# Patient Record
Sex: FEMALE | Race: BLACK OR AFRICAN AMERICAN | Employment: UNEMPLOYED | URBAN - METROPOLITAN AREA
[De-identification: names, ages, dates, MRNs, and addresses within clinical notes are randomized per-mention and may not be internally consistent; named-entity substitution may affect disease eponyms.]

---

## 2017-02-21 ENCOUNTER — OFFICE VISIT (OUTPATIENT)
Dept: FAMILY MEDICINE CLINIC | Age: 52
End: 2017-02-21

## 2017-02-21 VITALS
WEIGHT: 154 LBS | HEIGHT: 68 IN | DIASTOLIC BLOOD PRESSURE: 76 MMHG | SYSTOLIC BLOOD PRESSURE: 114 MMHG | RESPIRATION RATE: 18 BRPM | HEART RATE: 63 BPM | BODY MASS INDEX: 23.34 KG/M2 | TEMPERATURE: 98.5 F | OXYGEN SATURATION: 98 %

## 2017-02-21 DIAGNOSIS — N64.4 BREAST PAIN: Primary | ICD-10-CM

## 2017-02-21 PROCEDURE — 99213 OFFICE O/P EST LOW 20 MIN: CPT | Performed by: NURSE PRACTITIONER

## 2017-02-22 ENCOUNTER — HOSPITAL ENCOUNTER (OUTPATIENT)
Dept: MAMMOGRAPHY | Age: 52
Discharge: HOME | End: 2017-02-22
Attending: NURSE PRACTITIONER | Admitting: NURSE PRACTITIONER

## 2017-02-22 DIAGNOSIS — N64.4 BREAST PAIN: ICD-10-CM

## 2017-02-22 DIAGNOSIS — N63.0 MASS OF BREAST: ICD-10-CM

## 2017-02-22 DIAGNOSIS — N64.4 MASTODYNIA: ICD-10-CM

## 2017-02-27 DIAGNOSIS — D50.9 IRON DEFICIENCY ANEMIA, UNSPECIFIED IRON DEFICIENCY ANEMIA TYPE: ICD-10-CM

## 2017-02-27 RX ORDER — FERROUS SULFATE 325(65) MG
TABLET ORAL
Qty: 60 TABLET | Refills: 0 | Status: SHIPPED | OUTPATIENT
Start: 2017-02-27 | End: 2017-03-31 | Stop reason: SDUPTHER

## 2017-03-31 DIAGNOSIS — D50.9 IRON DEFICIENCY ANEMIA, UNSPECIFIED IRON DEFICIENCY ANEMIA TYPE: ICD-10-CM

## 2017-03-31 RX ORDER — FERROUS SULFATE 325(65) MG
TABLET ORAL
Qty: 60 TABLET | Refills: 5 | Status: SHIPPED | OUTPATIENT
Start: 2017-03-31 | End: 2019-08-23

## 2017-06-14 ENCOUNTER — TELEPHONE (OUTPATIENT)
Dept: SURGERY | Age: 52
End: 2017-06-14

## 2017-06-15 RX ORDER — PREDNISONE 50 MG/1
TABLET ORAL
Qty: 3 TABLET | Refills: 0 | Status: SHIPPED | OUTPATIENT
Start: 2017-06-15 | End: 2017-08-28 | Stop reason: ALTCHOICE

## 2017-06-29 ENCOUNTER — TELEPHONE (OUTPATIENT)
Dept: FAMILY MEDICINE CLINIC | Age: 52
End: 2017-06-29

## 2017-07-28 RX ORDER — ACYCLOVIR 400 MG/1
TABLET ORAL
Qty: 30 TABLET | Refills: 0 | Status: SHIPPED | OUTPATIENT
Start: 2017-07-28 | End: 2017-10-17 | Stop reason: ALTCHOICE

## 2017-08-22 RX ORDER — FENOFIBRATE 160 MG/1
TABLET ORAL
Qty: 90 TABLET | Refills: 1 | Status: SHIPPED | OUTPATIENT
Start: 2017-08-22 | End: 2017-09-01 | Stop reason: ALTCHOICE

## 2017-08-28 ENCOUNTER — OFFICE VISIT (OUTPATIENT)
Dept: FAMILY MEDICINE CLINIC | Age: 52
End: 2017-08-28

## 2017-08-28 VITALS
OXYGEN SATURATION: 99 % | WEIGHT: 151.8 LBS | HEIGHT: 68 IN | BODY MASS INDEX: 23.01 KG/M2 | DIASTOLIC BLOOD PRESSURE: 68 MMHG | SYSTOLIC BLOOD PRESSURE: 108 MMHG | TEMPERATURE: 97.8 F | HEART RATE: 79 BPM | RESPIRATION RATE: 18 BRPM

## 2017-08-28 DIAGNOSIS — J45.20 MILD INTERMITTENT ASTHMA WITHOUT COMPLICATION: ICD-10-CM

## 2017-08-28 DIAGNOSIS — Z20.2 EXPOSURE TO STD: ICD-10-CM

## 2017-08-28 DIAGNOSIS — Z23 NEED FOR PROPHYLACTIC VACCINATION AGAINST STREPTOCOCCUS PNEUMONIAE (PNEUMOCOCCUS): ICD-10-CM

## 2017-08-28 DIAGNOSIS — Z01.419 WELL WOMAN EXAM WITH ROUTINE GYNECOLOGICAL EXAM: Primary | ICD-10-CM

## 2017-08-28 DIAGNOSIS — Z13.1 DIABETES MELLITUS SCREENING: ICD-10-CM

## 2017-08-28 DIAGNOSIS — R23.2 HOT FLASHES: ICD-10-CM

## 2017-08-28 DIAGNOSIS — Z12.11 SCREENING FOR COLON CANCER: ICD-10-CM

## 2017-08-28 DIAGNOSIS — N64.4 BREAST PAIN, LEFT: ICD-10-CM

## 2017-08-28 DIAGNOSIS — Z87.42 HISTORY OF VAGINAL SORES: ICD-10-CM

## 2017-08-28 DIAGNOSIS — Z13.220 LIPID SCREENING: ICD-10-CM

## 2017-08-28 DIAGNOSIS — Z11.4 SCREENING FOR HIV WITHOUT PRESENCE OF RISK FACTORS: ICD-10-CM

## 2017-08-28 LAB
A/G RATIO: 1.6 (ref 1.1–2.2)
ALBUMIN SERPL-MCNC: 5.1 G/DL (ref 3.4–5)
ALP BLD-CCNC: 35 U/L (ref 40–129)
ALT SERPL-CCNC: 10 U/L (ref 10–40)
ANION GAP SERPL CALCULATED.3IONS-SCNC: 17 MMOL/L (ref 3–16)
AST SERPL-CCNC: 17 U/L (ref 15–37)
BILIRUB SERPL-MCNC: <0.2 MG/DL (ref 0–1)
BUN BLDV-MCNC: 16 MG/DL (ref 7–20)
CALCIUM SERPL-MCNC: 10.3 MG/DL (ref 8.3–10.6)
CHLORIDE BLD-SCNC: 102 MMOL/L (ref 99–110)
CHOLESTEROL, TOTAL: 257 MG/DL (ref 0–199)
CO2: 23 MMOL/L (ref 21–32)
CREAT SERPL-MCNC: 0.9 MG/DL (ref 0.6–1.1)
GFR AFRICAN AMERICAN: >60
GFR NON-AFRICAN AMERICAN: >60
GLOBULIN: 3.1 G/DL
GLUCOSE BLD-MCNC: 89 MG/DL (ref 70–99)
HDLC SERPL-MCNC: 50 MG/DL (ref 40–60)
LDL CHOLESTEROL CALCULATED: 178 MG/DL
POTASSIUM SERPL-SCNC: 4.1 MMOL/L (ref 3.5–5.1)
SODIUM BLD-SCNC: 142 MMOL/L (ref 136–145)
TOTAL PROTEIN: 8.2 G/DL (ref 6.4–8.2)
TRIGL SERPL-MCNC: 143 MG/DL (ref 0–150)
VLDLC SERPL CALC-MCNC: 29 MG/DL

## 2017-08-28 PROCEDURE — 90471 IMMUNIZATION ADMIN: CPT | Performed by: NURSE PRACTITIONER

## 2017-08-28 PROCEDURE — 90670 PCV13 VACCINE IM: CPT | Performed by: NURSE PRACTITIONER

## 2017-08-28 PROCEDURE — 99396 PREV VISIT EST AGE 40-64: CPT | Performed by: NURSE PRACTITIONER

## 2017-08-28 PROCEDURE — 36415 COLL VENOUS BLD VENIPUNCTURE: CPT | Performed by: NURSE PRACTITIONER

## 2017-08-28 RX ORDER — VENLAFAXINE HYDROCHLORIDE 37.5 MG/1
37.5 CAPSULE, EXTENDED RELEASE ORAL DAILY
Qty: 30 CAPSULE | Refills: 1 | Status: SHIPPED | OUTPATIENT
Start: 2017-08-28 | End: 2017-10-25 | Stop reason: SDUPTHER

## 2017-08-28 ASSESSMENT — PATIENT HEALTH QUESTIONNAIRE - PHQ9
1. LITTLE INTEREST OR PLEASURE IN DOING THINGS: 0
SUM OF ALL RESPONSES TO PHQ9 QUESTIONS 1 & 2: 0
2. FEELING DOWN, DEPRESSED OR HOPELESS: 0
SUM OF ALL RESPONSES TO PHQ QUESTIONS 1-9: 0

## 2017-08-29 LAB
C TRACH DNA GENITAL QL NAA+PROBE: NEGATIVE
CANDIDA SPECIES, DNA PROBE: NORMAL
GARDNERELLA VAGINALIS, DNA PROBE: NORMAL
HIV-1 AND HIV-2 ANTIBODIES: NORMAL
N. GONORRHOEAE DNA: NEGATIVE
TRICHOMONAS VAGINALIS DNA: NORMAL

## 2017-08-30 LAB
HERPES TYPE 1/2 IGM COMBINED: 0.59 IV
HERPES TYPE I/II IGG COMBINED: >22.4 IV
HPV COMMENT: NORMAL
HPV TYPE 16: NOT DETECTED
HPV TYPE 18: NOT DETECTED
HPVOH (OTHER TYPES): NOT DETECTED
HSV 2 GLYCOPROTEIN G AB IGG: >23.6 IV

## 2017-09-01 DIAGNOSIS — E78.00 HYPERCHOLESTEROLEMIA: Primary | ICD-10-CM

## 2017-09-01 RX ORDER — ATORVASTATIN CALCIUM 10 MG/1
10 TABLET, FILM COATED ORAL DAILY
Qty: 30 TABLET | Refills: 2 | Status: SHIPPED | OUTPATIENT
Start: 2017-09-01 | End: 2017-11-17 | Stop reason: SDUPTHER

## 2017-09-18 ENCOUNTER — HOSPITAL ENCOUNTER (OUTPATIENT)
Dept: MAMMOGRAPHY | Age: 52
Discharge: HOME | End: 2017-09-18
Attending: NURSE PRACTITIONER | Admitting: NURSE PRACTITIONER

## 2017-09-18 DIAGNOSIS — N64.4 BREAST PAIN, LEFT: ICD-10-CM

## 2017-09-29 RX ORDER — VALACYCLOVIR HYDROCHLORIDE 1 G/1
1000 TABLET, FILM COATED ORAL DAILY
Qty: 30 TABLET | Refills: 11 | Status: SHIPPED | OUTPATIENT
Start: 2017-09-29 | End: 2017-10-29

## 2017-09-29 RX ORDER — ACYCLOVIR 400 MG/1
TABLET ORAL
Qty: 30 TABLET | Refills: 0 | OUTPATIENT
Start: 2017-09-29

## 2017-10-17 ENCOUNTER — OFFICE VISIT (OUTPATIENT)
Dept: FAMILY MEDICINE CLINIC | Age: 52
End: 2017-10-17

## 2017-10-17 VITALS
BODY MASS INDEX: 23.89 KG/M2 | HEIGHT: 68 IN | HEART RATE: 62 BPM | WEIGHT: 157.6 LBS | DIASTOLIC BLOOD PRESSURE: 72 MMHG | TEMPERATURE: 98.4 F | RESPIRATION RATE: 16 BRPM | SYSTOLIC BLOOD PRESSURE: 110 MMHG

## 2017-10-17 DIAGNOSIS — B97.89 VIRAL SINUSITIS: Primary | ICD-10-CM

## 2017-10-17 DIAGNOSIS — Z23 NEEDS FLU SHOT: ICD-10-CM

## 2017-10-17 DIAGNOSIS — K42.9 UMBILICAL HERNIA WITHOUT OBSTRUCTION AND WITHOUT GANGRENE: ICD-10-CM

## 2017-10-17 DIAGNOSIS — J32.9 VIRAL SINUSITIS: Primary | ICD-10-CM

## 2017-10-17 PROCEDURE — 90630 INFLUENZA, QUADV, 18-64 YRS, ID, PF, MICRO INJ, 0.1ML (FLUZONE QUADV, PF): CPT | Performed by: REGISTERED NURSE

## 2017-10-17 PROCEDURE — 90471 IMMUNIZATION ADMIN: CPT | Performed by: REGISTERED NURSE

## 2017-10-17 PROCEDURE — 99213 OFFICE O/P EST LOW 20 MIN: CPT | Performed by: REGISTERED NURSE

## 2017-10-17 RX ORDER — METHYLPREDNISOLONE 4 MG/1
4 TABLET ORAL SEE ADMIN INSTRUCTIONS
Qty: 1 KIT | Refills: 0 | Status: SHIPPED | OUTPATIENT
Start: 2017-10-17 | End: 2017-10-23

## 2017-10-17 ASSESSMENT — ENCOUNTER SYMPTOMS
CHEST TIGHTNESS: 0
TROUBLE SWALLOWING: 0
BLOOD IN STOOL: 0
SORE THROAT: 0
SINUS PAIN: 1
SHORTNESS OF BREATH: 0
NAUSEA: 0
DIARRHEA: 0
SINUS PRESSURE: 1
CONSTIPATION: 0
VOMITING: 0
RHINORRHEA: 1
WHEEZING: 0
ABDOMINAL PAIN: 1
COUGH: 0
ABDOMINAL DISTENTION: 0

## 2017-10-17 NOTE — PROGRESS NOTES
[facility] Medicine  Clinic Note    Date: 10/17/2017                                               Subjective/Objective:     Chief Complaint   Patient presents with    Sinus Problem     PT CO HEAD CONGESTION, SINUS PAIN/PRESSURE. DRY COUGH. STARTED THIS AM. NO OTC MEDS TRIED.  Hernia     HERNIA SURGERY LAST NOVEMBER, AROUND JULY SHE STATES IT STARTED POKING BACK OUT AND CAUSING MORE SX THAT KEEP GETTING WORSE. BOTHERS HER DAILY. HAS APT FOR CT SCAN ON 10/28/17. HPI Patient presents with head congestion, sinus pain/pressure and dry cough that started today. States she woke up symptoms. Also having runny nose. Has not attempted to treat symptoms. Denies fever, chills, fatigue, SOB, CP, sore throat, ear pain, or GI symptoms. Patient thinks her umbilical hernia is back. Had surgery last November. States her umbilicus is bulging and causing pain again. This is happening daily. Her surgeon is aware. She has a CT scheduled for 10/28/17 and would like it moved up sooner.          Patient Active Problem List    Diagnosis Date Noted    Periumbilical pain 56/27/2298    Asthma 03/18/2015    Vitamin D deficiency 03/17/2014    Hypertriglyceridemia 51/88/1256    Umbilical hernia without obstruction and without gangrene 03/14/2014    Hyperlipidemia 03/14/2014    Anemia 03/14/2014    Fatigue 03/14/2014       Past Medical History:   Diagnosis Date    Anemia     iron defieciency    Asthma     Asthma     Hemorrhagic cyst of ovary 2007    Left    Hyperlipidemia        Past Surgical History:   Procedure Laterality Date    HERNIA REPAIR      OTHER SURGICAL HISTORY      lump removed from under left arm pit     TUBAL LIGATION      UMBILICAL HERNIA REPAIR  12/06/2016    and umbilectomy       Orders Only on 08/28/2017   Component Date Value Ref Range Status    C. trachomatis DNA 08/29/2017 Negative  Negative Final    N. gonorrhoeae DNA 08/29/2017 Negative  Negative Final    HPV TYPE 16 08/30/2017 Not

## 2017-10-17 NOTE — PATIENT INSTRUCTIONS
Instructions. \"     If you do not have an account, please click on the \"Sign Up Now\" link. Current as of: May 4, 2017  Content Version: 11.3  © 2006-2017 [employer]. Care instructions adapted under license by [facility] ([facility]). If you have questions about a medical condition or this instruction, always ask your healthcare professional. [address] any warranty or liability for your use of this information. Patient Education        Sinusitis: Care Instructions  Your Care Instructions    Sinusitis is an infection of the lining of the sinus cavities in your head. Sinusitis often follows a cold. It causes pain and pressure in your head and face. In most cases, sinusitis gets better on its own in 1 to 2 weeks. But some mild symptoms may last for several weeks. Sometimes antibiotics are needed. Follow-up care is a key part of your treatment and safety. Be sure to make and go to all appointments, and call your doctor if you are having problems. It's also a good idea to know your test results and keep a list of the medicines you take. How can you care for yourself at home? · Take an over-the-counter pain medicine, such as acetaminophen (Tylenol), ibuprofen (Advil, Motrin), or naproxen (Aleve). Read and follow all instructions on the label. · If the doctor prescribed antibiotics, take them as directed. Do not stop taking them just because you feel better. You need to take the full course of antibiotics. · Be careful when taking over-the-counter cold or flu medicines and Tylenol at the same time. Many of these medicines have acetaminophen, which is Tylenol. Read the labels to make sure that you are not taking more than the recommended dose. Too much acetaminophen (Tylenol) can be harmful. · Breathe warm, moist air from a steamy shower, a hot bath, or a sink filled with hot water. Avoid cold, dry air. Using a humidifier in your home may help.  Follow the directions for cleaning the have a new or higher fever. · You have blood in your stools. · You have new belly pain, or your pain gets worse. · You have a new rash. Watch closely for changes in your health, and be sure to contact your doctor if:  · You start to get better and then get worse. · You do not get better as expected. Where can you learn more? Go to https://[employer])pepiceweb.Pinnatta. org and sign in to your AudioSnaps account. Enter W698 in the WatchFrog box to learn more about \"Viral Infections: Care Instructions. \"     If you do not have an account, please click on the \"Sign Up Now\" link. Current as of: March 3, 2017  Content Version: 11.3  © 4181-7520 [employer]. Care instructions adapted under license by [facility] ([facility]). If you have questions about a medical condition or this instruction, always ask your healthcare professional. [address] any warranty or liability for your use of this information.

## 2017-10-23 ENCOUNTER — HOSPITAL ENCOUNTER (OUTPATIENT)
Dept: CT IMAGING | Age: 52
Discharge: HOME | End: 2017-10-23
Attending: SURGERY | Admitting: SURGERY

## 2017-10-23 DIAGNOSIS — K86.2 CYST OF PANCREAS: ICD-10-CM

## 2017-10-23 DIAGNOSIS — K86.2 PANCREATIC CYST: ICD-10-CM

## 2017-10-24 ENCOUNTER — TELEPHONE (OUTPATIENT)
Dept: FAMILY MEDICINE CLINIC | Age: 52
End: 2017-10-24

## 2017-10-24 DIAGNOSIS — Z12.11 COLON CANCER SCREENING: Primary | ICD-10-CM

## 2017-10-24 NOTE — TELEPHONE ENCOUNTER
Patient is calling she would like a order to have done @ [facility] for a colonoscopy.  Please give her a call back and does she need a referral.

## 2017-10-25 ENCOUNTER — OFFICE VISIT (OUTPATIENT)
Dept: SURGERY | Age: 52
End: 2017-10-25

## 2017-10-25 ENCOUNTER — OFFICE VISIT (OUTPATIENT)
Dept: FAMILY MEDICINE CLINIC | Age: 52
End: 2017-10-25

## 2017-10-25 ENCOUNTER — TELEPHONE (OUTPATIENT)
Dept: FAMILY MEDICINE CLINIC | Age: 52
End: 2017-10-25

## 2017-10-25 VITALS
SYSTOLIC BLOOD PRESSURE: 104 MMHG | RESPIRATION RATE: 18 BRPM | HEIGHT: 68 IN | WEIGHT: 158 LBS | BODY MASS INDEX: 23.95 KG/M2 | HEART RATE: 69 BPM | DIASTOLIC BLOOD PRESSURE: 60 MMHG | OXYGEN SATURATION: 99 %

## 2017-10-25 VITALS — SYSTOLIC BLOOD PRESSURE: 112 MMHG | WEIGHT: 157 LBS | DIASTOLIC BLOOD PRESSURE: 62 MMHG | BODY MASS INDEX: 23.87 KG/M2

## 2017-10-25 DIAGNOSIS — K43.9 EPIGASTRIC HERNIA: Primary | ICD-10-CM

## 2017-10-25 DIAGNOSIS — M54.41 ACUTE RIGHT-SIDED LOW BACK PAIN WITH RIGHT-SIDED SCIATICA: Primary | ICD-10-CM

## 2017-10-25 DIAGNOSIS — R23.2 HOT FLASHES: ICD-10-CM

## 2017-10-25 PROCEDURE — 99213 OFFICE O/P EST LOW 20 MIN: CPT | Performed by: SURGERY

## 2017-10-25 PROCEDURE — 99213 OFFICE O/P EST LOW 20 MIN: CPT | Performed by: NURSE PRACTITIONER

## 2017-10-25 RX ORDER — CYCLOBENZAPRINE HCL 5 MG
5 TABLET ORAL 3 TIMES DAILY PRN
Qty: 30 TABLET | Refills: 0 | Status: SHIPPED | OUTPATIENT
Start: 2017-10-25 | End: 2017-11-04

## 2017-10-25 RX ORDER — PREDNISONE 10 MG/1
TABLET ORAL
Qty: 20 TABLET | Refills: 0 | Status: SHIPPED | OUTPATIENT
Start: 2017-10-25 | End: 2017-11-15 | Stop reason: ALTCHOICE

## 2017-10-25 NOTE — PROGRESS NOTES
Subjective:      Patient ID: Ellie Gilmore is a 50 y.o. female. Chief complaint supraumbilical abdominal pain    HPI- 74-year-old female who presents with complaints of supraumbilical abdominal pain. She occasionally has associated nausea. Review of Systems    Objective:   Physical Exam   Constitutional: She is oriented to person, place, and time. She appears well-developed and well-nourished. HENT:   Head: Normocephalic and atraumatic. Right Ear: External ear normal.   Left Ear: External ear normal.   Eyes: Conjunctivae and EOM are normal.   Neck: Normal range of motion. Neck supple. Cardiovascular: Normal rate and regular rhythm. Pulmonary/Chest: Effort normal and breath sounds normal.   Abdominal: Soft. Epigastric hernia containing fat   Musculoskeletal: Normal range of motion. She exhibits no edema. Neurological: She is alert and oriented to person, place, and time. Skin: Skin is warm and dry. Psychiatric: She has a normal mood and affect. Her behavior is normal.       Assessment:      74-year-old female who is here for follow-up of supraumbilical abdominal pain and for cystic lesion which was seen in the pancreas on a previous CT. Repeat CAT scan shows that the cyst is no longer present. There is no further need for follow-up of the pancreas at this point in time. She does have a symptomatic epigastric hernia located just above the level of the umbilicus. Plan:      Epigastric hernia repair with possible mesh. Patient explained risks, benefits and complications of hernia repair including hernia recurrence, infection requiring mesh removal, bowel injury or erosion of mesh into bowel and nerve entrapment.

## 2017-10-25 NOTE — COMMUNICATION BODY
Assessment:     57-year-old female who is here for follow-up of supraumbilical abdominal pain and for cystic lesion which was seen in the pancreas on a previous CT. Repeat CAT scan shows that the cyst is no longer present. There is no further need for follow-up of the pancreas at this point in time. She does have a symptomatic epigastric hernia located just above the level of the umbilicus. Plan:     Epigastric hernia repair with possible mesh. Patient explained risks, benefits and complications of hernia repair including hernia recurrence, infection requiring mesh removal, bowel injury or erosion of mesh into bowel and nerve entrapment.

## 2017-10-25 NOTE — TELEPHONE ENCOUNTER
Patient had CT scan of her pelvic & abdomin, she is now having right muscle leg weakness - started on yesterday early afternoon. Does she need to come in or what does she need to do.

## 2017-10-25 NOTE — LETTER
Surgery Scheduling Form  PATIENT DEMOGRAPHICS:  Patient Name:  Tong Cano  Patient :  1965   Patient SS#:      Patient Phone:  [phone] (home)  Alt. Patient Phone:    Patient Address:  [address] [place] [address]  PCP:  Nickolas Mercado CNP   Insurance:  Payor: Kenneth Mooney 150 / Plan: BCBS - [place] PPO / Product Type: *No Product type* /   Insurance ID Number:    Payor/Plan Subscr  Sex Relation Sub. Ins. ID Effective Group Num   1.  [id number] Simone Disla 1966 Male  [id number] 1/1/15 [id number]                                    [id number]     Allergies: Contrast [gadolinium derivatives]  DIAGNOSIS & PROCEDURE:  Diagnosis:   Epigastric hernia  Operation:  Open epigastric hernia repair with possible mesh   Location:  [facility]  Surgeon:  [facility] INFORMATION:   Surgeon's Scheduling Instruction:  elective  Requested Date: 10/30/2017   OR Time: 900  Patient Arrival Time: 730  OR Time Required:  60  Minutes  Anesthesia:  General  Equipment:  none   Status:  Outpatient  PAT Required: Per Anesthesia  Pre-op H & P:  By Surgeon  Special Comments:  Date:  10/25/17  Time: 215 pm  Confirmation Number________________________  PRE-CERTIFICATION INFORMATION:  ICD 10 Code: K43.9        CPT Code: 22500  Modifier

## 2017-10-25 NOTE — PROGRESS NOTES
Subjective:      Patient ID: Maggie Adames is a 50 y.o. female. HPI     Right leg pain x one day no injury -aching pain worse with activity - numbness from right hip to below knee and tingling in groin- not tender to touch- feels weak favoring left side had a hard time sleeping - she has not tried anything    Review of Systems   Musculoskeletal:        Right leg pain   All other systems reviewed and are negative. Objective:   Physical Exam   Constitutional: Vital signs are normal. She appears well-developed and well-nourished. She is active. Musculoskeletal:        Lumbar back: She exhibits normal range of motion and no tenderness. Positive SLR test noted to right leg with extension  Slight weakness noted in right leg 4/5  No DROM or pain noted with internal - external hip rotation   Neurological:   Reflex Scores:       Patellar reflexes are 2+ on the right side and 2+ on the left side. Psychiatric: She has a normal mood and affect. Her speech is normal and behavior is normal. Judgment and thought content normal. Cognition and memory are normal.       Assessment:      1. Acute right-sided low back pain with right-sided sciatica           Plan:      Laura Coles was seen today for leg pain. Diagnoses and all orders for this visit:    Acute right-sided low back pain with right-sided sciatica  -     predniSONE (DELTASONE) 10 MG tablet; 4 tabs x 2 d 3 tabs x 2 d 2 tabs x 2 d 1  tab x 2 d in am with food avoid NSAIDs while on this medication  -     cyclobenzaprine (FLEXERIL) 5 MG tablet;  Take 1 tablet by mouth 3 times daily as needed for Muscle spasms- sedation precautions dw pt  Pt encouraged to do stretches at least 4 x daily  Cold compresses at least four x daily on 15 min off 15 min  I informed pt that this may take several weeks to resolve

## 2017-10-25 NOTE — PATIENT INSTRUCTIONS
Patient Education        Sciatica: Exercises  Your Care Instructions  Here are some examples of typical rehabilitation exercises for your condition. Start each exercise slowly. Ease off the exercise if you start to have pain. Your doctor or physical therapist will tell you when you can start these exercises and which ones will work best for you. When you are not being active, find a comfortable position for rest. Some people are comfortable on the floor or a medium-firm bed with a small pillow under their head and another under their knees. Some people prefer to lie on their side with a pillow between their knees. Don't stay in one position for too long. Take short walks (10 to 20 minutes) every 2 to 3 hours. Avoid slopes, hills, and stairs until you feel better. Walk only distances you can manage without pain, especially leg pain. How to do the exercises  Back stretches    1. Get down on your hands and knees on the floor. 2. Relax your head and allow it to droop. Round your back up toward the ceiling until you feel a nice stretch in your upper, middle, and lower back. Hold this stretch for as long as it feels comfortable, or about 15 to 30 seconds. 3. Return to the starting position with a flat back while you are on your hands and knees. 4. Let your back sway by pressing your stomach toward the floor. Lift your buttocks toward the ceiling. 5. Hold this position for 15 to 30 seconds. 6. Repeat 2 to 4 times. Follow-up care is a key part of your treatment and safety. Be sure to make and go to all appointments, and call your doctor if you are having problems. It's also a good idea to know your test results and keep a list of the medicines you take. Where can you learn more? Go to https://Nema Labsrain.TrueFacet. org and sign in to your CloudPrime account. Enter U585 in the [employer] box to learn more about \"Sciatica: Exercises. \"     If you do not have an account, please click on the \"Sign Up Now\" link. Current as of: March 21, 2017  Content Version: 11.3  © 9362-0652 [employer]. Care instructions adapted under license by [facility] ([facility]). If you have questions about a medical condition or this instruction, always ask your healthcare professional. [address] any warranty or liability for your use of this information. Patient Education        Sciatica: Care Instructions  Your Care Instructions    Sciatica (say \"gqs-PF-vf-kuh\") is an irritation of one of the sciatic nerves, which come from the spinal cord in the lower back. The sciatic nerves and their branches extend down through the buttock to the foot. Sciatica can develop when an injured disc in the back presses against a spinal nerve root. Its main symptom is pain, numbness, or weakness that is often worse in the leg or foot than in the back. Sciatica often will improve and go away with time. Early treatment usually includes medicines and exercises to relieve pain. Follow-up care is a key part of your treatment and safety. Be sure to make and go to all appointments, and call your doctor if you are having problems. It's also a good idea to know your test results and keep a list of the medicines you take. How can you care for yourself at home? · Take pain medicines exactly as directed. ¨ If the doctor gave you a prescription medicine for pain, take it as prescribed. ¨ If you are not taking a prescription pain medicine, ask your doctor if you can take an over-the-counter medicine. · Use heat or ice to relieve pain. ¨ To apply heat, put a warm water bottle, heating pad set on low, or warm cloth on your back. Do not go to sleep with a heating pad on your skin. ¨ To use ice, put ice or a cold pack on the area for 10 to 20 minutes at a time. Put a thin cloth between the ice and your skin. · Avoid sitting if possible, unless it feels better than standing. · Alternate lying down with short walks.  Increase your walking distance as you are able to without making your symptoms worse. · Do not do anything that makes your symptoms worse. When should you call for help? Call 911 anytime you think you may need emergency care. For example, call if:  · You are unable to move a leg at all. Call your doctor now or seek immediate medical care if:  · You have new or worse symptoms in your legs or buttocks. Symptoms may include:  ¨ Numbness or tingling. ¨ Weakness. ¨ Pain. · You lose bladder or bowel control. Watch closely for changes in your health, and be sure to contact your doctor if:  · You are not getting better as expected. Where can you learn more? Go to https://Vetiary.[employer] Dairy Fab. org and sign in to your [facility] account. Enter [phone] in the NextGxDX box to learn more about \"Sciatica: Care Instructions. \"     If you do not have an account, please click on the \"Sign Up Now\" link. Current as of: March 21, 2017  Content Version: 11.3  © 0215-9374 [employer]. Care instructions adapted under license by [facility] [address] If you have questions about a medical condition or this instruction, always ask your healthcare professional. [place] [address] any warranty or liability for your use of this information.

## 2017-10-27 RX ORDER — VENLAFAXINE HYDROCHLORIDE 37.5 MG/1
37.5 CAPSULE, EXTENDED RELEASE ORAL DAILY
Qty: 30 CAPSULE | Refills: 0 | Status: SHIPPED | OUTPATIENT
Start: 2017-10-27 | End: 2018-09-10 | Stop reason: ALTCHOICE

## 2017-10-30 ENCOUNTER — HOSPITAL ENCOUNTER (OUTPATIENT)
Dept: SURGERY | Age: 52
Discharge: HOME | End: 2017-10-30
Attending: SURGERY | Admitting: SURGERY

## 2017-10-30 VITALS
WEIGHT: 156 LBS | HEIGHT: 64 IN | RESPIRATION RATE: 16 BRPM | TEMPERATURE: 97.1 F | DIASTOLIC BLOOD PRESSURE: 74 MMHG | OXYGEN SATURATION: 100 % | HEART RATE: 52 BPM | SYSTOLIC BLOOD PRESSURE: 141 MMHG | BODY MASS INDEX: 26.63 KG/M2

## 2017-10-30 LAB
HCT VFR BLD CALC: 34.1 % (ref 36–48)
HEMOGLOBIN: 11.3 G/DL (ref 12–16)
MCH RBC QN AUTO: 25.4 PG (ref 26–34)
MCHC RBC AUTO-ENTMCNC: 33.3 G/DL (ref 31–36)
MCV RBC AUTO: 76.3 FL (ref 80–100)
PDW BLD-RTO: 15.7 % (ref 12.4–15.4)
PLATELET # BLD: 341 K/UL (ref 135–450)
PMV BLD AUTO: 7.1 FL (ref 5–10.5)
RBC # BLD: 4.46 M/UL (ref 4–5.2)
WBC # BLD: 11.8 K/UL (ref 4–11)

## 2017-10-30 PROCEDURE — 49572 PR REPAIR EPIGASTRIC HERNIA,STRANG: CPT | Performed by: SURGERY

## 2017-10-30 RX ORDER — CEFAZOLIN SODIUM 2 G/100ML
INJECTION, SOLUTION INTRAVENOUS
Status: COMPLETED
Start: 2017-10-30 | End: 2017-10-30

## 2017-10-30 RX ORDER — SODIUM CHLORIDE 0.9 % (FLUSH) 0.9 %
10 SYRINGE (ML) INJECTION EVERY 12 HOURS SCHEDULED
Status: DISCONTINUED | OUTPATIENT
Start: 2017-10-30 | End: 2017-10-31 | Stop reason: HOSPADM

## 2017-10-30 RX ORDER — HYDRALAZINE HYDROCHLORIDE 20 MG/ML
5 INJECTION INTRAMUSCULAR; INTRAVENOUS EVERY 10 MIN PRN
Status: DISCONTINUED | OUTPATIENT
Start: 2017-10-30 | End: 2017-10-31 | Stop reason: HOSPADM

## 2017-10-30 RX ORDER — PROMETHAZINE HYDROCHLORIDE 25 MG/ML
6.25 INJECTION, SOLUTION INTRAMUSCULAR; INTRAVENOUS EVERY 10 MIN PRN
Status: DISCONTINUED | OUTPATIENT
Start: 2017-10-30 | End: 2017-10-31 | Stop reason: HOSPADM

## 2017-10-30 RX ORDER — LABETALOL HYDROCHLORIDE 5 MG/ML
5 INJECTION, SOLUTION INTRAVENOUS EVERY 10 MIN PRN
Status: DISCONTINUED | OUTPATIENT
Start: 2017-10-30 | End: 2017-10-31 | Stop reason: HOSPADM

## 2017-10-30 RX ORDER — SODIUM CHLORIDE, SODIUM LACTATE, POTASSIUM CHLORIDE, CALCIUM CHLORIDE 600; 310; 30; 20 MG/100ML; MG/100ML; MG/100ML; MG/100ML
INJECTION, SOLUTION INTRAVENOUS CONTINUOUS
Status: DISCONTINUED | OUTPATIENT
Start: 2017-10-30 | End: 2017-10-31 | Stop reason: HOSPADM

## 2017-10-30 RX ORDER — MEPERIDINE HYDROCHLORIDE 25 MG/ML
12.5 INJECTION INTRAMUSCULAR; INTRAVENOUS; SUBCUTANEOUS EVERY 5 MIN PRN
Status: DISCONTINUED | OUTPATIENT
Start: 2017-10-30 | End: 2017-10-31 | Stop reason: HOSPADM

## 2017-10-30 RX ORDER — HYDROMORPHONE HCL 110MG/55ML
0.5 PATIENT CONTROLLED ANALGESIA SYRINGE INTRAVENOUS EVERY 5 MIN PRN
Status: DISCONTINUED | OUTPATIENT
Start: 2017-10-30 | End: 2017-10-31 | Stop reason: HOSPADM

## 2017-10-30 RX ORDER — ONDANSETRON 2 MG/ML
4 INJECTION INTRAMUSCULAR; INTRAVENOUS
Status: ACTIVE | OUTPATIENT
Start: 2017-10-30 | End: 2017-10-30

## 2017-10-30 RX ORDER — LIDOCAINE HYDROCHLORIDE 10 MG/ML
1 INJECTION, SOLUTION EPIDURAL; INFILTRATION; INTRACAUDAL; PERINEURAL
Status: ACTIVE | OUTPATIENT
Start: 2017-10-30 | End: 2017-10-30

## 2017-10-30 RX ORDER — SODIUM CHLORIDE, SODIUM LACTATE, POTASSIUM CHLORIDE, CALCIUM CHLORIDE 600; 310; 30; 20 MG/100ML; MG/100ML; MG/100ML; MG/100ML
INJECTION, SOLUTION INTRAVENOUS
Status: DISPENSED
Start: 2017-10-30 | End: 2017-10-30

## 2017-10-30 RX ORDER — HYDROCODONE BITARTRATE AND ACETAMINOPHEN 5; 325 MG/1; MG/1
1-2 TABLET ORAL EVERY 4 HOURS PRN
Qty: 30 TABLET | Refills: 0 | Status: SHIPPED | OUTPATIENT
Start: 2017-10-30 | End: 2017-11-06

## 2017-10-30 RX ORDER — CEFAZOLIN SODIUM 2 G/100ML
2 INJECTION, SOLUTION INTRAVENOUS ONCE
Status: COMPLETED | OUTPATIENT
Start: 2017-10-30 | End: 2017-10-30

## 2017-10-30 RX ORDER — OXYCODONE HYDROCHLORIDE AND ACETAMINOPHEN 5; 325 MG/1; MG/1
1 TABLET ORAL
Status: ACTIVE | OUTPATIENT
Start: 2017-10-30 | End: 2017-10-30

## 2017-10-30 RX ORDER — SODIUM CHLORIDE 0.9 % (FLUSH) 0.9 %
10 SYRINGE (ML) INJECTION PRN
Status: DISCONTINUED | OUTPATIENT
Start: 2017-10-30 | End: 2017-10-31 | Stop reason: HOSPADM

## 2017-10-30 RX ADMIN — CEFAZOLIN SODIUM 2 G: 2 INJECTION, SOLUTION INTRAVENOUS at 09:08

## 2017-10-30 RX ADMIN — PROMETHAZINE HYDROCHLORIDE 6.25 MG: 25 INJECTION, SOLUTION INTRAMUSCULAR; INTRAVENOUS at 10:41

## 2017-10-30 RX ADMIN — Medication 0.5 MG: at 10:55

## 2017-10-30 RX ADMIN — Medication 0.5 MG: at 11:00

## 2017-10-30 RX ADMIN — Medication 0.5 MG: at 10:45

## 2017-10-30 ASSESSMENT — PAIN SCALES - GENERAL
PAINLEVEL_OUTOF10: 7
PAINLEVEL_OUTOF10: 8

## 2017-10-30 ASSESSMENT — PAIN DESCRIPTION - PAIN TYPE: TYPE: SURGICAL PAIN

## 2017-10-30 ASSESSMENT — PAIN - FUNCTIONAL ASSESSMENT: PAIN_FUNCTIONAL_ASSESSMENT: 0-10

## 2017-10-30 NOTE — ANESTHESIA POST-OP
Anesthesia Post-op Note    Patient: Ruben Cowden  MRN: [id number]  YOB: 1965  Date of evaluation: 10/30/2017  Time:  11:15 AM     Procedure(s) Performed:     Last Vitals: BP (!) 149/64   Pulse 52   Temp 97 °F (36.1 °C) (Temporal)   Resp 10   Ht 5' 4\" (1.626 m)   Wt 156 lb (70.8 kg)   LMP 02/07/2010   SpO2 93%   BMI 26.78 kg/m²     Sridhar Phase I: Sridhar Score: 10    Sridhar Phase II:      Anesthesia Post Evaluation    Final anesthesia type: general  Patient location during evaluation: PACU  Patient participation: complete - patient participated  Level of consciousness: awake and alert  Airway patency: patent  Nausea & Vomiting: no nausea and no vomiting  Complications: no  Cardiovascular status: hemodynamically stable  Respiratory status: acceptable  Hydration status: stable        Jennifer Villagran MD  11:15 AM

## 2017-10-30 NOTE — ANESTHESIA PRE-OP
oxyCODONE-acetaminophen (PERCOCET) 5-325 MG per tablet 1 tablet  1 tablet Oral Once PRN Kacie Mackay MD        ondansetron [facility]) injection 4 mg  4 mg Intravenous Once PRN Kacie Mackay MD        labetalol (NORMODYNE;TRANDATE) injection 5 mg  5 mg Intravenous Q10 Min PRN Kacie Mackay MD        hydrALAZINE (APRESOLINE) injection 5 mg  5 mg Intravenous Q10 Min PRN Kacie Mackay MD        meperidine (DEMEROL) injection 12.5 mg  12.5 mg Intravenous Q5 Min PRN Kacie Mackay MD        promethazine [facility]) injection 6.25 mg  6.25 mg Intravenous Q10 Min PRN Kacie Mackay MD           Allergies: Allergies   Allergen Reactions    Contrast [Gadolinium Derivatives] Rash     Rash all over body, low grade fever, body aches. Went to ER next day for treatment. Problem List:    Patient Active Problem List   Diagnosis Code    Umbilical hernia without obstruction and without gangrene K42.9    Hyperlipidemia E78.5    Anemia D64.9    Fatigue R53.83    Vitamin D deficiency E55.9    Hypertriglyceridemia E78.1    Asthma L65.075    Periumbilical pain U98.62       Past Medical History:        Diagnosis Date    Anemia     iron defieciency    Asthma     Asthma     Hemorrhagic cyst of ovary 2007    Left    Hyperlipidemia        Past Surgical History:        Procedure Laterality Date    HERNIA REPAIR      OTHER SURGICAL HISTORY      lump removed from under left arm pit     TUBAL LIGATION      UMBILICAL HERNIA REPAIR  12/06/2016    and umbilectomy       Social History:    Social History   Substance Use Topics    Smoking status: Never Smoker    Smokeless tobacco: Never Used    Alcohol use No                                Counseling given: Not Answered      Vital Signs (Current): There were no vitals filed for this visit.                                            BP Readings from Last 3 Encounters:   10/25/17 104/60   10/25/17 112/62   10/17/17 110/72

## 2017-10-30 NOTE — H&P
[facility]  and Laparoscopic Surgery      I have reviewed the history and physical and examined the patient and find no relevant changes. I have reviewed with the patient and/or family the risks, benefits, and alternatives to the procedure.     Adal Potter MD  10/30/2017

## 2017-10-30 NOTE — PROGRESS NOTES
Patient rating pain still 7/10, but states this is tolerable for her. She is sitting up tolerating grahm crackers. VSS. Abd soft, surgical incision intact. Phase 1 criteria met, will transfer to [facility] when RN available.

## 2017-10-31 NOTE — OP NOTE
sutures. Bites were  taken through the superior fascia, the superior leaflet on the mesh,  followed by the inferior leaflet on the mesh, and the inferior fascial  edge. The mesh did lay flat and had an excellent circumferential  overlap. The sutures were then tied. The fascia was injected with  0.5% Marcaine with epinephrine before the subcutaneous layer was  closed with interrupted 3-0 Vicryl sutures. The skin was closed with  running 4-0 subcuticular suture. Dermabond was then applied. The  patient tolerated the procedure without difficulty and was transferred  to recovery room in stable condition.           Toney MD Norberto    D: 10/30/2017 13:06:57       T: 10/30/2017 13:11:01     [id number]  Job#: [id number]     Doc#: [id number]    CC:  Lennox Old, CNP

## 2017-11-07 ENCOUNTER — OFFICE VISIT (OUTPATIENT)
Dept: ORTHOPEDIC SURGERY | Age: 52
End: 2017-11-07

## 2017-11-07 VITALS
HEIGHT: 68 IN | SYSTOLIC BLOOD PRESSURE: 118 MMHG | WEIGHT: 156 LBS | DIASTOLIC BLOOD PRESSURE: 73 MMHG | HEART RATE: 86 BPM | BODY MASS INDEX: 23.64 KG/M2 | RESPIRATION RATE: 17 BRPM

## 2017-11-07 DIAGNOSIS — S83.91XA SPRAIN OF RIGHT KNEE, UNSPECIFIED LIGAMENT, INITIAL ENCOUNTER: ICD-10-CM

## 2017-11-07 DIAGNOSIS — M25.561 RIGHT KNEE PAIN, UNSPECIFIED CHRONICITY: Primary | ICD-10-CM

## 2017-11-07 PROCEDURE — 99203 OFFICE O/P NEW LOW 30 MIN: CPT | Performed by: ORTHOPAEDIC SURGERY

## 2017-11-07 PROCEDURE — 73560 X-RAY EXAM OF KNEE 1 OR 2: CPT | Performed by: ORTHOPAEDIC SURGERY

## 2017-11-15 ENCOUNTER — OFFICE VISIT (OUTPATIENT)
Dept: FAMILY MEDICINE CLINIC | Age: 52
End: 2017-11-15

## 2017-11-15 VITALS
BODY MASS INDEX: 24.16 KG/M2 | WEIGHT: 159.4 LBS | DIASTOLIC BLOOD PRESSURE: 72 MMHG | HEIGHT: 68 IN | SYSTOLIC BLOOD PRESSURE: 118 MMHG

## 2017-11-15 DIAGNOSIS — G44.209 ACUTE NON INTRACTABLE TENSION-TYPE HEADACHE: Primary | ICD-10-CM

## 2017-11-15 PROCEDURE — 99214 OFFICE O/P EST MOD 30 MIN: CPT | Performed by: REGISTERED NURSE

## 2017-11-15 PROCEDURE — 96372 THER/PROPH/DIAG INJ SC/IM: CPT | Performed by: REGISTERED NURSE

## 2017-11-15 RX ORDER — IBUPROFEN 800 MG/1
800 TABLET ORAL EVERY 8 HOURS PRN
Qty: 120 TABLET | Refills: 3 | Status: SHIPPED | OUTPATIENT
Start: 2017-11-15 | End: 2017-12-07 | Stop reason: ALTCHOICE

## 2017-11-15 RX ORDER — KETOROLAC TROMETHAMINE 30 MG/ML
30 INJECTION, SOLUTION INTRAMUSCULAR; INTRAVENOUS ONCE
Status: COMPLETED | OUTPATIENT
Start: 2017-11-15 | End: 2017-11-15

## 2017-11-15 RX ADMIN — KETOROLAC TROMETHAMINE 30 MG: 30 INJECTION, SOLUTION INTRAMUSCULAR; INTRAVENOUS at 09:31

## 2017-11-15 ASSESSMENT — ENCOUNTER SYMPTOMS
SINUS PAIN: 0
CHEST TIGHTNESS: 0
SHORTNESS OF BREATH: 0
SINUS PRESSURE: 0
COUGH: 0
STRIDOR: 0

## 2017-11-15 NOTE — PROGRESS NOTES
palpitations. Neurological: Positive for headaches. Negative for dizziness, weakness, light-headedness and numbness. Psychiatric/Behavioral: Positive for dysphoric mood and sleep disturbance (due to HA). Negative for self-injury and suicidal ideas. The patient is nervous/anxious. All other systems reviewed and are negative. Vitals:  /72 (Site: Left Arm, Position: Sitting, Cuff Size: Medium Adult)   Ht 5' 8\" (1.727 m)   Wt 159 lb 6.4 oz (72.3 kg) Comment: SHOES OFF  LMP 02/07/2010   Breastfeeding? No   BMI 24.24 kg/m²     Physical Exam   Constitutional: She is oriented to person, place, and time. Vital signs are normal. She appears well-developed and well-nourished. HENT:   Head: Normocephalic and atraumatic. Right Ear: Tympanic membrane, external ear and ear canal normal.   Left Ear: Tympanic membrane, external ear and ear canal normal.   Nose: Nose normal.   Mouth/Throat: Uvula is midline, oropharynx is clear and moist and mucous membranes are normal.   Eyes: Conjunctivae and EOM are normal. Pupils are equal, round, and reactive to light. Neck: Normal range of motion and full passive range of motion without pain. Neck supple. Cardiovascular: Normal rate, regular rhythm, normal heart sounds and intact distal pulses. Pulmonary/Chest: Effort normal and breath sounds normal.   Neurological: She is alert and oriented to person, place, and time. No cranial nerve deficit. Coordination normal.   Skin: Skin is warm and dry. Psychiatric: She has a normal mood and affect. Her behavior is normal. Judgment and thought content normal.   Nursing note and vitals reviewed. Assessment/Plan     1. Acute non intractable tension-type headache  Will tx with Toradol injection and will start taking Ibuprofen 800 mg for pain. Educated to take with food. Rest and increase fluids.  Suspect depression is situational- will follow up if depressed feelings continue.   - ibuprofen (ADVIL;MOTRIN) 800 MG

## 2017-11-15 NOTE — PATIENT INSTRUCTIONS
Patient Education        Tension Headache: Care Instructions  Your Care Instructions  Most headaches are tension headaches. These headaches tend to happen again, especially if you are under stress. A tension headache may cause pain or a feeling of pressure all over your head. You probably can't pinpoint the center of the pain. If you keep getting tension headaches, the best thing you can do to limit them is to find out what is causing them and then make changes in those areas. Follow-up care is a key part of your treatment and safety. Be sure to make and go to all appointments, and call your doctor if you are having problems. Its also a good idea to know your test results and keep a list of the medicines you take. How can you care for yourself at home? · Rest in a quiet, dark room with a cool cloth on your forehead until your headache is gone. Close your eyes, and try to relax or go to sleep. Don't watch TV or read. Avoid using the computer. · Use a warm, moist towel or a heating pad set on low to relax tight shoulder and neck muscles. · Have someone gently massage your neck and shoulders. · Take pain medicines exactly as directed. ¨ If the doctor gave you a prescription medicine for pain, take it as prescribed. ¨ If you are not taking a prescription pain medicine, ask your doctor if you can take an over-the-counter medicine. · Be careful not to take pain medicine more often than the instructions allow, because you may get worse or more frequent headaches when the medicine wears off. · If you get another tension headache, stop what you are doing and sit quietly for a moment. Close your eyes and breathe slowly. Try to relax your head and neck muscles. · Do not ignore new symptoms that occur with a headache, such as fever, weakness or numbness, vision changes, or confusion. These may be signs of a more serious problem.   To help prevent headaches  · Keep a headache diary so you can figure out what triggers your headaches. Avoiding triggers may help you prevent headaches. Record when each headache began, how long it lasted, and what the pain was like (throbbing, aching, stabbing, or dull). List anything that may have triggered the headache, such as being physically or emotionally stressed or being anxious or depressed. Other possible triggers are hunger, anger, fatigue, poor posture, and muscle strain. · Find healthy ways to deal with stress. Headaches are most common during or right after stressful times. Take time to relax before and after you do something that has caused a headache in the past.  · Exercise daily to relieve stress. Relaxation exercises may help reduce tension. · Get plenty of sleep. · Eat regularly and well. Long periods without food can trigger a headache. · Treat yourself to a massage. Some people find that massages are very helpful in relieving tension. · Try to keep your muscles relaxed by keeping good posture. Check your jaw, face, neck, and shoulder muscles for tension, and try to relax them. When sitting at a desk, change positions often, and stretch for 30 seconds each hour. · Reduce eyestrain from computers by blinking frequently and looking away from the computer screen every so often. Make sure you have proper eyewear and that your monitor is set up properly, about an arms length away. When should you call for help? Call 911 anytime you think you may need emergency care. For example, call if:  · You have signs of a stroke. These may include:  ¨ Sudden numbness, paralysis, or weakness in your face, arm, or leg, especially on only one side of your body. ¨ Sudden vision changes. ¨ Sudden trouble speaking. ¨ Sudden confusion or trouble understanding simple statements. ¨ Sudden problems with walking or balance. ¨ A sudden, severe headache that is different from past headaches.   Call your doctor now or seek immediate medical care if:  · You have new or worse nausea and of the medicines you take. How can you care for yourself at home? Learn about antidepressant medicines  Antidepressant medicines can improve or end the symptoms of depression. You may need to take the medicine for at least 6 months, and often longer. Keep taking your medicine even if you feel better. If you stop taking it too soon, your symptoms may come back or get worse. You may start to feel better within 1 to 3 weeks of taking antidepressant medicine. But it can take as many as 6 to 8 weeks to see more improvement. Talk to your doctor if you have problems with your medicine or if you do not notice any improvement after 3 weeks. Antidepressants can make you feel tired, dizzy, or nervous. Some people have dry mouth, constipation, headaches, sexual problems, an upset stomach, or diarrhea. Many of these side effects are mild and go away on their own after you take the medicine for a few weeks. Some may last longer. Talk to your doctor if side effects bother you too much. You might be able to try a different medicine. If you are pregnant or breastfeeding, talk to your doctor about what medicines you can take. Learn about counseling  In many cases, counseling can work as well as medicines to treat mild to moderate depression. Counseling is done by licensed mental health providers, such as psychologists, social workers, and some types of nurses. It can be done in one-on-one sessions or in a group setting. Many people find group sessions helpful. Cognitive-behavioral therapy is a type of counseling. In this treatment therapy, you learn how to see and change unhelpful thinking styles that may be adding to your depression. Counseling and medicines often work well when used together. To manage depression  · Be physically active. Getting 30 minutes of exercise each day is good for your body and your mind. Begin slowly if it is hard for you to get started. If you already exercise, keep it up.   · Plan something done.  But if stress happens too often or lasts too long, it can have bad effects. Long-term stress can make you more likely to get sick, and it can make symptoms of some diseases worse. If you tense up when you are stressed, you may develop neck, shoulder, or low back pain. Stress is linked to high blood pressure and heart disease. Stress also harms your emotional health. It can make you biswas, tense, or depressed. Your relationships may suffer, and you may not do well at work or school. What can you do to manage stress? How to relax your mind  · Write. It may help to write about things that are bothering you. This helps you find out how much stress you feel and what is causing it. When you know this, you can find better ways to cope. · Let your feelings out. Talk, laugh, cry, and express anger when you need to. Talking with friends, family, a counselor, or a member of the clergy about your feelings is a healthy way to relieve stress. · Do something you enjoy. For example, listen to music or go to a movie. Practice your hobby or do volunteer work. · Meditate. This can help you relax, because you are not worrying about what happened before or what may happen in the future. · Do guided imagery. Imagine yourself in any setting that helps you feel calm. You can use audiotapes, books, or a teacher to guide you. How to relax your body  · Do something active. Exercise or activity can help reduce stress. Walking is a great way to get started. Even everyday activities such as housecleaning or yard work can help. · Do breathing exercises. For example:  ¨ From a standing position, bend forward from the waist with your knees slightly bent. Let your arms dangle close to the floor. ¨ Breathe in slowly and deeply as you return to a standing position. Roll up slowly and lift your head last.  ¨ Hold your breath for just a few seconds in the standing position.   ¨ Breathe out slowly and bend forward from the waist.  · Try yoga or aki chi. These techniques combine exercise and meditation. You may need some training at first to learn them. What can you do to prevent stress? · Manage your time. This helps you find time to do the things you want and need to do. · Get enough sleep. Your body recovers from the stresses of the day while you are sleeping. · Get support. Your family, friends, and community can make a difference in how you experience stress. Where can you learn more? Go to https://[employer]. org and sign in to your [employer] account. Enter H134 in the Ricebook box to learn more about \"Learning About Stress. \"     If you do not have an account, please click on the \"Sign Up Now\" link. Current as of: July 26, 2016  Content Version: 11.3  © 9225-9208 [employer]. Care instructions adapted under license by [facility] ([facility]). If you have questions about a medical condition or this instruction, always ask your healthcare professional. [address] any warranty or liability for your use of this information.

## 2017-11-17 ENCOUNTER — TELEPHONE (OUTPATIENT)
Dept: FAMILY MEDICINE CLINIC | Age: 52
End: 2017-11-17

## 2017-11-17 DIAGNOSIS — E78.00 HYPERCHOLESTEROLEMIA: ICD-10-CM

## 2017-11-17 RX ORDER — ATORVASTATIN CALCIUM 10 MG/1
10 TABLET, FILM COATED ORAL DAILY
Qty: 30 TABLET | Refills: 2 | Status: SHIPPED | OUTPATIENT
Start: 2017-11-17 | End: 2018-05-08 | Stop reason: SDUPTHER

## 2017-11-20 DIAGNOSIS — R51.9 NEW ONSET OF HEADACHES: Primary | ICD-10-CM

## 2017-11-27 ENCOUNTER — HOSPITAL ENCOUNTER (OUTPATIENT)
Dept: CT IMAGING | Age: 52
Discharge: HOME | End: 2017-11-27
Attending: NURSE PRACTITIONER | Admitting: NURSE PRACTITIONER

## 2017-11-27 ENCOUNTER — TELEPHONE (OUTPATIENT)
Dept: FAMILY MEDICINE CLINIC | Age: 52
End: 2017-11-27

## 2017-11-27 DIAGNOSIS — R51.9 NEW ONSET OF HEADACHES: ICD-10-CM

## 2017-11-27 DIAGNOSIS — R51.9 HEADACHE: ICD-10-CM

## 2017-11-27 NOTE — TELEPHONE ENCOUNTER
Pt has a CT appt today with contrast at 11:40 am.  She is allergic to the dye. Should pt drink something prior the scan? Pt has a RX to help her with the itching should she get . Should she take this prior?       Please call

## 2017-11-28 DIAGNOSIS — E23.6 PITUITARY MASS (HCC): Primary | ICD-10-CM

## 2017-12-02 ENCOUNTER — HOSPITAL ENCOUNTER (OUTPATIENT)
Dept: MRI IMAGING | Age: 52
Discharge: HOME | End: 2017-12-02
Attending: NURSE PRACTITIONER | Admitting: NURSE PRACTITIONER

## 2017-12-02 DIAGNOSIS — E23.7 DISORDER OF PITUITARY GLAND (HCC): ICD-10-CM

## 2017-12-02 DIAGNOSIS — E23.6 PITUITARY MASS (HCC): ICD-10-CM

## 2017-12-02 RX ORDER — SODIUM CHLORIDE 0.9 % (FLUSH) 0.9 %
10 SYRINGE (ML) INJECTION ONCE
Status: COMPLETED | OUTPATIENT
Start: 2017-12-02 | End: 2017-12-02

## 2017-12-02 RX ADMIN — Medication 10 ML: at 13:33

## 2017-12-03 DIAGNOSIS — E23.6 PITUITARY MASS (HCC): Primary | ICD-10-CM

## 2017-12-04 ENCOUNTER — TELEPHONE (OUTPATIENT)
Dept: FAMILY MEDICINE CLINIC | Age: 52
End: 2017-12-04

## 2017-12-04 NOTE — TELEPHONE ENCOUNTER
PT CALLING FOR RESULTS OF MRI OF THE BRAIN  - 12/02/17 DONE AT [facility]    WANTING TO SPEAK TO GAVIN GRIER @  [phone]

## 2017-12-07 ENCOUNTER — OFFICE VISIT (OUTPATIENT)
Dept: FAMILY MEDICINE CLINIC | Age: 52
End: 2017-12-07

## 2017-12-07 VITALS
WEIGHT: 159.6 LBS | BODY MASS INDEX: 24.19 KG/M2 | SYSTOLIC BLOOD PRESSURE: 116 MMHG | DIASTOLIC BLOOD PRESSURE: 72 MMHG | HEIGHT: 68 IN

## 2017-12-07 DIAGNOSIS — M25.50 ARTHRALGIA, UNSPECIFIED JOINT: ICD-10-CM

## 2017-12-07 DIAGNOSIS — R53.1 WEAKNESS: ICD-10-CM

## 2017-12-07 DIAGNOSIS — L30.9 DERMATITIS: Primary | ICD-10-CM

## 2017-12-07 DIAGNOSIS — M79.10 MYALGIA: ICD-10-CM

## 2017-12-07 LAB
A/G RATIO: 1.6 (ref 1.1–2.2)
ALBUMIN SERPL-MCNC: 4.5 G/DL (ref 3.4–5)
ALP BLD-CCNC: 51 U/L (ref 40–129)
ALT SERPL-CCNC: 20 U/L (ref 10–40)
ANION GAP SERPL CALCULATED.3IONS-SCNC: 24 MMOL/L (ref 3–16)
AST SERPL-CCNC: 25 U/L (ref 15–37)
BASOPHILS ABSOLUTE: 0 K/UL (ref 0–0.2)
BASOPHILS RELATIVE PERCENT: 0.1 %
BILIRUB SERPL-MCNC: 0.5 MG/DL (ref 0–1)
BUN BLDV-MCNC: 7 MG/DL (ref 7–20)
C-REACTIVE PROTEIN: 4.9 MG/L (ref 0–5.1)
CALCIUM SERPL-MCNC: 10.2 MG/DL (ref 8.3–10.6)
CHLORIDE BLD-SCNC: 106 MMOL/L (ref 99–110)
CO2: 17 MMOL/L (ref 21–32)
CREAT SERPL-MCNC: 0.7 MG/DL (ref 0.6–1.1)
EOSINOPHILS ABSOLUTE: 0.1 K/UL (ref 0–0.6)
EOSINOPHILS RELATIVE PERCENT: 1.4 %
GFR AFRICAN AMERICAN: >60
GFR NON-AFRICAN AMERICAN: >60
GLOBULIN: 2.9 G/DL
GLUCOSE BLD-MCNC: 108 MG/DL (ref 70–99)
HCT VFR BLD CALC: 32.6 % (ref 36–48)
HEMOGLOBIN: 10.6 G/DL (ref 12–16)
LYMPHOCYTES ABSOLUTE: 2.2 K/UL (ref 1–5.1)
LYMPHOCYTES RELATIVE PERCENT: 50.9 %
MCH RBC QN AUTO: 25.5 PG (ref 26–34)
MCHC RBC AUTO-ENTMCNC: 32.4 G/DL (ref 31–36)
MCV RBC AUTO: 78.7 FL (ref 80–100)
MONOCYTES ABSOLUTE: 0.5 K/UL (ref 0–1.3)
MONOCYTES RELATIVE PERCENT: 11.3 %
NEUTROPHILS ABSOLUTE: 1.6 K/UL (ref 1.7–7.7)
NEUTROPHILS RELATIVE PERCENT: 36.3 %
PDW BLD-RTO: 15.6 % (ref 12.4–15.4)
PLATELET # BLD: 334 K/UL (ref 135–450)
PMV BLD AUTO: 8.1 FL (ref 5–10.5)
POTASSIUM SERPL-SCNC: 4.3 MMOL/L (ref 3.5–5.1)
RBC # BLD: 4.14 M/UL (ref 4–5.2)
RHEUMATOID FACTOR: <10 IU/ML
SEDIMENTATION RATE, ERYTHROCYTE: 35 MM/HR (ref 0–30)
SODIUM BLD-SCNC: 147 MMOL/L (ref 136–145)
TOTAL PROTEIN: 7.4 G/DL (ref 6.4–8.2)
TSH REFLEX: 1.31 UIU/ML (ref 0.27–4.2)
WBC # BLD: 4.4 K/UL (ref 4–11)

## 2017-12-07 PROCEDURE — 36415 COLL VENOUS BLD VENIPUNCTURE: CPT | Performed by: NURSE PRACTITIONER

## 2017-12-07 PROCEDURE — 99214 OFFICE O/P EST MOD 30 MIN: CPT | Performed by: NURSE PRACTITIONER

## 2017-12-07 RX ORDER — PREDNISONE 10 MG/1
TABLET ORAL
Qty: 30 TABLET | Refills: 0 | Status: SHIPPED | OUTPATIENT
Start: 2017-12-07 | End: 2018-01-17 | Stop reason: ALTCHOICE

## 2017-12-07 NOTE — PROGRESS NOTES
oropharyngeal erythema. Cardiovascular: Normal rate and regular rhythm. Exam reveals no gallop and no friction rub. No murmur heard. Pulmonary/Chest: Effort normal and breath sounds normal. No respiratory distress. She has no wheezes. Musculoskeletal: She exhibits tenderness (pain over hand joints and stifness. ). She exhibits no edema. Weakness throughout. Moving slowly. Stiff and arthralgic gait. Lymphadenopathy:     She has no cervical adenopathy. Neurological: She is alert and oriented to person, place, and time. Skin: Skin is warm and dry. She is not diaphoretic. No erythema. Psychiatric: Mood, memory, affect and judgment normal.   Nursing note and vitals reviewed. Vitals:    12/07/17 0800   BP: 116/72   Site: Left Arm   Position: Sitting   Cuff Size: Medium Adult   Weight: 159 lb 9.6 oz (72.4 kg)   Height: 5' 8\" (1.727 m)     Body mass index is 24.27 kg/m². Wt Readings from Last 3 Encounters:   12/07/17 159 lb 9.6 oz (72.4 kg)   11/15/17 159 lb 6.4 oz (72.3 kg)   11/07/17 156 lb (70.8 kg)     BP Readings from Last 3 Encounters:   12/07/17 116/72   11/15/17 118/72   11/07/17 118/73        No results found for this visit on 12/07/17. Assessment    1. Dermatitis  predniSONE (DELTASONE) 10 MG tablet    CBC Auto Differential    TSH with Reflex    NUPUR    Rheumatoid Factor    C-Reactive Protein    Sedimentation Rate    Comprehensive Metabolic Panel    CBC Auto Differential    TSH with Reflex    NUPUR    Rheumatoid Factor    C-Reactive Protein    Sedimentation Rate    Comprehensive Metabolic Panel   2. Weakness  predniSONE (DELTASONE) 10 MG tablet    CBC Auto Differential    TSH with Reflex    NUPUR    Rheumatoid Factor    C-Reactive Protein    Sedimentation Rate    Comprehensive Metabolic Panel    CBC Auto Differential    TSH with Reflex    NUPUR    Rheumatoid Factor    C-Reactive Protein    Sedimentation Rate    Comprehensive Metabolic Panel   3.  Myalgia  predniSONE (DELTASONE) 10 MG

## 2017-12-08 LAB
ANA INTERPRETATION: NORMAL
ANTI-NUCLEAR ANTIBODY (ANA): NEGATIVE

## 2017-12-11 ENCOUNTER — HOSPITAL ENCOUNTER (OUTPATIENT)
Dept: OTHER | Age: 52
Discharge: HOME | End: 2017-12-11
Attending: NEUROLOGICAL SURGERY | Admitting: NEUROLOGICAL SURGERY

## 2017-12-11 LAB
CORTISOL - AM: <0.8 UG/DL (ref 4.3–22.4)
ESTRADIOL LEVEL: <5 PG/ML
FOLLICLE STIMULATING HORMONE: 2.5 MIU/ML
LUTEINIZING HORMONE: <0.1 MIU/ML
PROLACTIN: 54.3 NG/ML
T4 FREE: 0.5 NG/DL (ref 0.9–1.8)
TSH SERPL DL<=0.05 MIU/L-ACNC: 1.76 UIU/ML (ref 0.27–4.2)

## 2017-12-12 LAB
IGF-1 (INSULIN-LIKE GROWTH I): 74 NG/ML (ref 57–236)
INSULIN-LIKE GROWTH FACTOR-1 Z-SCORE: -1.4

## 2017-12-13 LAB
ADRENOCORTICOTROPIC HORMONE: <5 PG/ML (ref 6–58)
GROWTH HORMONE: <0.05 NG/ML (ref 0.05–8)

## 2018-01-17 ENCOUNTER — OFFICE VISIT (OUTPATIENT)
Dept: FAMILY MEDICINE CLINIC | Age: 53
End: 2018-01-17

## 2018-01-17 VITALS
WEIGHT: 151 LBS | TEMPERATURE: 98.5 F | HEIGHT: 68 IN | BODY MASS INDEX: 22.88 KG/M2 | DIASTOLIC BLOOD PRESSURE: 68 MMHG | SYSTOLIC BLOOD PRESSURE: 112 MMHG

## 2018-01-17 DIAGNOSIS — R53.1 WEAKNESS: Primary | ICD-10-CM

## 2018-01-17 DIAGNOSIS — L30.9 DERMATITIS: ICD-10-CM

## 2018-01-17 DIAGNOSIS — N39.3 STRESS INCONTINENCE: ICD-10-CM

## 2018-01-17 PROCEDURE — 99213 OFFICE O/P EST LOW 20 MIN: CPT | Performed by: NURSE PRACTITIONER

## 2018-01-17 RX ORDER — DESONIDE 0.5 MG/G
CREAM TOPICAL
Qty: 30 G | Refills: 0 | Status: SHIPPED | OUTPATIENT
Start: 2018-01-17 | End: 2018-03-06 | Stop reason: ALTCHOICE

## 2018-01-17 RX ORDER — TOLTERODINE 4 MG/1
4 CAPSULE, EXTENDED RELEASE ORAL DAILY
Qty: 30 CAPSULE | Refills: 3 | Status: SHIPPED | OUTPATIENT
Start: 2018-01-17 | End: 2018-09-10 | Stop reason: ALTCHOICE

## 2018-03-02 ENCOUNTER — HOSPITAL ENCOUNTER (OUTPATIENT)
Dept: ENDOSCOPY | Age: 53
Discharge: HOME | End: 2018-03-02
Attending: INTERNAL MEDICINE | Admitting: INTERNAL MEDICINE

## 2018-03-02 NOTE — ANESTHESIA POST-OP
Anesthesia Post-op Note    Patient: Cj Vicente  MRN: [id number]  YOB: 1965  Date of evaluation: 3/2/2018  Time:  11:05 AM     Procedure(s) Performed:     Last Vitals: LMP 02/07/2010     Sridhar Phase I:      Sridhar Phase II:      Anesthesia Post Evaluation    Final anesthesia type: TIVA and MAC  Level of consciousness: awake and alert  Complications: no  Respiratory status: acceptable  Hydration status: euvolemic        Arelis Castellanos MD  11:05 AM

## 2018-03-07 ENCOUNTER — OFFICE VISIT (OUTPATIENT)
Dept: FAMILY MEDICINE CLINIC | Age: 53
End: 2018-03-07

## 2018-03-07 VITALS
SYSTOLIC BLOOD PRESSURE: 118 MMHG | WEIGHT: 150.8 LBS | TEMPERATURE: 98.2 F | HEART RATE: 100 BPM | RESPIRATION RATE: 20 BRPM | OXYGEN SATURATION: 97 % | HEIGHT: 68 IN | DIASTOLIC BLOOD PRESSURE: 78 MMHG | BODY MASS INDEX: 22.85 KG/M2

## 2018-03-07 DIAGNOSIS — R93.0 MASS IN REGION OF SELLA TURCICA PRESENT ON MAGNETIC RESONANCE IMAGING: ICD-10-CM

## 2018-03-07 DIAGNOSIS — Z01.818 PRE-OP EXAMINATION: Primary | ICD-10-CM

## 2018-03-07 DIAGNOSIS — J45.30 MILD PERSISTENT ASTHMA, UNSPECIFIED WHETHER COMPLICATED: ICD-10-CM

## 2018-03-07 LAB
ANION GAP SERPL CALCULATED.3IONS-SCNC: 15 MMOL/L (ref 3–16)
APTT: 35.2 SEC (ref 24.1–34.9)
BUN BLDV-MCNC: 17 MG/DL (ref 7–20)
CALCIUM SERPL-MCNC: 9.8 MG/DL (ref 8.3–10.6)
CHLORIDE BLD-SCNC: 104 MMOL/L (ref 99–110)
CO2: 24 MMOL/L (ref 21–32)
CREAT SERPL-MCNC: 0.7 MG/DL (ref 0.6–1.1)
GFR AFRICAN AMERICAN: >60
GFR NON-AFRICAN AMERICAN: >60
GLUCOSE BLD-MCNC: 104 MG/DL (ref 70–99)
HCT VFR BLD CALC: 33.5 % (ref 36–48)
HEMOGLOBIN: 11.1 G/DL (ref 12–16)
INR BLD: 1.01 (ref 0.85–1.15)
MCH RBC QN AUTO: 25.1 PG (ref 26–34)
MCHC RBC AUTO-ENTMCNC: 33 G/DL (ref 31–36)
MCV RBC AUTO: 76 FL (ref 80–100)
PDW BLD-RTO: 15.6 % (ref 12.4–15.4)
PLATELET # BLD: 336 K/UL (ref 135–450)
PMV BLD AUTO: 7.5 FL (ref 5–10.5)
POTASSIUM SERPL-SCNC: 4.3 MMOL/L (ref 3.5–5.1)
PROTHROMBIN TIME: 11.4 SEC (ref 9.6–13)
RBC # BLD: 4.4 M/UL (ref 4–5.2)
SODIUM BLD-SCNC: 143 MMOL/L (ref 136–145)
WBC # BLD: 4.9 K/UL (ref 4–11)

## 2018-03-07 PROCEDURE — 90732 PPSV23 VACC 2 YRS+ SUBQ/IM: CPT | Performed by: NURSE PRACTITIONER

## 2018-03-07 PROCEDURE — 99243 OFF/OP CNSLTJ NEW/EST LOW 30: CPT | Performed by: NURSE PRACTITIONER

## 2018-03-07 PROCEDURE — 90471 IMMUNIZATION ADMIN: CPT | Performed by: NURSE PRACTITIONER

## 2018-03-07 PROCEDURE — 36415 COLL VENOUS BLD VENIPUNCTURE: CPT | Performed by: NURSE PRACTITIONER

## 2018-03-07 PROCEDURE — 93000 ELECTROCARDIOGRAM COMPLETE: CPT | Performed by: NURSE PRACTITIONER

## 2018-03-07 NOTE — PROGRESS NOTES
Preoperative Consultation   Pt presents for pre op examination per Dr Jericho Monroe for removal of pituitary adenoma- symptoms started in November with  Mild headaches that persistently worsened- CT of head indicated a focal pituitary mass- presumably pituitary macroadenoma- MRI indicated the same    Radha Stack  YOB: 1965    Date of Service:  3/7/2018    There were no vitals filed for this visit. Wt Readings from Last 2 Encounters:   03/06/18 145 lb (65.8 kg)   01/17/18 151 lb (68.5 kg)     BP Readings from Last 3 Encounters:   01/17/18 112/68   12/07/17 116/72   11/15/17 118/72        Chief Complaint   Patient presents with    Pre-op Exam     Allergies   Allergen Reactions    Contrast [Iodides]      Rash all over body, low grade fever, body aches. Went to ER next day for treatment. No outpatient prescriptions have been marked as taking for the 3/7/18 encounter (Office Visit) with Jane Yuan CNP. This patient presents to the office today for a preoperative consultation at the request of surgeon, Dr. Jericho Monroe, who plans on performing removal pf pituitary macreadenoma  on March 15 at [facility]. The current problem began 4 months ago, and symptoms have been unchanged with time.   Conservative therapy: No.    Planned anesthesia: General   Known anesthesia problems: None   Bleeding risk: No recent or remote history of abnormal bleeding  Personal or FH of DVT/PE: No    Patient objection to receiving blood products: No    Patient Active Problem List   Diagnosis    Umbilical hernia without obstruction and without gangrene    Hyperlipidemia    Anemia    Fatigue    Vitamin D deficiency    Hypertriglyceridemia    Asthma    Periumbilical pain    Incarcerated epigastric hernia       Past Medical History:   Diagnosis Date    Anemia     iron defieciency    Asthma     Asthma     Brain tumor ([facility] [address]     surgery for brain tumor 3/15/18    Hemorrhagic cyst of ovary 2007    Left hepatosplenomegaly. No tenderness. Musculoskeletal: She exhibits no edema and no tenderness. Neurological: She is alert and oriented to person, place, and time. She has normal strength. No cranial nerve deficit or sensory deficit. Coordination and gait normal.   Skin: Skin is warm and dry. No rash noted. No erythema. Psychiatric: She has a normal mood and affect. Her behavior is normal.     EKG Interpretation:  sinus bradycardia. Lab Review not applicable        Assessment:      1. Pre-op examination     2. Mass in region of sella turcica present on magnetic resonance imaging     3. Mild persistent asthma, unspecified whether complicated  Pneumococcal polysaccharide vaccine 23-valent greater than or equal to 3yo subcutaneous/IM      50 y.o. patient with planned surgery as above. Known risk factors for perioperative complications: None  Current medications which may produce withdrawal symptoms if withheld perioperatively: none      Plan:     1. Preoperative workup as follows: ECG, hemoglobin, hematocrit, electrolytes, coagulation studies  2. Change in medication regimen before surgery: None  3. Prophylaxis for cardiac events with perioperative beta-blockers: Not indicated  ACC/AHA indications for pre-operative beta-blocker use:    · Vascular surgery with history of postitive stress test  · Intermediate or high risk surgery with history of CAD   · Intermediate or high risk surgery with multiple clinical predictors of CAD- 2 of the following: history of compensated or prior heart failure, history of cerebrovascular disease, DM, or renal insufficiency    Routine administration of higher-dose, long-acting metoprolol in beta-blockernaïve patients on the day of surgery, and in the absence of dose titration is associated with an overall increase in mortality.   Beta-blockers should be started days to weeks prior to surgery and titrated to pulse < 70.  4. Deep vein thrombosis prophylaxis: regimen to be chosen by surgical team  5. No contraindications to planned surgery  Paperwork faxed to [phone]  and copy emailed to [email or web address]

## 2018-03-08 ENCOUNTER — TELEPHONE (OUTPATIENT)
Dept: FAMILY MEDICINE CLINIC | Age: 53
End: 2018-03-08

## 2018-03-08 NOTE — TELEPHONE ENCOUNTER
None of the medications she is taking need to be stopped for surgery. I would hold the Detrol for the surgery as not an essential medication. All other medications can be taken with a siip of water the am of surgery.

## 2018-03-14 ENCOUNTER — HOSPITAL ENCOUNTER (OUTPATIENT)
Dept: ENDOSCOPY | Age: 53
Discharge: HOME | End: 2018-03-14
Attending: INTERNAL MEDICINE | Admitting: INTERNAL MEDICINE

## 2018-03-14 NOTE — CONSULTS
Department of Orthopedic Surgery  Physician Assistant Consult Note        Reason for Consult:  Left knee pain - patella dislocation   Requesting Physician:  Dr. Gm Gomez:  Left knee pain - possible patella dislocation. History Obtained From:  Patient    HISTORY OF PRESENT ILLNESS:                The patient is a 50 y.o. female who is in the endoscopy PACU with left knee pain that began after her endoscopy this morning. Dr. Andreia Sung was consulted concerning a possible left patella dislocation that occurred during patients colonoscopy. Patient experienced no trauma. She denies pain in her left hip, ankle or foot at this time. She denies numbness or tingling in the left leg. She states she does have a history of right patella dislocation after a fall in November 2017. She did see orthopedic surgeon, Dr. Trent Briones. Past Medical History:        Diagnosis Date    Anemia     iron defieciency    Asthma     Asthma     Brain tumor ([facility] [address]     surgery for brain tumor 3/15/18    Hemorrhagic cyst of ovary 2007    Left    Hyperlipidemia      Past Surgical History:        Procedure Laterality Date    EPIGASTRIC HERNIA REPAIR  10/30/2017    with mesh     HERNIA REPAIR      OTHER SURGICAL HISTORY      lump removed from under left arm pit     TUBAL LIGATION      UMBILICAL HERNIA REPAIR  12/06/2016    and umbilectomy     Current Medications:   No current facility-administered medications for this encounter. Allergies:  Contrast [iodides]     Family History:       Problem Relation Age of Onset    High Cholesterol Mother     Diabetes Mother     High Cholesterol Father     Diabetes Father     Cancer Maternal Grandmother      colon    Cancer Paternal Grandmother      colon cancer     PHYSICAL EXAM:      VITALS: @0930am   Blood Pressure: 117/69   Pulse: 52   Heart Rate: 18   Oxygen on RA: 100%    MUSCULOSKELETAL: LEFT KNEE: Patient has a obvious lateral patellar dislocation.  There is no appreciable and has very minimal pain. She is fitted for crutches and a knee immobilizer is placed for her comfort and to prevent another dislocation. She is having brain surgery tomorrow for a pituitary adenoma. She is advised to follow-up in the office at her convenience.            Electronically signed by Sandy Berger PA-C on 9/60/0263  Board Certified Physician Assistant  [id number] [facility]

## 2018-03-14 NOTE — ANESTHESIA PRE-OP
Department of Anesthesiology  Preprocedure Note       Name:  Hedy López   Age:  50 y.o.  :  1965                                          MRN:  [id number]         Date:  3/14/2018      Surgeon:    Procedure:    Medications prior to admission:   Prior to Admission medications    Medication Sig Start Date End Date Taking?  Authorizing Provider   levothyroxine (SYNTHROID) 25 MCG tablet Take 25 mcg by mouth Daily    Historical Provider, MD   hydrocortisone (CORTEF) 10 MG tablet Take 10 mg by mouth 2 times daily    Historical Provider, MD   tolterodine (DETROL LA) 4 MG extended release capsule Take 1 capsule by mouth daily  Patient taking differently: Take 4 mg by mouth as needed  18   Rodrigo Sunshine CNP   atorvastatin (LIPITOR) 10 MG tablet Take 1 tablet by mouth daily 17   Jose Chau CNP   venlafaxine (EFFEXOR XR) 37.5 MG extended release capsule TAKE 1 CAPSULE BY MOUTH DAILY 10/27/17   Jose Chau CNP   ferrous sulfate 325 (65 FE) MG tablet TAKE 1 TABLET BY MOUTH TWICE DAILY WITH MEALS  Patient taking differently: daily (with breakfast) TAKE 1 TABLET BY MOUTH TWICE DAILY WITH MEALS 3/31/17   Jose Chau CNP       Current medications:    Current Outpatient Prescriptions   Medication Sig Dispense Refill    levothyroxine (SYNTHROID) 25 MCG tablet Take 25 mcg by mouth Daily      hydrocortisone (CORTEF) 10 MG tablet Take 10 mg by mouth 2 times daily      tolterodine (DETROL LA) 4 MG extended release capsule Take 1 capsule by mouth daily (Patient taking differently: Take 4 mg by mouth as needed ) 30 capsule 3    atorvastatin (LIPITOR) 10 MG tablet Take 1 tablet by mouth daily 30 tablet 2    venlafaxine (EFFEXOR XR) 37.5 MG extended release capsule TAKE 1 CAPSULE BY MOUTH DAILY 30 capsule 0    ferrous sulfate 325 (65 FE) MG tablet TAKE 1 TABLET BY MOUTH TWICE DAILY WITH MEALS (Patient taking differently: daily (with breakfast) TAKE 1 TABLET BY MOUTH TWICE DAILY WITH MEALS) 60 tablet 5     No current facility-administered medications for this encounter. Allergies: Allergies   Allergen Reactions    Contrast [Iodides]      Rash all over body, low grade fever, body aches. Went to ER next day for treatment. Problem List:    Patient Active Problem List   Diagnosis Code    Umbilical hernia without obstruction and without gangrene K42.9    Hyperlipidemia E78.5    Anemia D64.9    Fatigue R53.83    Vitamin D deficiency E55.9    Hypertriglyceridemia E78.1    Asthma G69.316    Periumbilical pain R51.65    Incarcerated epigastric hernia K43.6       Past Medical History:        Diagnosis Date    Anemia     iron defieciency    Asthma     Asthma     Brain tumor ([facility] [address]     surgery for brain tumor 3/15/18    Hemorrhagic cyst of ovary 2007    Left    Hyperlipidemia        Past Surgical History:        Procedure Laterality Date    EPIGASTRIC HERNIA REPAIR  10/30/2017    with mesh     HERNIA REPAIR      OTHER SURGICAL HISTORY      lump removed from under left arm pit     TUBAL LIGATION      UMBILICAL HERNIA REPAIR  12/06/2016    and umbilectomy       Social History:    Social History   Substance Use Topics    Smoking status: Never Smoker    Smokeless tobacco: Never Used    Alcohol use No                                Counseling given: Not Answered      Vital Signs (Current): There were no vitals filed for this visit. BP Readings from Last 3 Encounters:   03/07/18 118/78   01/17/18 112/68   12/07/17 116/72       NPO Status:  more than 8 hrs                                                                               BMI:   Wt Readings from Last 3 Encounters:   03/07/18 150 lb 12.8 oz (68.4 kg)   03/06/18 145 lb (65.8 kg)   01/17/18 151 lb (68.5 kg)     There is no height or weight on file to calculate BMI.     Anesthesia Evaluation  Patient summary reviewed no history of anesthetic

## 2018-07-01 DIAGNOSIS — E78.00 HYPERCHOLESTEROLEMIA: ICD-10-CM

## 2018-07-02 RX ORDER — ATORVASTATIN CALCIUM 10 MG/1
TABLET, FILM COATED ORAL
Qty: 30 TABLET | Refills: 2 | Status: SHIPPED | OUTPATIENT
Start: 2018-07-02 | End: 2018-10-09 | Stop reason: SDUPTHER

## 2018-08-21 ENCOUNTER — OFFICE VISIT (OUTPATIENT)
Dept: FAMILY MEDICINE CLINIC | Age: 53
End: 2018-08-21

## 2018-08-21 VITALS
OXYGEN SATURATION: 99 % | RESPIRATION RATE: 18 BRPM | WEIGHT: 168.6 LBS | HEIGHT: 68 IN | HEART RATE: 54 BPM | DIASTOLIC BLOOD PRESSURE: 68 MMHG | BODY MASS INDEX: 25.55 KG/M2 | TEMPERATURE: 98.7 F | SYSTOLIC BLOOD PRESSURE: 110 MMHG

## 2018-08-21 DIAGNOSIS — H10.31 ACUTE CONJUNCTIVITIS OF RIGHT EYE, UNSPECIFIED ACUTE CONJUNCTIVITIS TYPE: Primary | ICD-10-CM

## 2018-08-21 PROBLEM — D35.2 PITUITARY ADENOMA WITH EXTRASELLAR EXTENSION (HCC): Status: ACTIVE | Noted: 2018-03-15

## 2018-08-21 PROCEDURE — 99213 OFFICE O/P EST LOW 20 MIN: CPT | Performed by: FAMILY MEDICINE

## 2018-08-21 RX ORDER — TOBRAMYCIN 3 MG/ML
1 SOLUTION/ DROPS OPHTHALMIC EVERY 4 HOURS
Qty: 1 BOTTLE | Refills: 0 | Status: SHIPPED | OUTPATIENT
Start: 2018-08-21 | End: 2018-08-28

## 2018-09-10 ENCOUNTER — OFFICE VISIT (OUTPATIENT)
Dept: FAMILY MEDICINE CLINIC | Age: 53
End: 2018-09-10

## 2018-09-10 VITALS
HEIGHT: 68 IN | BODY MASS INDEX: 25.43 KG/M2 | OXYGEN SATURATION: 98 % | RESPIRATION RATE: 21 BRPM | SYSTOLIC BLOOD PRESSURE: 110 MMHG | DIASTOLIC BLOOD PRESSURE: 70 MMHG | TEMPERATURE: 98.5 F | HEART RATE: 68 BPM | WEIGHT: 167.8 LBS

## 2018-09-10 DIAGNOSIS — Z13.1 DIABETES MELLITUS SCREENING: ICD-10-CM

## 2018-09-10 DIAGNOSIS — Z00.00 ANNUAL PHYSICAL EXAM: Primary | ICD-10-CM

## 2018-09-10 DIAGNOSIS — Z23 NEED FOR SHINGLES VACCINE: ICD-10-CM

## 2018-09-10 DIAGNOSIS — E78.2 MIXED HYPERLIPIDEMIA: ICD-10-CM

## 2018-09-10 DIAGNOSIS — Z23 NEED FOR INFLUENZA VACCINATION: ICD-10-CM

## 2018-09-10 LAB
A/G RATIO: 1.6 (ref 1.1–2.2)
ALBUMIN SERPL-MCNC: 4.6 G/DL (ref 3.4–5)
ALP BLD-CCNC: 69 U/L (ref 40–129)
ALT SERPL-CCNC: 11 U/L (ref 10–40)
ANION GAP SERPL CALCULATED.3IONS-SCNC: 14 MMOL/L (ref 3–16)
AST SERPL-CCNC: 15 U/L (ref 15–37)
BILIRUB SERPL-MCNC: 0.3 MG/DL (ref 0–1)
BUN BLDV-MCNC: 10 MG/DL (ref 7–20)
CALCIUM SERPL-MCNC: 10 MG/DL (ref 8.3–10.6)
CHLORIDE BLD-SCNC: 104 MMOL/L (ref 99–110)
CHOLESTEROL, TOTAL: 191 MG/DL (ref 0–199)
CO2: 24 MMOL/L (ref 21–32)
CREAT SERPL-MCNC: 0.7 MG/DL (ref 0.6–1.1)
GFR AFRICAN AMERICAN: >60
GFR NON-AFRICAN AMERICAN: >60
GLOBULIN: 2.8 G/DL
GLUCOSE BLD-MCNC: 99 MG/DL (ref 70–99)
HDLC SERPL-MCNC: 43 MG/DL (ref 40–60)
LDL CHOLESTEROL CALCULATED: 121 MG/DL
POTASSIUM SERPL-SCNC: 4.5 MMOL/L (ref 3.5–5.1)
SODIUM BLD-SCNC: 142 MMOL/L (ref 136–145)
TOTAL PROTEIN: 7.4 G/DL (ref 6.4–8.2)
TRIGL SERPL-MCNC: 133 MG/DL (ref 0–150)
VLDLC SERPL CALC-MCNC: 27 MG/DL

## 2018-09-10 PROCEDURE — 90471 IMMUNIZATION ADMIN: CPT | Performed by: NURSE PRACTITIONER

## 2018-09-10 PROCEDURE — 90688 IIV4 VACCINE SPLT 0.5 ML IM: CPT | Performed by: NURSE PRACTITIONER

## 2018-09-10 PROCEDURE — 99396 PREV VISIT EST AGE 40-64: CPT | Performed by: NURSE PRACTITIONER

## 2018-09-10 PROCEDURE — 36415 COLL VENOUS BLD VENIPUNCTURE: CPT | Performed by: NURSE PRACTITIONER

## 2018-09-10 RX ORDER — ACYCLOVIR 200 MG/1
200 CAPSULE ORAL 3 TIMES DAILY
COMMUNITY
End: 2019-02-07 | Stop reason: SDUPTHER

## 2018-09-10 ASSESSMENT — ENCOUNTER SYMPTOMS
ABDOMINAL PAIN: 0
WHEEZING: 0
NAUSEA: 0
SHORTNESS OF BREATH: 0
CONSTIPATION: 0
CHOKING: 0
CHEST TIGHTNESS: 0

## 2018-09-10 ASSESSMENT — PATIENT HEALTH QUESTIONNAIRE - PHQ9
2. FEELING DOWN, DEPRESSED OR HOPELESS: 0
SUM OF ALL RESPONSES TO PHQ QUESTIONS 1-9: 0
SUM OF ALL RESPONSES TO PHQ9 QUESTIONS 1 & 2: 0
SUM OF ALL RESPONSES TO PHQ QUESTIONS 1-9: 0
1. LITTLE INTEREST OR PLEASURE IN DOING THINGS: 0

## 2018-09-10 NOTE — PROGRESS NOTES
History and Physical      Amy Wiley  YOB: 1965    Date of Service:  9/10/2018    Chief Complaint:   Amy Wiley is a 48 y.o. female who presents for complete physical examination. HPI: ***    Wt Readings from Last 3 Encounters:   08/21/18 168 lb 9.6 oz (76.5 kg)   03/07/18 150 lb 12.8 oz (68.4 kg)   03/06/18 145 lb (65.8 kg)     BP Readings from Last 3 Encounters:   08/21/18 110/68   03/07/18 118/78   01/17/18 112/68       Patient Active Problem List   Diagnosis    Hyperlipidemia    Anemia    Vitamin D deficiency    Hypertriglyceridemia    Asthma    Pituitary adenoma with extrasellar extension (HCC)       Allergies   Allergen Reactions    Contrast [Iodides]      Rash all over body, low grade fever, body aches. Went to ER next day for treatment. No outpatient prescriptions have been marked as taking for the 9/10/18 encounter (Office Visit) with SHON Youssef CNP. Past Medical History:   Diagnosis Date    Anemia     iron defieciency    Asthma     Asthma     Brain tumor ([facility] [address]     surgery for brain tumor 3/15/18    Hemorrhagic cyst of ovary 2007    Left    Hyperlipidemia     Tubular adenoma of colon 03/02/2018    COLONOSCOPY, DR CLAUDINE KHAN, TUBULAR ADENOMAS ASCENDING COLON, 3 MM, 4 MM, 5 MM REMOVED. Past Surgical History:   Procedure Laterality Date    COLONOSCOPY  03/02/2018    COLONOSCOPY, DR CLAUDINE KHAN, 3 MM, 4 MM, 5 MM ASCENDING COLON POLYP REMOVED. LIMITED PREP. REPEAT 3-6 MONTHS.    [place] REPAIR  10/30/2017    with mesh     HERNIA REPAIR      OTHER SURGICAL HISTORY      lump removed from under left arm pit     TUBAL LIGATION      UMBILICAL HERNIA REPAIR  12/06/2016    and umbilectomy     Family History   Problem Relation Age of Onset    High Cholesterol Mother     Diabetes Mother     High Cholesterol Father     Diabetes Father     Cancer Maternal Grandmother         colon    Cancer Paternal Grandmother         colon cancer     Social History     Social History    Marital status:      Spouse name: N/A    Number of children: N/A    Years of education: N/A     Occupational History    Not on file. Social History Main Topics    Smoking status: Never Smoker    Smokeless tobacco: Never Used    Alcohol use No    Drug use: No    Sexual activity: Not on file     Other Topics Concern    Not on file     Social History Narrative    ** Merged History Encounter **            Review of Systems:  {ROS Comprehensive:48477::\"A comprehensive review of systems was negative except for what was noted in the HPI. \"}     Physical Exam:   There were no vitals filed for this visit. There is no height or weight on file to calculate BMI. Constitutional: She is oriented to person, place, and time. She appears well-developed and well-nourished. No distress. HEENT:   Head: Normocephalic and atraumatic. Right Ear: Tympanic membrane, external ear and ear canal normal.   Left Ear: Tympanic membrane, external ear and ear canal normal.   Mouth/Throat: Oropharynx is clear and moist, and mucous membranes are normal.  There is no cervical adenopathy. Eyes: Conjunctivae and extraocular motions are normal. Pupils are equal, round, and reactive to light. Neck: Supple. No JVD present. Carotid bruit is not present. No mass and no thyromegaly present. Cardiovascular: Normal rate, regular rhythm, normal heart sounds and intact distal pulses. Exam reveals no gallop and no friction rub. No murmur heard. Pulmonary/Chest: Effort normal and breath sounds normal. No respiratory distress. She has no wheezes, rhonchi or rales. Abdominal: Soft, non-tender. Bowel sounds and aorta are normal. She exhibits no organomegaly, mass or bruit. Genitourinary: {Pelvic General Physical exam:91362::\"normal external genitalia, vulva, vagina, cervix, uterus and adnexa\"}.   Breast exam:  {pe breast exam:706380::\"breasts appear normal, no suspicious masses, no skin or nipple changes or axillary nodes\"}. Musculoskeletal: Normal range of motion, no synovitis. She exhibits no edema. Neurological: She is alert and oriented to person, place, and time. She has normal reflexes. No cranial nerve deficit. Coordination normal.   Skin: Skin is warm and dry. There is no rash or erythema. No suspicious lesions noted. Psychiatric: She has a normal mood and affect.  Her speech is normal and behavior is normal. Judgment, cognition and memory are normal.     Preventive Care:  Health Maintenance   Topic Date Due    Diabetes screen  2007    Shingles Vaccine (1 of 2 - 2 Dose Series) 2017    Flu vaccine (1) 2018    Colon cancer screen colonoscopy  2018    Breast cancer screen  2019    Lipid screen  2022    Cervical cancer screen  2022    DTaP/Tdap/Td vaccine (2 - Td) 2024    Pneumococcal med risk  Completed    HIV screen  Completed      Hx abnormal PAP: {NO/YES:332774258::\"no\"}  Sexual activity: {Persons; sexual partners:705}   Self-breast exams: {yes no:145923}  Last eye exam: ***,{NORMAL/ABNORMAL:914770202::\"normal\"}   Exercise: {EXERCISE VBRM:659901638}  Seatbelt use: ***       Preventive plan of care for Lord Ling        9/10/2018           Preventive Measures Status       Recommendations for screening   Colon Cancer Screen   Last colonoscopy: *** {Screenin:p}   Breast Cancer Screen  Last mammogram: ***  {Screenin:p}   Cervical Cancer Screen   Last PAP smear: *** {Screenin:p}   Osteoporosis Screen   Last DXA scan: *** {Screenin:p}   Diabetes Screen  Glucose (mg/dL)   Date Value   2018 104 (H)    {Screenin:p}   Cholesterol Screen  Lab Results   Component Value Date    CHOL 257 (H) 2017    TRIG 143 2017    HDL 50 2017    LDLCALC 178 (H) 2017    LDLDIRECT 144 (H) 2014    {Screenin:p}   Aspirin for Cardiovascular Prevention   {YES / MU:68902} {CARDIOVASCULAR PREVENTION, AIQSVYS:90309}   Weight: There is no height or weight on file to calculate BMI. {WEIGHT SCREENIN:p}   Living Will: {YES / EP:14372}   {LIVING WILL SCREENIN}    Recommended Immunizations    Immunization History   Administered Date(s) Administered    Hepatitis B (Recombivax HB) 2014, 2015, 2015    Influenza Virus Vaccine 2014, 2014    Influenza, Intradermal, Preservative free 2015    Influenza, Intradermal, Quadrivalent, Preservative Free 10/17/2017    Influenza, Quadv, 3 Years and older, IM (Fluzone 3 yrs and older or Afluria 5 yrs and older) 10/21/2016    Pneumococcal 13-valent Conjugate (Qauwuct61) 2017    Pneumococcal Polysaccharide (Spbjpmfou70) 2018    Tdap (Boostrix, Adacel) 2014        {RECOMMENDED VACCINES:40850:p}         Other Recommendations ·   {PLAN OF CARE OTHER RECOMMENDATIONS:73576:p}                 Assessment/Plan:    There are no diagnoses linked to this encounter.

## 2018-09-10 NOTE — PATIENT INSTRUCTIONS
from flu, and many more are hospitalized. Flu vaccine can:  · Keep you from getting flu. · Make flu less severe if you do get it. · Keep you from spreading flu to your family and other people. Inactivated and recombinant flu vaccines  A dose of flu vaccine is recommended every flu season. Children 6 months through 6years of age may need two doses during the same flu season. Everyone else needs only one dose each flu season. Some inactivated flu vaccines contain a very small amount of a mercury-based preservative called thimerosal. Studies have not shown thimerosal in vaccines to be harmful, but flu vaccines that do not contain thimerosal are available. There is no live flu virus in flu shots. They cannot cause the flu. There are many flu viruses, and they are always changing. Each year a new flu vaccine is made to protect against three or four viruses that are likely to cause disease in the upcoming flu season. But even when the vaccine doesn't exactly match these viruses, it may still provide some protection. Flu vaccine cannot prevent:  · Flu that is caused by a virus not covered by the vaccine. · Illnesses that look like flu but are not. Some people should not get this vaccine  Tell the person who is giving you the vaccine:  · If you have any severe (life-threatening) allergies. If you ever had a life-threatening allergic reaction after a dose of flu vaccine, or have a severe allergy to any part of this vaccine, you may be advised not to get vaccinated. Most, but not all, types of flu vaccine contain a small amount of egg protein. · If you ever had Guillain-Barré syndrome (also called GBS) Some people with a history of GBS should not get this vaccine. This should be discussed with your doctor. · If you are not feeling well. It is usually okay to get flu vaccine when you have a mild illness, but you might be asked to come back when you feel better.   Risks of a vaccine reaction  With any medicine, Prevention  Many Vaccine Information Statements are available in Hungarian and other languages. See www.immunize.org/vis. Muchas hojas de información sobre vacunas están disponibles en español y en otros idiomas. Visite www.immunize.org/vis. Care instructions adapted under license by [facility] ([facility]). If you have questions about a medical condition or this instruction, always ask your healthcare professional. [place] [address] any warranty or liability for your use of this information. Patient Education        Recombinant Zoster (Shingles) Vaccine, RZV: What you need to know  Why get vaccinated? Shingles (also called herpes zoster, or just zoster) is a painful skin rash, often with blisters. Shingles is caused by the varicella zoster virus, the same virus that causes chickenpox. After you have chickenpox, the virus stays in your body and can cause shingles later in life. You can't catch shingles from another person. However, a person who has never had chickenpox (or chickenpox vaccine) could get chickenpox from someone with shingles. A shingles rash usually appears on one side of the face or body and heals within 2 to 4 weeks. Its main symptom is pain, which can be severe. Other symptoms can include fever, headache, chills, and upset stomach. Very rarely, a shingles infection can lead to pneumonia, hearing problems, blindness, brain inflammation (encephalitis), or death. For about 1 person in 5, severe pain can continue even long after the rash has cleared up. This long-lasting pain is called post-herpetic neuralgia (PHN). Shingles is far more common in people 48years of age and older than in younger people, and the risk increases with age. It is also more common in people whose immune system is weakened because of a disease such as cancer, or by drugs such as steroids or chemotherapy. At least 1 million people a year in the [facility] get shingles.   Shingles vaccine (recombinant)  Recombinant shingles vaccine was approved by FDA in 2017 for the prevention of shingles. In clinical trials, it was more than 90% effective in preventing shingles. It can also reduce the likelihood of PHN. Two doses, 2 to 6 months apart, are recommended for adults 48 and older. This vaccine is also recommended for people who have already gotten the live shingles vaccine (Zostavax). There is no live virus in this vaccine. Some people should not get this vaccine  Tell your vaccine provider if you:  · Have any severe, life-threatening allergies. A person who has ever had a life-threatening allergic reaction after a dose of recombinant shingles vaccine, or has a severe allergy to any component of this vaccine, may be advised not to be vaccinated. Ask your health care provider if you want information about vaccine components. · Are pregnant or breastfeeding. There is not much information about use of recombinant shingles vaccine in pregnant or nursing women. Your healthcare provider might recommend delaying vaccination. · Are not feeling well. If you have a mild illness, such as a cold, you can probably get the vaccine today. If you are moderately or severely ill, you should probably wait until you recover. Your doctor can advise you. Risks of a vaccine reaction  With any medicine, including vaccines, there is a chance of reactions. After recombinant shingles vaccination, a person might experience:  · Pain, redness, soreness, or swelling at the site of the injection  · Headache, muscle aches, fever, shivering, fatigue  In clinical trials, most people got a sore arm with mild or moderate pain after vaccination, and some also had redness and swelling where they got the shot. Some people felt tired, had muscle pain, a headache, shivering, fever, stomach pain, or nausea. About 1 out of 6 people who got recombinant zoster vaccine experienced side effects that prevented them from doing regular activities. Symptoms went away on their own in about 2 to 3 days. Side effects were more common in younger people. You should still get the second dose of recombinant zoster vaccine even if you had one of these reactions after the first dose. Other things that could happen after this vaccine:  · People sometimes faint after medical procedures, including vaccination. Sitting or lying down for about 15 minutes can help prevent fainting and injuries caused by a fall. Tell your provider if you feel dizzy or have vision changes or ringing in the ears. · Some people get shoulder pain that can be more severe and longer-lasting than routine soreness that can follow injections. This happens very rarely. · Any medication can cause a severe allergic reaction. Such reactions to a vaccine are estimated at about 1 in a million doses, and would happen within a few minutes to a few hours after the vaccination. As with any medicine, there is a very remote chance of a vaccine causing a serious injury or death. The safety of vaccines is always being monitored. For more information, visit: www.cdc.gov/vaccinesafety/  What if there is a serious problem? What should I look for? · Look for anything that concerns you, such as signs of a severe allergic reaction, very high fever, or unusual behavior. Signs of a severe allergic reaction can include hives, swelling of the face and throat, difficulty breathing, a fast heartbeat, dizziness, and weakness. These would usually start a few minutes to a few hours after the vaccination. What should I do? · If you think it is a severe allergic reaction or other emergency that can't wait, call 9-1-1 or get to the nearest hospital. Otherwise, call your health care provider. · Afterward, the reaction should be reported to the Vaccine Adverse Event Reporting System (VAERS). Your doctor should file this report, or you can do it yourself through the VAERS website at www.vaers. hhs.gov, or by calling 7-449-565-1119. [employer] does not give medical advice. .  How can I learn more? · Ask your health care provider. He or she can give you the vaccine package insert or suggest other sources of information. · Call your local or state health department. · Contact the Centers for Disease Control and Prevention (CDC):  ¨ Call 3-287.847.4172 (1-800-CDC-INFO) or  ¨ Visit CDC's website at www.cdc.gov/vaccines  Vaccine Information Statement (Interim)  Recombinant Zoster Vaccine  2/12/2018  Counts include 234 beds at the [facility] and [facility] for Disease Control and Prevention  Many Vaccine Information Statements are available in Georgian and other languages. See www.immunize.org/vis. Hojas de Información Sobre Vacunas están disponibles en Español y en muchos otros idiomas. Visite Zaira.si   Care instructions adapted under license by [facility] ([facility]). If you have questions about a medical condition or this instruction, always ask your healthcare professional. [place] [address] any warranty or liability for your use of this information.

## 2018-09-10 NOTE — PROGRESS NOTES
SUBJECTIVE:    Patient ID: Kamila Ovalle is a 48 y.o. y.o. female. HPI  Chief Complaint   Patient presents with    Annual Exam     yearly physical exam        Review of Systems   Respiratory: Negative for chest tightness, shortness of breath and wheezing. Cardiovascular: Negative for chest pain, palpitations and leg swelling. Gastrointestinal: Negative for abdominal pain, constipation and nausea. Endocrine: Negative for cold intolerance and heat intolerance. Musculoskeletal: Negative for arthralgias and myalgias. Neurological: Negative for dizziness, light-headedness and headaches. OBJECTIVE:        Physical Exam    ASSESSMENT:  {No diagnosis found. (Refresh or delete this SmartLink)}      PLAN:  There are no diagnoses linked to this encounter.

## 2018-09-10 NOTE — PROGRESS NOTES
SUBJECTIVE:    Patient ID: Alireza Gonzales is a 48 y.o. y.o. female. HPI  History and Physical      Alireza Gonzales  YOB: 1965    Date of Service:  9/10/2018    Chief Complaint:   Alireza Gonzales is a 48 y.o. female who presents for complete physical examination. HPI: Annual exam  - no medical concerns noted  Scheduled appt for MRI and Follow up with Dr Wilver Ahuja Readings from Last 3 Encounters:   09/10/18 167 lb 12.8 oz (76.1 kg)   08/21/18 168 lb 9.6 oz (76.5 kg)   03/07/18 150 lb 12.8 oz (68.4 kg)     BP Readings from Last 3 Encounters:   09/10/18 110/70   08/21/18 110/68   03/07/18 118/78       Patient Active Problem List   Diagnosis    Hyperlipidemia    Anemia    Vitamin D deficiency    Hypertriglyceridemia    Asthma    Pituitary adenoma with extrasellar extension (HCC)       Allergies   Allergen Reactions    Contrast [Iodides]      Rash all over body, low grade fever, body aches. Went to ER next day for treatment.      Outpatient Prescriptions Marked as Taking for the 9/10/18 encounter (Office Visit) with SHON Madrigal CNP   Medication Sig Dispense Refill    acyclovir (ZOVIRAX) 200 MG capsule Take 200 mg by mouth 3 times daily      zoster recombinant adjuvanted vaccine (SHINGRIX) 50 MCG SUSR injection Repeat 2-6 months 0.5 mL 1    atorvastatin (LIPITOR) 10 MG tablet TAKE 1 TABLET BY MOUTH EVERY DAY 30 tablet 2    levothyroxine (SYNTHROID) 25 MCG tablet Take 25 mcg by mouth Daily      hydrocortisone (CORTEF) 10 MG tablet Take 10 mg by mouth 2 times daily      ferrous sulfate 325 (65 FE) MG tablet TAKE 1 TABLET BY MOUTH TWICE DAILY WITH MEALS (Patient taking differently: daily (with breakfast) TAKE 1 TABLET BY MOUTH TWICE DAILY WITH MEALS) 60 tablet 5       Past Medical History:   Diagnosis Date    Anemia     iron defieciency    Asthma     Asthma     Brain tumor ([facility] [address]     surgery for brain tumor 3/15/18    Hemorrhagic cyst of ovary 2007    Left    Hyperlipidemia     Tubular adenoma of colon 03/02/2018    COLONOSCOPY, DR CLUADINE KHAN, TUBULAR ADENOMAS ASCENDING COLON, 3 MM, 4 MM, 5 MM REMOVED. Past Surgical History:   Procedure Laterality Date    COLONOSCOPY  03/02/2018    COLONOSCOPY, DR CLAUDINE KHAN, 3 MM, 4 MM, 5 MM ASCENDING COLON POLYP REMOVED. LIMITED PREP. REPEAT 3-6 MONTHS.  EPIGASTRIC HERNIA REPAIR  10/30/2017    with mesh     HERNIA REPAIR      OTHER SURGICAL HISTORY      lump removed from under left arm pit     TUBAL LIGATION      UMBILICAL HERNIA REPAIR  12/06/2016    and umbilectomy     Family History   Problem Relation Age of Onset    High Cholesterol Mother     Diabetes Mother     High Cholesterol Father     Diabetes Father     Cancer Maternal Grandmother         colon    Cancer Paternal Grandmother         colon cancer     Social History     Social History    Marital status:      Spouse name: N/A    Number of children: N/A    Years of education: N/A     Occupational History    Not on file. Social History Main Topics    Smoking status: Never Smoker    Smokeless tobacco: Never Used    Alcohol use No    Drug use: No    Sexual activity: Not on file     Other Topics Concern    Not on file     Social History Narrative    ** Merged History Encounter **            Review of Systems:      Physical Exam:   Vitals:    09/10/18 0821   BP: 110/70   Site: Right Upper Arm   Position: Sitting   Cuff Size: Small Adult   Pulse: 68   Resp: 21   Temp: 98.5 °F (36.9 °C)   TempSrc: Oral   SpO2: 98%   Weight: 167 lb 12.8 oz (76.1 kg)   Height: 5' 8\" (1.727 m)     Body mass index is 25.51 kg/m². Constitutional: She is oriented to person, place, and time. She appears well-developed and well-nourished. No distress. HEENT:   Head: Normocephalic and atraumatic.    Right Ear: Tympanic membrane, external ear and ear canal normal.   Left Ear: Tympanic membrane, external ear and ear canal normal. Measures Status       Recommendations for screening   Colon Cancer Screen   Last colonoscopy: Mar 2018  This test is not clinically indicated   Breast Cancer Screen  Last mammogram: Sept 2017 Test is due Sept 2018   Cervical Cancer Screen   Last PAP smear: 2017 Repeat every 3 years   Osteoporosis Screen   Last DXA scan: Not indicated This test is not clinically indicated   Diabetes Screen  Glucose (mg/dL)   Date Value   03/07/2018 104 (H)    Test recommended and ordered   Cholesterol Screen  Lab Results   Component Value Date    CHOL 257 (H) 08/28/2017    TRIG 143 08/28/2017    HDL 50 08/28/2017    LDLCALC 178 (H) 08/28/2017    LDLDIRECT 144 (H) 03/14/2014    Test recommended and ordered   Aspirin for Cardiovascular Prevention   No Indicated but declined   Weight: Body mass index is 25.51 kg/m².   5' 8\" (1.727 m)167 lb 12.8 oz (76.1 kg)    Your BMI is 25 or greater, which indicates that you are overweight   Living Will: No   Additional information provided    Recommended Immunizations    Immunization History   Administered Date(s) Administered    Hepatitis B (Recombivax HB) 09/30/2014, 02/03/2015, 06/17/2015    Influenza Virus Vaccine 01/02/2014, 09/30/2014    Influenza, Intradermal, Preservative free 12/23/2015    Influenza, Intradermal, Quadrivalent, Preservative Free 10/17/2017    Influenza, Quadv, 3 Years and older, IM (Fluzone 3 yrs and older or Afluria 5 yrs and older) 10/21/2016    Pneumococcal 13-valent Conjugate (Bbdwynm30) 08/28/2017    Pneumococcal Polysaccharide ([id number]) 03/07/2018    Tdap (Boostrix, Adacel) 03/14/2014        Influenza vaccine:  administered today, risks and benefits discussed  Shingles vaccine:  prescription provided for patient to receive vaccine at pharmacy         Other Recommendations ·   See an eye specialist every 2 years  · See a dentist every 6 months  · Try to get at least 30 minutes of exercise 3-4 days per week  · Always wear a seat belt when traveling in a hyperlipidemia  LIPID PANEL   4. Diabetes mellitus screening  COMPREHENSIVE METABOLIC PANEL   5. Need for shingles vaccine  zoster recombinant adjuvanted vaccine [facility]) 50 MCG SUSR injection           PLAN:  Vargas Otero was seen today for annual exam.    Diagnoses and all orders for this visit:    Annual physical exam  Wellness Recommendation    · See an eye specialist every 2 years  · See a dentist every 6 months  · Try to get at least 30 minutes of exercise 3-4 days per week  · Always wear a seat belt when traveling in a car  · When exposed to the sun, use a sunscreen that protects against both UVA and UVB radiation with an SPF of 30 or greater- reapply every 2 to 3 hours or after sweating, drying off with a towel, or swimming  · You need 4710-9299 mg of calcium and 2761-0903 IU of vitamin D per day- it is possible to meet your calcium requirement with diet alone, but a vitamin D supplement is usually necessary  · Have your blood pressure checked at least once every year      Need for influenza vaccination  -     INFLUENZA, QUADV, 3 YRS AND OLDER, IM, MDV, 0.5ML (Rob Melvin)    Mixed hyperlipidemia  -     LIPID PANEL; Future    Diabetes mellitus screening  -     COMPREHENSIVE METABOLIC PANEL;  Future    Need for shingles vaccine  -     zoster recombinant adjuvanted vaccine (SHINGRIX) 50 MCG SUSR injection; Repeat 2-6 months

## 2018-10-03 ENCOUNTER — HOSPITAL ENCOUNTER (OUTPATIENT)
Dept: WOMENS IMAGING | Age: 53
Discharge: HOME | End: 2018-10-03
Payer: COMMERCIAL

## 2018-10-03 DIAGNOSIS — Z12.31 ENCOUNTER FOR SCREENING MAMMOGRAM FOR BREAST CANCER: ICD-10-CM

## 2018-10-03 PROCEDURE — 77067 SCR MAMMO BI INCL CAD: CPT

## 2018-10-09 DIAGNOSIS — E78.00 HYPERCHOLESTEROLEMIA: ICD-10-CM

## 2018-10-09 RX ORDER — ATORVASTATIN CALCIUM 10 MG/1
TABLET, FILM COATED ORAL
Qty: 30 TABLET | Refills: 2 | Status: SHIPPED | OUTPATIENT
Start: 2018-10-09 | End: 2019-01-18 | Stop reason: SDUPTHER

## 2018-10-10 DIAGNOSIS — N63.10 BREAST MASS, RIGHT: Primary | ICD-10-CM

## 2018-11-15 ENCOUNTER — HOSPITAL ENCOUNTER (OUTPATIENT)
Dept: ULTRASOUND IMAGING | Age: 53
Discharge: HOME | End: 2018-11-15
Payer: COMMERCIAL

## 2018-11-15 ENCOUNTER — HOSPITAL ENCOUNTER (OUTPATIENT)
Dept: WOMENS IMAGING | Age: 53
Discharge: HOME | End: 2018-11-15
Payer: COMMERCIAL

## 2018-11-15 DIAGNOSIS — N63.10 BREAST MASS, RIGHT: ICD-10-CM

## 2018-11-15 DIAGNOSIS — R92.2 DENSE BREAST: ICD-10-CM

## 2018-11-15 PROCEDURE — G0279 TOMOSYNTHESIS, MAMMO: HCPCS

## 2018-11-15 PROCEDURE — 76642 ULTRASOUND BREAST LIMITED: CPT

## 2018-12-31 ENCOUNTER — OFFICE VISIT (OUTPATIENT)
Dept: FAMILY MEDICINE CLINIC | Age: 53
End: 2018-12-31
Payer: COMMERCIAL

## 2018-12-31 VITALS
BODY MASS INDEX: 24.71 KG/M2 | HEIGHT: 68 IN | SYSTOLIC BLOOD PRESSURE: 98 MMHG | DIASTOLIC BLOOD PRESSURE: 60 MMHG | WEIGHT: 163 LBS | HEART RATE: 80 BPM | RESPIRATION RATE: 18 BRPM

## 2018-12-31 DIAGNOSIS — S76.109A INJURY OF QUADRICEPS MUSCLE: Primary | ICD-10-CM

## 2018-12-31 PROCEDURE — 99213 OFFICE O/P EST LOW 20 MIN: CPT | Performed by: NURSE PRACTITIONER

## 2018-12-31 RX ORDER — IBUPROFEN 800 MG/1
800 TABLET ORAL EVERY 8 HOURS PRN
Qty: 180 TABLET | Refills: 1 | Status: SHIPPED | OUTPATIENT
Start: 2018-12-31 | End: 2019-03-11

## 2018-12-31 NOTE — PATIENT INSTRUCTIONS
have questions about a medical condition or this instruction, always ask your healthcare professional. [place] [address] any warranty or liability for your use of this information.

## 2019-01-18 DIAGNOSIS — E78.00 HYPERCHOLESTEROLEMIA: ICD-10-CM

## 2019-01-18 RX ORDER — ATORVASTATIN CALCIUM 10 MG/1
TABLET, FILM COATED ORAL
Qty: 30 TABLET | Refills: 0 | Status: SHIPPED | OUTPATIENT
Start: 2019-01-18 | End: 2019-02-04 | Stop reason: SDUPTHER

## 2019-02-04 DIAGNOSIS — E78.00 HYPERCHOLESTEROLEMIA: ICD-10-CM

## 2019-02-04 RX ORDER — ATORVASTATIN CALCIUM 10 MG/1
TABLET, FILM COATED ORAL
Qty: 30 TABLET | Refills: 10 | Status: SHIPPED | OUTPATIENT
Start: 2019-02-04 | End: 2019-10-20 | Stop reason: SDUPTHER

## 2019-02-06 ENCOUNTER — PATIENT MESSAGE (OUTPATIENT)
Dept: FAMILY MEDICINE CLINIC | Age: 54
End: 2019-02-06

## 2019-02-07 RX ORDER — ACYCLOVIR 200 MG/1
200 CAPSULE ORAL 3 TIMES DAILY
Qty: 90 CAPSULE | Refills: 1 | Status: SHIPPED | OUTPATIENT
Start: 2019-02-07 | End: 2019-07-06 | Stop reason: SDUPTHER

## 2019-03-11 ENCOUNTER — OFFICE VISIT (OUTPATIENT)
Dept: FAMILY MEDICINE CLINIC | Age: 54
End: 2019-03-11
Payer: COMMERCIAL

## 2019-03-11 VITALS
SYSTOLIC BLOOD PRESSURE: 98 MMHG | TEMPERATURE: 98.5 F | OXYGEN SATURATION: 98 % | HEIGHT: 68 IN | WEIGHT: 168.8 LBS | HEART RATE: 80 BPM | DIASTOLIC BLOOD PRESSURE: 60 MMHG | BODY MASS INDEX: 25.58 KG/M2

## 2019-03-11 DIAGNOSIS — J06.9 VIRAL URI: Primary | ICD-10-CM

## 2019-03-11 DIAGNOSIS — R50.9 FEVER, UNSPECIFIED FEVER CAUSE: ICD-10-CM

## 2019-03-11 DIAGNOSIS — R52 BODY ACHES: ICD-10-CM

## 2019-03-11 LAB
INFLUENZA A ANTIBODY: NORMAL
INFLUENZA B ANTIBODY: NORMAL
S PYO AG THROAT QL: NORMAL

## 2019-03-11 PROCEDURE — 87880 STREP A ASSAY W/OPTIC: CPT | Performed by: NURSE PRACTITIONER

## 2019-03-11 PROCEDURE — 87804 INFLUENZA ASSAY W/OPTIC: CPT | Performed by: NURSE PRACTITIONER

## 2019-03-11 PROCEDURE — 99213 OFFICE O/P EST LOW 20 MIN: CPT | Performed by: NURSE PRACTITIONER

## 2019-03-11 ASSESSMENT — ENCOUNTER SYMPTOMS
SHORTNESS OF BREATH: 0
COUGH: 1
CHEST TIGHTNESS: 0
WHEEZING: 0

## 2019-03-11 ASSESSMENT — PATIENT HEALTH QUESTIONNAIRE - PHQ9
2. FEELING DOWN, DEPRESSED OR HOPELESS: 0
SUM OF ALL RESPONSES TO PHQ QUESTIONS 1-9: 0
1. LITTLE INTEREST OR PLEASURE IN DOING THINGS: 0
SUM OF ALL RESPONSES TO PHQ QUESTIONS 1-9: 0
SUM OF ALL RESPONSES TO PHQ9 QUESTIONS 1 & 2: 0

## 2019-03-13 ENCOUNTER — TELEPHONE (OUTPATIENT)
Dept: FAMILY MEDICINE CLINIC | Age: 54
End: 2019-03-13

## 2019-03-15 RX ORDER — AZITHROMYCIN 250 MG/1
250 TABLET, FILM COATED ORAL SEE ADMIN INSTRUCTIONS
Qty: 6 TABLET | Refills: 0 | Status: SHIPPED | OUTPATIENT
Start: 2019-03-15 | End: 2019-03-20

## 2019-04-01 ENCOUNTER — TELEPHONE (OUTPATIENT)
Dept: FAMILY MEDICINE CLINIC | Age: 54
End: 2019-04-01

## 2019-04-02 DIAGNOSIS — M79.605 LEFT LEG PAIN: Primary | ICD-10-CM

## 2019-04-16 ENCOUNTER — HOSPITAL ENCOUNTER (OUTPATIENT)
Dept: PHYSICAL THERAPY | Age: 54
Setting detail: THERAPIES SERIES
Discharge: HOME | End: 2019-04-16
Payer: COMMERCIAL

## 2019-04-16 NOTE — FLOWSHEET NOTE
Physical Therapy  Cancellation/No-show Note  Patient Name:  Tess Bergman  :  1965   Date:  2019  Cancelled visits to date: 1  No-shows to date: 0    For today's appointment patient:  [x]  Cancelled  19 Initial PT evaluation just prior to scheduled time.    []  Rescheduled appointment  []  No-show     Reason given by patient:  []  Patient ill  []  Conflicting appointment  []  No transportation    []  Conflict with work  []  No reason given  []  Other:     Comments:      Electronically signed by:  Michelle Han PT

## 2019-04-25 ENCOUNTER — HOSPITAL ENCOUNTER (OUTPATIENT)
Dept: PHYSICAL THERAPY | Age: 54
Setting detail: THERAPIES SERIES
Discharge: HOME | End: 2019-04-25
Payer: COMMERCIAL

## 2019-04-25 PROCEDURE — 97110 THERAPEUTIC EXERCISES: CPT

## 2019-04-25 PROCEDURE — 97162 PT EVAL MOD COMPLEX 30 MIN: CPT

## 2019-04-25 PROCEDURE — 97140 MANUAL THERAPY 1/> REGIONS: CPT
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
## 2019-05-02 ENCOUNTER — HOSPITAL ENCOUNTER (OUTPATIENT)
Dept: PHYSICAL THERAPY | Age: 54
Setting detail: THERAPIES SERIES
Discharge: HOME | End: 2019-05-02
Payer: COMMERCIAL

## 2019-05-02 PROCEDURE — 97140 MANUAL THERAPY 1/> REGIONS: CPT

## 2019-05-02 PROCEDURE — 97110 THERAPEUTIC EXERCISES: CPT
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
## 2019-05-07 ENCOUNTER — APPOINTMENT (OUTPATIENT)
Dept: PHYSICAL THERAPY | Age: 54
End: 2019-05-07
Payer: COMMERCIAL

## 2019-05-09 ENCOUNTER — HOSPITAL ENCOUNTER (OUTPATIENT)
Dept: PHYSICAL THERAPY | Age: 54
Setting detail: THERAPIES SERIES
Discharge: HOME | End: 2019-05-09
Payer: COMMERCIAL

## 2019-05-09 PROCEDURE — 97110 THERAPEUTIC EXERCISES: CPT

## 2019-05-09 PROCEDURE — 97140 MANUAL THERAPY 1/> REGIONS: CPT
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
## 2019-05-14 ENCOUNTER — APPOINTMENT (OUTPATIENT)
Dept: PHYSICAL THERAPY | Age: 54
End: 2019-05-14
Payer: COMMERCIAL

## 2019-05-16 ENCOUNTER — HOSPITAL ENCOUNTER (OUTPATIENT)
Dept: PHYSICAL THERAPY | Age: 54
Setting detail: THERAPIES SERIES
Discharge: HOME | End: 2019-05-16
Payer: COMMERCIAL

## 2019-05-16 PROCEDURE — 97140 MANUAL THERAPY 1/> REGIONS: CPT

## 2019-05-16 PROCEDURE — 97110 THERAPEUTIC EXERCISES: CPT
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
## 2019-05-21 ENCOUNTER — HOSPITAL ENCOUNTER (OUTPATIENT)
Dept: PHYSICAL THERAPY | Age: 54
Setting detail: THERAPIES SERIES
Discharge: HOME | End: 2019-05-21
Payer: COMMERCIAL

## 2019-05-21 PROCEDURE — 97140 MANUAL THERAPY 1/> REGIONS: CPT

## 2019-05-21 PROCEDURE — 97110 THERAPEUTIC EXERCISES: CPT
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
## 2019-05-23 ENCOUNTER — HOSPITAL ENCOUNTER (OUTPATIENT)
Dept: PHYSICAL THERAPY | Age: 54
Setting detail: THERAPIES SERIES
Discharge: HOME | End: 2019-05-23
Payer: COMMERCIAL

## 2019-05-28 ENCOUNTER — HOSPITAL ENCOUNTER (OUTPATIENT)
Dept: PHYSICAL THERAPY | Age: 54
Setting detail: THERAPIES SERIES
Discharge: HOME | End: 2019-05-28
Payer: COMMERCIAL

## 2019-05-28 PROCEDURE — 97110 THERAPEUTIC EXERCISES: CPT

## 2019-05-28 PROCEDURE — 97140 MANUAL THERAPY 1/> REGIONS: CPT
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
## 2019-05-30 ENCOUNTER — HOSPITAL ENCOUNTER (OUTPATIENT)
Dept: PHYSICAL THERAPY | Age: 54
Setting detail: THERAPIES SERIES
Discharge: HOME | End: 2019-05-30
Payer: COMMERCIAL

## 2019-05-30 PROCEDURE — 97110 THERAPEUTIC EXERCISES: CPT

## 2019-05-30 PROCEDURE — 97140 MANUAL THERAPY 1/> REGIONS: CPT
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
## 2019-06-17 NOTE — DISCHARGE SUMMARY
Physical Therapy Discharge Note  Date: 2019    Patient Name: Stacey Aguayo  : 1965  MRN: [id number]    Pertinent Medical History:Umbilical  hernia repair x 2, brain tumor removed  Medical/Treatment Diagnosis Information:  · Diagnosis: Left leg pain (M79.605 ICD-10-CM)  · Treatment Diagnosis: Gait and mobility dysfunction due to right hip weakness and myofascial stiffness  Insurance/Certification information:  PT Insurance Information: BCBS (57 pcy)  Physician Information:  Referring Practitioner: Zohra Chris        Dates of Service:19 to 19  Total # of Visits:7  Cancel/No Shows to date:0    Medicare Cap Total for 2019:    G-code (if applicable):    Date G-code Applied:       Treatment to Date:  [x] Therapeutic Exercise  [x] Modalities:  [x] Therapeutic Activity     [] Ultrasound  [] Electrical Stim   [] Gait Training      [] Cervical Traction    [] Lumbar Traction  [x] Neuromuscular Re-education  [x] Cold/hotpack [] Iontophoresis  [x] Instruction in HEP      Other:  [x] Manual Therapy       []    [] Aquatic Therapy       []                    ? Significant Findings At Last Visit:    Subjective:19 Patient reports she is feeling better able to go up and down the steps easier and having less stiffness when walking. 19 Pt reports while she continues to feel some inner thigh stiffness her pain has decreased and she is walking be=tter than prior to pT.                Objective:  Observation:decreased tenderness at right adductors, pelvis in good alignment  Test measurements:         Goal Status:  [] Achieved [x] Partially Achieved  [] Not Achieved     Reason for Discharge:  [] Goals Met   [] Patient did not return after initial evaluation   [] Progress Plateaued [] Missed _____ scheduled appointments   [] No insurance coverage [x] Patient terminated therapy   [] Other:        PT recommendation:   [x] Patient now discharged with HEP      [] Other:    Discharge

## 2019-07-08 RX ORDER — ACYCLOVIR 200 MG/1
200 CAPSULE ORAL 3 TIMES DAILY
Qty: 90 CAPSULE | Refills: 0 | Status: SHIPPED | OUTPATIENT
Start: 2019-07-08 | End: 2019-08-14 | Stop reason: SDUPTHER

## 2019-08-15 RX ORDER — ACYCLOVIR 200 MG/1
CAPSULE ORAL
Qty: 90 CAPSULE | Refills: 0 | Status: SHIPPED | OUTPATIENT
Start: 2019-08-15 | End: 2019-09-19 | Stop reason: SDUPTHER

## 2019-08-23 ENCOUNTER — OFFICE VISIT (OUTPATIENT)
Dept: FAMILY MEDICINE CLINIC | Age: 54
End: 2019-08-23
Payer: COMMERCIAL

## 2019-08-23 VITALS
BODY MASS INDEX: 23.61 KG/M2 | WEIGHT: 155.8 LBS | DIASTOLIC BLOOD PRESSURE: 66 MMHG | HEIGHT: 68 IN | HEART RATE: 74 BPM | SYSTOLIC BLOOD PRESSURE: 124 MMHG | RESPIRATION RATE: 16 BRPM | OXYGEN SATURATION: 99 %

## 2019-08-23 DIAGNOSIS — K52.9 ACUTE GASTROENTERITIS: Primary | ICD-10-CM

## 2019-08-23 PROCEDURE — 99213 OFFICE O/P EST LOW 20 MIN: CPT | Performed by: NURSE PRACTITIONER

## 2019-08-23 RX ORDER — PROMETHAZINE HYDROCHLORIDE 12.5 MG/1
12.5 TABLET ORAL EVERY 6 HOURS PRN
Qty: 28 TABLET | Refills: 0 | Status: SHIPPED | OUTPATIENT
Start: 2019-08-23 | End: 2019-08-30

## 2019-08-23 ASSESSMENT — ENCOUNTER SYMPTOMS
NAUSEA: 1
DIARRHEA: 1
VOMITING: 1

## 2019-08-23 NOTE — LETTER
[address] [address]  Phone: [phone]  Fax: 428 [facility], [address] [place] [address], APRN - CNP        August 23, 2019     Patient: Tawny Degroot   YOB: 1965   Date of Visit: 8/23/2019       To Whom it May Concern:    Tawny Degroot was seen in my clinic on 8/23/2019. She may return to work on 8/26/19. If you have any questions or concerns, please don't hesitate to call.     Sincerely,         Carmela Kong, APRN - CNP

## 2019-08-23 NOTE — PATIENT INSTRUCTIONS
questions about a medical condition or this instruction, always ask your healthcare professional. [place] [id number] any warranty or liability for your use of this information.

## 2019-09-19 RX ORDER — ACYCLOVIR 200 MG/1
CAPSULE ORAL
Qty: 90 CAPSULE | Refills: 0 | Status: SHIPPED | OUTPATIENT
Start: 2019-09-19 | End: 2019-10-19 | Stop reason: SDUPTHER

## 2019-10-20 DIAGNOSIS — E78.00 HYPERCHOLESTEROLEMIA: ICD-10-CM

## 2019-10-21 RX ORDER — ACYCLOVIR 200 MG/1
CAPSULE ORAL
Qty: 90 CAPSULE | Refills: 0 | Status: SHIPPED | OUTPATIENT
Start: 2019-10-21 | End: 2019-12-30

## 2019-10-21 RX ORDER — ATORVASTATIN CALCIUM 10 MG/1
TABLET, FILM COATED ORAL
Qty: 30 TABLET | Refills: 3 | Status: SHIPPED | OUTPATIENT
Start: 2019-10-21 | End: 2020-03-02 | Stop reason: SDUPTHER

## 2019-12-30 RX ORDER — ACYCLOVIR 200 MG/1
CAPSULE ORAL
Qty: 90 CAPSULE | Refills: 0 | Status: SHIPPED | OUTPATIENT
Start: 2019-12-30 | End: 2020-05-04

## 2020-01-14 ENCOUNTER — OFFICE VISIT (OUTPATIENT)
Dept: FAMILY MEDICINE CLINIC | Age: 55
End: 2020-01-14
Payer: COMMERCIAL

## 2020-01-14 VITALS
DIASTOLIC BLOOD PRESSURE: 70 MMHG | BODY MASS INDEX: 23.64 KG/M2 | RESPIRATION RATE: 16 BRPM | WEIGHT: 156 LBS | HEART RATE: 58 BPM | OXYGEN SATURATION: 99 % | TEMPERATURE: 98.6 F | HEIGHT: 68 IN | SYSTOLIC BLOOD PRESSURE: 110 MMHG

## 2020-01-14 PROCEDURE — 99213 OFFICE O/P EST LOW 20 MIN: CPT | Performed by: REGISTERED NURSE

## 2020-01-14 RX ORDER — ALBUTEROL SULFATE 90 UG/1
2 AEROSOL, METERED RESPIRATORY (INHALATION) EVERY 4 HOURS PRN
Qty: 1 INHALER | Refills: 2 | Status: SHIPPED | OUTPATIENT
Start: 2020-01-14

## 2020-01-14 RX ORDER — METHYLPREDNISOLONE 4 MG/1
4 TABLET ORAL SEE ADMIN INSTRUCTIONS
Qty: 1 KIT | Refills: 0 | Status: SHIPPED | OUTPATIENT
Start: 2020-01-14 | End: 2020-01-20

## 2020-01-14 ASSESSMENT — ENCOUNTER SYMPTOMS
COUGH: 1
SINUS PRESSURE: 1
SINUS PAIN: 1
WHEEZING: 1
SORE THROAT: 0
CHEST TIGHTNESS: 0
TROUBLE SWALLOWING: 0
RHINORRHEA: 1
SHORTNESS OF BREATH: 1

## 2020-01-14 ASSESSMENT — PATIENT HEALTH QUESTIONNAIRE - PHQ9
SUM OF ALL RESPONSES TO PHQ QUESTIONS 1-9: 0
SUM OF ALL RESPONSES TO PHQ QUESTIONS 1-9: 0
2. FEELING DOWN, DEPRESSED OR HOPELESS: 0
SUM OF ALL RESPONSES TO PHQ9 QUESTIONS 1 & 2: 0
1. LITTLE INTEREST OR PLEASURE IN DOING THINGS: 0

## 2020-01-14 NOTE — PROGRESS NOTES
[facility] Medicine  Clinic Note    Date: 1/14/2020                                               Subjective/Objective:     Chief Complaint   Patient presents with    Cough     PT C/O COUGH WITH NO PHLEGM, NASAL DR CLEAR, HA, SOB, WHEEZING AND NO TEMP X3DAYS. PT HAS HX OF ASTHMA. PT HAS TRIED HALLS         HPI  Patient present with complaints of possible asthma exacerbation for 3 days. Symptoms include nasal congestion, rhinorrhea, facial pain, cough, dyspnea, wheezing, and headache. Denies fever, chills, night sweats, sore throat, swollen glands, chest pain, hemoptysis. Has not attempted to treat. Has a history of asthma. Does not use inhalers. Patient Active Problem List    Diagnosis Date Noted    Pituitary adenoma with extrasellar extension ([facility] [address] 03/15/2018    Asthma 03/18/2015    Vitamin D deficiency 03/17/2014    Hypertriglyceridemia 03/17/2014    Hyperlipidemia 03/14/2014    Anemia 03/14/2014       Past Medical History:   Diagnosis Date    Anemia     iron defieciency    Asthma     Brain tumor ([facility] [address]     surgery for brain tumor 3/15/18    Hemorrhagic cyst of ovary 2007    Left    Hyperlipidemia     Tubular adenoma of colon 03/02/2018    COLONOSCOPY, DR CLAUDINE KHAN, TUBULAR ADENOMAS ASCENDING COLON, 3 MM, 4 MM, 5 MM REMOVED. Past Surgical History:   Procedure Laterality Date    COLONOSCOPY  03/02/2018    COLONOSCOPY, DR CLAUDINE KHAN, 3 MM, 4 MM, 5 MM ASCENDING COLON POLYP REMOVED. LIMITED PREP. REPEAT 3-6 MONTHS.     EPIGASTRIC HERNIA REPAIR  10/30/2017    with mesh     HERNIA REPAIR      OTHER SURGICAL HISTORY      lump removed from under left arm pit     TUBAL LIGATION      UMBILICAL HERNIA REPAIR  12/06/2016    and umbilectomy       Office Visit on 03/11/2019   Component Date Value Ref Range Status    Strep A Ag 03/11/2019 None Detected  None Detected Final    Influenza A Ab 03/11/2019 NEG   Final    Influenza B Ab 03/11/2019 NEG   Final       Family History HFA) 108 (90 Base) MCG/ACT inhaler; Inhale 2 puffs into the lungs every 4 hours as needed for Wheezing  Dispense: 1 Inhaler; Refill: 2      No orders of the defined types were placed in this encounter. Return if symptoms worsen or fail to improve.     Marquez Reyes NP    1/14/2020  8:28 AM

## 2020-01-14 NOTE — PATIENT INSTRUCTIONS
Mucinex, Flonase, Delsym and Zyrtec  For the first 7-14 days of symptoms follow instructions below, even before being seen in the office or even during treatment with antibiotics, until symptom free. 1. Water: Drink 1 ounce of water for every 2 pounds of body weight for adults, 90 Ounces of water per day. This will loosen mucus in the head and chest & improve the weak feeling of dehydration, allow the body to get germ fighting resources to the infection. Half can be juice or sugar free Crystal Light. Don't count drinks with caffeine or carbonation. Infants can have Pedialyte liquid or freezer pops. Avoid salt if you have high Blood Pressure, swelling in the feet or ankles or have heart problems. 2. Humidity: Humidify the air to 35-50% ( or until the windows fog over slightly).  Summer use of an air conditioner turned down too far and can result in dry air. Can use a humidifier, vaporizer, boil water on the stove or put a coffee can full of water on the heater vents. This will loosen mucus from infections and allergies. 3. Sleep: Get 8-10 hours a night and rest during the evening after work or school. If you have trouble sleeping, adults can take Melatonin 5mg up to 2 tabs at bedtime ( not for children or pregnant women). If Mono is suspected then sleep during 9PM to 9AM time span (if possible.)   4. Cough: Take cough medicines with Guaifenesin ( to loosen chest or head congestion) and Dextromethorphan ( to decrease excess cough). Robitussin D.M. Syrup every 4-6 hrs or Mucinex D. M. pills twice a day. Use the pediatric formulations for children over 6 months making sure they are alcohol & sugar free for children, pregnant women, and diabetics. 5. Pain And Fevers: Take Acetaminophen ( Tylenol) for fevers, aches, and headaches. 2-500 mg every 8 hours for adults. Appropriate doses at bedtime for children may help them sleep better. If pregnant take 1 -500 mg (Tylenol) every 8 hours as needed.  Ibuprofen may be used if not pregnant, but should be given with food to avoid nausea. Avoid Ibuprofen if you have high blood pressure, CHF, or kidney problems. 6.Gargle: (DAY ONE OF SYMPTOMS) Gargle in the back of the throat with the head tilted back and to the sides with a strong mouthwash  ( Listerine or Scope) after meals and at bedtime at least 4 -5 times a day. This helps kill bacteria and viruses in the back of the throat and will shorten the duration and decrease the severity of your symptoms: sore throat, cough, ear popping,/ear pain, and possibly dizziness. 7. Smoking: Avoid smoking or exposure to second hand smoke. 8. Zinc: (DAY ONE OF SYMPTOMS)  Zinc lozenges such as Cold Boris (available most stores), or Basic ([employer] brand) will help shorten the duration and lessen symptoms such as sore throat, cough, nasal congestion, runny nose, and post nasal drip. Use 1 lozenge every 2-4 hours ( after meals if stomach is sensitive). Children can use 10-15 mg or less 3-4 times a day or Zinc lollypops. In pregnancy limit to 50-60 mg a day for 7 days as prenatals have Zinc also.    With diarrhea use zinc pills 50 mg 1/2 to 1 pill 2x/day. 9. Vitamins: Vitamin C 500 mg with breakfast and dinner. Children and pregnant women should drink citrus juices. This speeds healing and strengthens immune system. 10. Chest Symptoms: Vicks Vapor rub to the chest at bedtime. 11. Decongestants: Avoid all decongestants if you have high blood pressure. Safe to take if you do not have high blood pressure. Try all of the above starting with day 1 of symptoms. If Strep throat symptoms appear call to be seen in the office as soon as possible and don't gargle on that day. Newborns, infants, or anyone with earaches or influenza may need to be seen quickly. Adults with fevers over 103 degrees or shortness of breath should call the office immediately.       Patient Education        Asthma: Your Action Plan  Sample Action Plan    Controller medicine returned to the green zone in 1 hour, then:  · [ ] Take _____ puff(s) of medicine called ______________________. Take it ____ times a day. · [ ] Begin or increase treatment with corticosteroid pills. Take ______ mg of medicine called ____________________________ every __________. · [ ] Call your doctor at this number: ____________________. RED ZONE: Danger! Red zone symptoms  · You are very short of breath. · You can't do your usual activities. · Quick-relief medicine doesn't help. Or your symptoms don't get better after 24 hours in the yellow zone. Red zone peak flow (if you use a peak flow meter)  · Less than _______ (less than 50% of your personal best)  Red zone actions (Check the boxes and fill in the blank spaces that apply.)  [ ] Take _____ puff(s) of quick-relief medicine called ____________________________. Repeat ______ times. [ ] Begin or increase treatment with corticosteroid pills. Take ________ mg now. [ ] Call your doctor at this number: _________________. If you can't contact your doctor, go to the emergency department. Call 911 or ___________________. [ ] Other numbers you might call are: ___________________________________. When should you call for help? Call 911 anytime you think you may need emergency care. For example, call if:  · You have severe trouble breathing. Call your doctor now or seek immediate medical care if:  · You are in the red zone of your asthma action plan. · You've used your quick-relief medicine but are still having trouble breathing. · You cough up blood. · You have new or worse trouble breathing. · You cough up dark brown or bloody mucus (sputum). Watch closely for changes in your health, and be sure to contact your doctor if:  · You need to use quick-relief medicine more than 2 days each week (unless it's just for exercise). · Your coughing and wheezing get worse. Follow-up care is a key part of your treatment and safety.  Be sure to make and go to all appointments, and call your doctor if you are having problems. It's also a good idea to know your test results and keep a list of the medicines you take. Where can you learn more? Go to https://[employer]. org and sign in to your [employer] account. Enter 32 [phone] in the [facility] box to learn more about \"Asthma: Your Action Plan. \"     If you do not have an account, please click on the \"Sign Up Now\" link. Current as of: June 9, 2019  Content Version: 12.3  © 5775-5667 [employer]. Care instructions adapted under license by [facility] ([facility]). If you have questions about a medical condition or this instruction, always ask your healthcare professional. [address] any warranty or liability for your use of this information. Patient Education        Asthma in Adults: Care Instructions  Your Care Instructions    During an asthma attack, your airways swell and narrow as a reaction to certain things (triggers). This makes it hard to breathe. You may be able to prevent asthma attacks if you avoid the things that set off your asthma symptoms. Keeping your asthma under control and treating symptoms before they get bad can help you avoid severe attacks. If you can control your asthma, you may be able to do all of your normal daily activities. You may also avoid asthma attacks and trips to the hospital.  Follow-up care is a key part of your treatment and safety. Be sure to make and go to all appointments, and call your doctor if you are having problems. It's also a good idea to know your test results and keep a list of the medicines you take. How can you care for yourself at home? · Follow your asthma action plan so you can manage your symptoms at home. An asthma action plan will help you prevent and control airway reactions and will tell you what to do during an asthma attack.  If you do not have an asthma action plan, work with your doctor to build one.  · Take your asthma medicine exactly as prescribed. Medicine plays an important role in controlling asthma. Talk to your doctor right away if you have any questions about what to take and how to take it. ? Use your quick-relief medicine when you have symptoms of an attack. Quick-relief medicine often is an albuterol inhaler. Some people need to use quick-relief medicine before they exercise. ? Take your controller medicine every day, not just when you have symptoms. Controller medicine is usually an inhaled corticosteroid. The goal is to prevent problems before they occur. Do not use your controller medicine to try to treat an attack that has already started. It does not work fast enough to help. ? If your doctor prescribed corticosteroid pills to use during an attack, take them as directed. They may take hours to work, but they may shorten the attack and help you breathe better. ? Keep your quick-relief medicine with you at all times. · Talk to your doctor before using other medicines. Some medicines, such as aspirin, can cause asthma attacks in some people. · Check yourself for asthma symptoms to know which step to follow in your action plan. Watch for things like being short of breath, having chest tightness, coughing, and wheezing. Also notice if symptoms wake you up at night or if you get tired quickly when you exercise. · If you have a peak flow meter, use it to check how well you are breathing. This can help you predict when an asthma attack is going to occur. Then you can take medicine to prevent the asthma attack or make it less severe. · See your doctor regularly. These visits will help you learn more about asthma and what you can do to control it. Your doctor will monitor your treatment to make sure the medicine is helping you. · Keep track of your asthma attacks and your treatment.  After you have had an attack, write down what triggered it, what helped end it, and any concerns you have

## 2020-02-28 ENCOUNTER — HOSPITAL ENCOUNTER (OUTPATIENT)
Dept: WOMENS IMAGING | Age: 55
Discharge: HOME | End: 2020-02-28
Payer: COMMERCIAL

## 2020-02-28 ENCOUNTER — OFFICE VISIT (OUTPATIENT)
Dept: FAMILY MEDICINE CLINIC | Age: 55
End: 2020-02-28
Payer: COMMERCIAL

## 2020-02-28 ENCOUNTER — HOSPITAL ENCOUNTER (OUTPATIENT)
Dept: MRI IMAGING | Age: 55
End: 2020-02-28
Payer: COMMERCIAL

## 2020-02-28 VITALS
HEIGHT: 68 IN | TEMPERATURE: 98.6 F | RESPIRATION RATE: 14 BRPM | HEART RATE: 70 BPM | WEIGHT: 152.4 LBS | BODY MASS INDEX: 23.1 KG/M2 | SYSTOLIC BLOOD PRESSURE: 120 MMHG | DIASTOLIC BLOOD PRESSURE: 76 MMHG

## 2020-02-28 LAB
A/G RATIO: 1.6 (ref 1.1–2.2)
ALBUMIN SERPL-MCNC: 4.8 G/DL (ref 3.4–5)
ALP BLD-CCNC: 74 U/L (ref 40–129)
ALT SERPL-CCNC: 15 U/L (ref 10–40)
ANION GAP SERPL CALCULATED.3IONS-SCNC: 13 MMOL/L (ref 3–16)
AST SERPL-CCNC: 18 U/L (ref 15–37)
BILIRUB SERPL-MCNC: 0.3 MG/DL (ref 0–1)
BUN BLDV-MCNC: 11 MG/DL (ref 7–20)
CALCIUM SERPL-MCNC: 10 MG/DL (ref 8.3–10.6)
CHLORIDE BLD-SCNC: 103 MMOL/L (ref 99–110)
CHOLESTEROL, TOTAL: 179 MG/DL (ref 0–199)
CO2: 24 MMOL/L (ref 21–32)
CREAT SERPL-MCNC: 0.7 MG/DL (ref 0.6–1.1)
GFR AFRICAN AMERICAN: >60
GFR NON-AFRICAN AMERICAN: >60
GLOBULIN: 3 G/DL
GLUCOSE BLD-MCNC: 92 MG/DL (ref 70–99)
HDLC SERPL-MCNC: 45 MG/DL (ref 40–60)
LDL CHOLESTEROL CALCULATED: 109 MG/DL
POTASSIUM SERPL-SCNC: 3.6 MMOL/L (ref 3.5–5.1)
SODIUM BLD-SCNC: 140 MMOL/L (ref 136–145)
TOTAL PROTEIN: 7.8 G/DL (ref 6.4–8.2)
TRIGL SERPL-MCNC: 123 MG/DL (ref 0–150)
TSH REFLEX: 1.03 UIU/ML (ref 0.27–4.2)
VLDLC SERPL CALC-MCNC: 25 MG/DL

## 2020-02-28 PROCEDURE — 36415 COLL VENOUS BLD VENIPUNCTURE: CPT | Performed by: NURSE PRACTITIONER

## 2020-02-28 PROCEDURE — 99396 PREV VISIT EST AGE 40-64: CPT | Performed by: NURSE PRACTITIONER

## 2020-02-28 PROCEDURE — G0279 TOMOSYNTHESIS, MAMMO: HCPCS

## 2020-02-28 ASSESSMENT — PATIENT HEALTH QUESTIONNAIRE - PHQ9
1. LITTLE INTEREST OR PLEASURE IN DOING THINGS: 0
SUM OF ALL RESPONSES TO PHQ QUESTIONS 1-9: 0
SUM OF ALL RESPONSES TO PHQ9 QUESTIONS 1 & 2: 0
2. FEELING DOWN, DEPRESSED OR HOPELESS: 0
SUM OF ALL RESPONSES TO PHQ QUESTIONS 1-9: 0

## 2020-02-28 NOTE — PROGRESS NOTES
10/30/2017    with mesh     HERNIA REPAIR      OTHER SURGICAL HISTORY      lump removed from under left arm pit     TUBAL LIGATION      UMBILICAL HERNIA REPAIR  12/06/2016    and umbilectomy     Family History   Problem Relation Age of Onset    High Cholesterol Mother     Diabetes Mother     High Cholesterol Father     Diabetes Father     Cancer Maternal Grandmother         colon    Cancer Paternal Grandmother         colon cancer     Social History     Socioeconomic History    Marital status:      Spouse name: Not on file    Number of children: Not on file    Years of education: Not on file    Highest education level: Not on file   Occupational History    Not on file   Social Needs    Financial resource strain: Not on file    Food insecurity:     Worry: Not on file     Inability: Not on file    Transportation needs:     Medical: Not on file     Non-medical: Not on file   Tobacco Use    Smoking status: Never Smoker    Smokeless tobacco: Never Used   Substance and Sexual Activity    Alcohol use: No    Drug use: No    Sexual activity: Not on file   Lifestyle    Physical activity:     Days per week: Not on file     Minutes per session: Not on file    Stress: Not on file   Relationships    Social connections:     Talks on phone: Not on file     Gets together: Not on file     Attends Sikhism service: Not on file     Active member of club or organization: Not on file     Attends meetings of clubs or organizations: Not on file     Relationship status: Not on file    Intimate partner violence:     Fear of current or ex partner: Not on file     Emotionally abused: Not on file     Physically abused: Not on file     Forced sexual activity: Not on file   Other Topics Concern    Not on file   Social History Narrative    ** Merged History Encounter **            Review of Systems:  A comprehensive review of systems was negative.       Physical Exam:   Vitals:    02/28/20 1243   BP: 120/76 Site: Right Upper Arm   Position: Sitting   Cuff Size: Small Adult   Pulse: 70   Resp: 14   Temp: 98.6 °F (37 °C)   TempSrc: Oral   Weight: 152 lb 6.4 oz (69.1 kg)   Height: 5' 8\" (1.727 m)     Body mass index is 23.17 kg/m². Constitutional: She is oriented to person, place, and time. She appears well-developed and well-nourished. No distress. HEENT:   Head: Normocephalic and atraumatic. Right Ear: Tympanic membrane, external ear and ear canal normal.   Left Ear: Tympanic membrane, external ear and ear canal normal.   Mouth/Throat: Oropharynx is clear and moist, and mucous membranes are normal.  There is no cervical adenopathy. Eyes: Conjunctivae and extraocular motions are normal. Pupils are equal, round, and reactive to light. Neck: Supple. No JVD present. Carotid bruit is not present. No mass and no thyromegaly present. Cardiovascular: Normal rate, regular rhythm, normal heart sounds and intact distal pulses. Exam reveals no gallop and no friction rub. No murmur heard. Pulmonary/Chest: Effort normal and breath sounds normal. No respiratory distress. She has no wheezes, rhonchi or rales. Abdominal: Soft, non-tender. Bowel sounds and aorta are normal. She exhibits no organomegaly, mass or bruit. Musculoskeletal: Normal range of motion, no synovitis. She exhibits no edema. Neurological: She is alert and oriented to person, place, and time. She has normal reflexes. No cranial nerve deficit. Coordination normal.   Skin: Skin is warm and dry. There is no rash or erythema. No suspicious lesions noted. Psychiatric: She has a normal mood and affect.  Her speech is normal and behavior is normal. Judgment, cognition and memory are normal.     Preventive Care:  Health Maintenance   Topic Date Due    Colon cancer screen colonoscopy  09/14/2018    Flu vaccine (1) 09/01/2019    Lipid screen  09/10/2019    Breast cancer screen  11/15/2020    Cervical cancer screen  08/28/2022    DTaP/Tdap/Td vaccine (2 - Td) 03/14/2024    Shingles Vaccine  Completed    Pneumococcal 0-64 years Vaccine  Completed    HIV screen  Completed    Hepatitis A vaccine  Aged Out    Hepatitis B vaccine  Aged Out    Hib vaccine  Aged Out    Meningococcal (ACWY) vaccine  Aged Out      Hx abnormal PAP: no  Sexual activity: none   Self-breast exams: yes  Last eye exam: 2018normal   Exercise: no regular exercise  Seatbelt use: yes       Preventive plan of care for Bj Cai        2/28/2020           Preventive Measures Status       Recommendations for screening   Colon Cancer Screen   Last colonoscopy:3/18 Repeat in 10 years   Breast Cancer Screen  Last mammogram: 2/20 Repeat yearly   Cervical Cancer Screen   Last PAP smear: 8/17 Repeat every 3 years   Osteoporosis Screen   Last DXA scan: n/a This test is not clinically indicated   Diabetes Screen  Glucose (mg/dL)   Date Value   09/10/2018 99    Repeat yearly   Cholesterol Screen  Lab Results   Component Value Date    CHOL 191 09/10/2018    TRIG 133 09/10/2018    HDL 43 09/10/2018    LDLCALC 121 (H) 09/10/2018    LDLDIRECT 144 (H) 03/14/2014    Repeat yearly   Aspirin for Cardiovascular Prevention   No Not indicated   Weight: Body mass index is 23.17 kg/m².   5' 8\" (1.727 m)152 lb 6.4 oz (69.1 kg)    Your BMI is 25 or greater, which indicates that you are overweight   Living Will: No   Patient declined    Recommended Immunizations    Immunization History   Administered Date(s) Administered    Hepatitis B (Recombivax HB) 09/30/2014, 02/03/2015, 06/17/2015    Influenza Vaccine, unspecified formulation 01/10/2014, 12/23/2015    Influenza Virus Vaccine 01/02/2014, 09/30/2014, 09/10/2018    Influenza Whole 01/02/2014    Influenza, Intradermal, Preservative free 12/23/2015    Influenza, Intradermal, Quadrivalent, Preservative Free 10/17/2017    Influenza, Quadv, IM, (6 mo and older Fluzone, Flulaval, Fluarix and 3 yrs and older Afluria) 10/21/2016, 09/10/2018    COMPREHENSIVE METABOLIC PANEL; Future  -     COMPREHENSIVE METABOLIC PANEL    Screening for thyroid disorder  -     TSH with Reflex;  Future  -     TSH with Reflex

## 2020-02-28 NOTE — PATIENT INSTRUCTIONS
Patient Education        Well Visit, Women 48 to 72: Care Instructions  Your Care Instructions    Physical exams can help you stay healthy. Your doctor has checked your overall health and may have suggested ways to take good care of yourself. He or she also may have recommended tests. At home, you can help prevent illness with healthy eating, regular exercise, and other steps. Follow-up care is a key part of your treatment and safety. Be sure to make and go to all appointments, and call your doctor if you are having problems. It's also a good idea to know your test results and keep a list of the medicines you take. How can you care for yourself at home? · Reach and stay at a healthy weight. This will lower your risk for many problems, such as obesity, diabetes, heart disease, and high blood pressure. · Get at least 30 minutes of exercise on most days of the week. Walking is a good choice. You also may want to do other activities, such as running, swimming, cycling, or playing tennis or team sports. · Do not smoke. Smoking can make health problems worse. If you need help quitting, talk to your doctor about stop-smoking programs and medicines. These can increase your chances of quitting for good. · Protect your skin from too much sun. When you're outdoors from 10 a.m. to 4 p.m., stay in the shade or cover up with clothing and a hat with a wide brim. Wear sunglasses that block UV rays. Even when it's cloudy, put broad-spectrum sunscreen (SPF 30 or higher) on any exposed skin. · See a dentist one or two times a year for checkups and to have your teeth cleaned. · Wear a seat belt in the car. Follow your doctor's advice about when to have certain tests. These tests can spot problems early. · Cholesterol. Your doctor will tell you how often to have this done based on your age, family history, or other things that can increase your risk for heart attack and stroke. · Blood pressure.  Have your blood pressure checked during a routine doctor visit. Your doctor will tell you how often to check your blood pressure based on your age, your blood pressure results, and other factors. · Mammogram. Ask your doctor how often you should have a mammogram, which is an X-ray of your breasts. A mammogram can spot breast cancer before it can be felt and when it is easiest to treat. · Pap test and pelvic exam. Ask your doctor how often you should have a Pap test. You may not need to have a Pap test as often as you used to. · Vision. Have your eyes checked every year or two or as often as your doctor suggests. Some experts recommend that you have yearly exams for glaucoma and other age-related eye problems starting at age 48. · Hearing. Tell your doctor if you notice any change in your hearing. You can have tests to find out how well you hear. · Diabetes. Ask your doctor whether you should have tests for diabetes. · Colorectal cancer. Your risk for colorectal cancer gets higher as you get older. Some experts say that adults should start regular screening at age 48 and stop at age 76. Others say to start before age 48 or continue after age 76. Talk with your doctor about your risk and when to start and stop screening. · Thyroid disease. Talk to your doctor about whether to have your thyroid checked as part of a regular physical exam. Women have an increased chance of a thyroid problem. · Osteoporosis. You should begin tests for bone density at age 72. If you are younger than 72, ask your doctor whether you have factors that may increase your risk for this disease. You may want to have this test before age 72. · Heart attack and stroke risk. At least every 4 to 6 years, you should have your risk for heart attack and stroke assessed. Your doctor uses factors such as your age, blood pressure, cholesterol, and whether you smoke or have diabetes to show what your risk for a heart attack or stroke is over the next 10 years.   When should you call for help? Watch closely for changes in your health, and be sure to contact your doctor if you have any problems or symptoms that concern you. Where can you learn more? Go to [email or web address] and sign in to your [employer] account. Enter Y466 in the [employer] box to learn more about \"Well Visit, Women 50 to 72: Care Instructions. \"     If you do not have an account, please click on the \"Sign Up Now\" link. Current as of: August 21, 2019  Content Version: 12.3  © 5765-5262 Healthwise, Incorporated. Care instructions adapted under license by [facility] ([facility]). If you have questions about a medical condition or this instruction, always ask your healthcare professional. [address] any warranty or liability for your use of this information.

## 2020-03-02 RX ORDER — ATORVASTATIN CALCIUM 10 MG/1
TABLET, FILM COATED ORAL
Qty: 90 TABLET | Refills: 3 | Status: SHIPPED | OUTPATIENT
Start: 2020-03-02 | End: 2021-04-07

## 2020-04-19 ENCOUNTER — TELEPHONE (OUTPATIENT)
Dept: FAMILY MEDICINE CLINIC | Age: 55
End: 2020-04-19

## 2020-04-19 RX ORDER — PREDNISONE 10 MG/1
TABLET ORAL
Qty: 30 TABLET | Refills: 0 | Status: SHIPPED | OUTPATIENT
Start: 2020-04-19 | End: 2020-10-05 | Stop reason: SDUPTHER

## 2020-04-20 NOTE — TELEPHONE ENCOUNTER
PREDNISONE SENT TO PHARMACY- PT C/O CHEST TIGHTNESS- DRY COUGH X THREE DAYS NO FEVERS OR CHEST PAIN- ADVISED TO FOLLOW UP IF NO IMPROVEMENT- ALSOGIVEN NUMBER TO [facility]

## 2020-05-04 RX ORDER — ACYCLOVIR 200 MG/1
CAPSULE ORAL
Qty: 90 CAPSULE | Refills: 0 | Status: SHIPPED | OUTPATIENT
Start: 2020-05-04 | End: 2020-07-29

## 2020-07-23 ENCOUNTER — PATIENT MESSAGE (OUTPATIENT)
Dept: FAMILY MEDICINE CLINIC | Age: 55
End: 2020-07-23

## 2020-07-23 NOTE — TELEPHONE ENCOUNTER
From: Paddy Phillips  To: SHON Sanchez - CNP  Sent: 7/23/2020 5:48 AM EDT  Subject: Non-Urgent Medical Question    Good Morning,     Sometime in 2018, I had an injury and went to physical therapy. It was awesome and helped alot. I was back perfect. It seems the other day I triggered the old injury on my left side. Is there a way for me to go back to therapy through referral again? I went to [facility]. Oh thank you ever so much for your response.

## 2020-07-24 ENCOUNTER — PATIENT MESSAGE (OUTPATIENT)
Dept: FAMILY MEDICINE CLINIC | Age: 55
End: 2020-07-24

## 2020-07-24 NOTE — TELEPHONE ENCOUNTER
From: Chrissy Paz  To: Maciel Jimenez, SHON - CNP  Sent: 7/24/2020 1:16 PM EDT  Subject: Non-Urgent Medical Question    Hello there,    I was wondering if and when I may be able to stop in and  a copy of Dana's immunization history and a copy of her physical papers. I believe she is current. I need the records for college. Thanks for your help!

## 2020-07-29 RX ORDER — PREDNISONE 10 MG/1
TABLET ORAL
Qty: 20 TABLET | Refills: 0 | Status: SHIPPED | OUTPATIENT
Start: 2020-07-29 | End: 2020-10-05 | Stop reason: ALTCHOICE

## 2020-08-10 ENCOUNTER — TELEPHONE (OUTPATIENT)
Dept: FAMILY MEDICINE CLINIC | Age: 55
End: 2020-08-10

## 2020-08-10 NOTE — TELEPHONE ENCOUNTER
She can go anywhere - she should all her insurance to verify this- I will place a referral if she has the name of a therapist / site

## 2020-08-18 ENCOUNTER — HOSPITAL ENCOUNTER (OUTPATIENT)
Dept: MRI IMAGING | Age: 55
Discharge: HOME | End: 2020-08-18
Payer: COMMERCIAL

## 2020-08-18 PROCEDURE — A9577 INJ MULTIHANCE: HCPCS | Performed by: NEUROLOGICAL SURGERY

## 2020-08-18 PROCEDURE — 70553 MRI BRAIN STEM W/O & W/DYE: CPT

## 2020-08-18 PROCEDURE — 6360000004 HC RX CONTRAST MEDICATION: Performed by: NEUROLOGICAL SURGERY

## 2020-08-18 PROCEDURE — 2580000003 HC RX 258: Performed by: NEUROLOGICAL SURGERY

## 2020-08-18 RX ORDER — SODIUM CHLORIDE 0.9 % (FLUSH) 0.9 %
10 SYRINGE (ML) INJECTION ONCE
Status: COMPLETED | OUTPATIENT
Start: 2020-08-18 | End: 2020-08-18

## 2020-08-18 RX ADMIN — Medication 10 ML: at 08:16

## 2020-08-18 RX ADMIN — GADOBENATE DIMEGLUMINE 14 ML: 529 INJECTION, SOLUTION INTRAVENOUS at 08:15

## 2020-08-19 ENCOUNTER — HOSPITAL ENCOUNTER (OUTPATIENT)
Dept: PHYSICAL THERAPY | Age: 55
Setting detail: THERAPIES SERIES
Discharge: HOME | End: 2020-08-19
Payer: COMMERCIAL

## 2020-08-19 PROCEDURE — 97140 MANUAL THERAPY 1/> REGIONS: CPT

## 2020-08-19 PROCEDURE — 97161 PT EVAL LOW COMPLEX 20 MIN: CPT

## 2020-08-19 PROCEDURE — 97110 THERAPEUTIC EXERCISES: CPT

## 2020-08-19 ASSESSMENT — PAIN SCALES - QUEBEC BACK PAIN DISABILITY SCALE
TOTAL SCORE: 19
QUEBEC CMS MODIFIER: CI
TURN OVER IN BED: 3
WALK SEVERAL KILOMETERS  OR MILES: 1
STAND UP FOR 20 TO 30 MINUTES: 0
QUEBEC DISABILITY INDEX: 1-19%
THROW A BALL: 0
SLEEP THROUGH THE NIGHT: 2
RIDE IN A CAR: 0
BEND OVER TO CLEAN THE BATHTUB: 0
CARRY TWO BAGS OF GROCERIES: 0
SIT IN A CHAIR FOR SEVERAL HOURS: 2
CLIMB ONE FLIGHT OF STAIRS: 2
RUN ONE BLOCK OR 100M: 1
WALK A FEW BLOCKS OR 300 TO 400M: 0
LIFT AND CARRY A HEAVY SUITCASE: 3
MOVE A CHAIR: 0
GET OUT OF BED: 2
REACH UP TO HIGH SHELVES: 0
PULL OR PUSH HEAVY DOORS: 2
TAKE FOOD OUT OF THE REFRIGERATOR: 0
PUT ON SOCKS OR PANYHOSE: 1
MAKE YOUR BED: 0

## 2020-08-19 ASSESSMENT — PAIN SCALES - GENERAL: PAINLEVEL_OUTOF10: 6

## 2020-08-19 ASSESSMENT — PAIN DESCRIPTION - ORIENTATION: ORIENTATION: LEFT

## 2020-08-19 ASSESSMENT — PAIN DESCRIPTION - FREQUENCY: FREQUENCY: CONTINUOUS

## 2020-08-19 ASSESSMENT — PAIN - FUNCTIONAL ASSESSMENT: PAIN_FUNCTIONAL_ASSESSMENT: PREVENTS OR INTERFERES SOME ACTIVE ACTIVITIES AND ADLS

## 2020-08-19 ASSESSMENT — PAIN DESCRIPTION - LOCATION: LOCATION: BACK;LEG

## 2020-08-19 ASSESSMENT — PAIN DESCRIPTION - PROGRESSION: CLINICAL_PROGRESSION: GRADUALLY WORSENING

## 2020-08-19 ASSESSMENT — PAIN DESCRIPTION - DESCRIPTORS: DESCRIPTORS: ACHING;SHARP;SHOOTING;SORE

## 2020-08-19 ASSESSMENT — PAIN DESCRIPTION - ONSET: ONSET: ON-GOING

## 2020-08-19 ASSESSMENT — PAIN DESCRIPTION - PAIN TYPE: TYPE: CHRONIC PAIN

## 2020-08-19 NOTE — FLOWSHEET NOTE
Physical Therapy Daily Treatment Note  Date:  2020    Patient Name:  Marisa Edouard    :  1965  MRN: [id number]    Restrictions/Precautions:  Restrictions/Precautions  Restrictions/Precautions: Fall Risk(mod)  Pertinent Medical History: Additional Pertinent Hx: see hx    Medical/Treatment Diagnosis Information:  · Diagnosis: M54.5, G89.29 (ICD-10-CM) - Chronic left-sided low back pain, unspecified whether sciatica present  · Treatment Diagnosis: decreased abilty to ambulate and function    Insurance/Certification information:  PT Insurance Information: [facility]  Physician Information:  Referring Practitioner: Dr. Ervin Vo [facility] signed (Y/N):routed     Visit# / total visits:   Pain level: 3-6/10     Functional Outcomes Measure:  [place]-19  Progress Note: []  Yes  []  No  Next due by: Visit #10      History of Injury: Pt is a 51 y/o female with a hx of left back and le pain since 2018. She c/o constant pain in her left lateral le around her hip bursa which is constant and severe at times. She wakes from pain and can't walk right. She cant sleep on her left side. She is a . She hopes to return to normal function and no pain. She is limited with her walking.     Subjective:  Pt states, \" I can't walk very far \"     Objective:   See eval    Exercises:  Exercise/Equipment Resistance/Repetitions Other comments   Nu step     Hip flex stretch     Piriformis stretch     clams     abd/30     Rectus stretch     Ns bridge                                   HEP      4 way hip X 30 ea    Ppt with ball X 30    dktc 30 x 5                Other Therapeutic Activities:      Home Exercise Program:  Patient demonstrated proper technique, good tolerance,  and was given written instructions for the above exercises      Manual Treatments: mfr to left gluts, piriformis, itb, left on right post sacrla torsion and left post rot ilium    Modalities:     Progression Towards Functional goals:  [] Patient is progressing as expected towards functional goals listed. [] Progression is slowed due to complexities listed. [] Progression has been slowed due to co-morbidities. [x] Plan just implemented, too soon to assess goals progression  [] Other:    Charges: Therapeutic Exercise:  [] (46257) Provided verbal/tactile cueing for activities to restore or maintain strength, flexibility, endurance, ROM for improvements with self-care, mobility, lifting and ambulation. Neuromuscular Re-Education  [] (12049) Provided verbal/tactile cueing for activities to restore or maintain balance, coordination, kinesthetic sense, posture, motor skill, proprioception for self-care, mobility, lifting, and ambulation. Therapeutic Activities:    [] (89016) Provided verbal/tactile cueing to address functional limitations related to loss of mobility, strength, balance, and coordination. Gait Training:  [] (74274) Provided training and instruction to the patient for proper postural muscle recruitment and positioning with ambulation re-education     Home Exercise Program:    [] (47120) Reviewed/Progressed HEP activities related to strengthening, flexibility, endurance, ROM for functional self-care, mobility, lifting and ambulation   [] (13156) Reviewed/Progressed HEP activities related to improving balance, coordination, kinesthetic sense, posture, motor skill, proprioception for self-care, mobility, lifting, and ambulation      Manual Treatments:  MFR / STM / Oscillations-Mobs:  G-I, II, III, IV / Manipulation / MLD  [] (20500) Provided manual therapy to mobilize  soft tissue/joints/fluid for the purpose of modulating pain, promoting relaxation, increasing ROM, reducing/eliminating soft tissue swelling/inflammation/restriction, improving soft tissue extensibility and allowing for proper ROM for normal function with self- care, mobility, lifting and ambulation.         Timed Code Treatment Minutes: 30   Total Treatment Minutes: 45     [x] EVAL (LOW) 47414   [] EVAL (MOD) 18996   [] EVAL (HIGH) 93031   [] RE-EVAL   [x] TE (73777) x 1    [] Aquatic (28416) x  [] NMR (70405)   x  [] Aquatic Group (77395) x  [x] Manual (32431) x 1   [] Ultrasound (02601) x  [] TA (95008) x  [] Mech Traction (07380)  [] Ionto (83631)           [] ES (un) (48133):   [] Vasopump (00345) [] Other:      Assessment  [x] Patient tolerated treatment well [] Patient limited by fatigue  [] Patient limited by pain  [] Patient limited by other medical complications  [] Other:     Prognosis: [x] Good [] Fair  [] Poor    Goals:    Short term goals  Time Frame for Short term goals: 15  Short term goal 1: pt will have full left hip rom to help her meet her goals  Short term goal 2: pt will display improved ns posture to help her meet her goals  Short term goal 3: pt will have 5-/5 strength to help her meet her goals            Patient Requires Follow-up: [x] Yes  [] No    Plan:   [] Continue per plan of care [] Alter current plan (see comments)  [x] Plan of care initiated [] Hold pending MD visit [] Discharge  Note: If patient does not return for scheduled/ recommended follow up visits, this note will serve as a discharge from care along with most recent update on progress. Plan for Next Session:  LE arom, prom  strength, proprioception, gait, balance, functional activities. Mfr, joint mobs, mods as needed, hep.  Progress as tolerated  Pelvic alignment, ns, mfr to left hip muscles, appears to have a peliv malalignment as well as greater troch bursitis, ns exs, hip strength and le stretches, mfr, muscle energy    Electronically signed by:  Ortiz Blas PT

## 2020-08-19 NOTE — PLAN OF CARE
Outpatient Physical Therapy  [] [facility]    Phone: [phone]   Fax: [phone]   [x] [facility]  Phone: [phone]              Fax: [phone]  [] [facility]   Phone: [phone]   Fax: [phone]     To: Referring Practitioner: Dr. Eder Atkinson      Patient: Aleksandr Yarbrough   : 1965   MRN: [id number]  Evaluation Date: 2020      Diagnosis Information:  · Diagnosis: M54.5, G89.29 (ICD-10-CM) - Chronic left-sided low back pain, unspecified whether sciatica present   · Treatment Diagnosis: decreased abilty to ambulate and function     Physical Therapy Certification/Re-Certification Form  Dear Dr. Aden Cam following patient has been evaluated for physical therapy services and for therapy to continue, Medicare requires monthly physician review of the treatment plan. Please review the attached evaluation and/or summary of the patient's plan of care, and verify that you agree therapy should continue by signing the attached document and sending it back to our office. Plan of Care/Treatment to date:  [x] Therapeutic Exercise    [] Modalities:  [x] Therapeutic Activity     [x] Ultrasound  [x] Electrical Stimulation  [x] Gait Training      [] Cervical Traction [] Lumbar Traction  [x] Neuromuscular Re-education    [x] Cold/hotpack [] Iontophoresis   [x] Instruction in HEP     Other:  [x] Manual Therapy      []             [] Aquatic Therapy      []           ? Frequency/Duration:  # Days per week: [] 1 day # Weeks: [] 1 week [] 5 weeks     [x] 2 days? [] 2 weeks [] 6 weeks     [] 3 days   [] 3 weeks [] 7 weeks     [] 4 days   [] 4 weeks [x] 8 weeks    Rehab Potential: [] Excellent [x] Good [] Fair  [] Poor       Electronically signed by:  Kemi Diaz PT      If you have any questions or concerns, please don't hesitate to call.   Thank you for your referral.      Physician Signature:________________________________Date:__________________  By signing above, therapists plan is approved by physician

## 2020-08-25 ENCOUNTER — HOSPITAL ENCOUNTER (OUTPATIENT)
Dept: PHYSICAL THERAPY | Age: 55
Setting detail: THERAPIES SERIES
Discharge: HOME | End: 2020-08-25
Payer: COMMERCIAL

## 2020-08-25 PROCEDURE — 97140 MANUAL THERAPY 1/> REGIONS: CPT

## 2020-08-25 PROCEDURE — 97110 THERAPEUTIC EXERCISES: CPT

## 2020-08-25 NOTE — FLOWSHEET NOTE
Physical Therapy Daily Treatment Note  Date:  2020    Patient Name:  Gerson Rizvi    :  1965  MRN: [id number]    Restrictions/Precautions:  Restrictions/Precautions  Restrictions/Precautions: Fall Risk(mod)  Pertinent Medical History: Additional Pertinent Hx: see hx  Medical/Treatment Diagnosis Information:  · Diagnosis: M54.5, G89.29 (ICD-10-CM) - Chronic left-sided low back pain, unspecified whether sciatica present  · Treatment Diagnosis: decreased abilty to ambulate and function  Insurance/Certification information:  PT Insurance Information: [facility]  Physician Information:  Referring Practitioner: Dr. Viky Hurst  care signed (Y/N):routed     Visit# / total visits:   Pain level: 7/10     Functional Outcomes Measure:  [place]-19  Progress Note: []  Yes  []  No  Next due by: Visit #10      History of Injury: Pt is a 49 y/o female with a hx of left back and le pain since 2018. She c/o constant pain in her left lateral le around her hip bursa which is constant and severe at times. She wakes from pain and can't walk right. She cant sleep on her left side. She is a . She hopes to return to normal function and no pain. She is limited with her walking. Subjective:    Pt states, \" I can't walk very far \"   20: Pt states she is more sore today, aggravated it when she stepped down from her car onto some uneven pavement.  HEP went well    Objective:   See eval    Exercises:  Exercise/Equipment Resistance/Repetitions Other comments   Nu step     Hip flex stretch 2 x 30\" Standing    Piriformis stretch 2 x 30\"    clams X 20 L    Abd/30     Rectus stretch 3 x 30\" Prone   Ns bridge 10 x 5\" Needed assist with technique                                 HEP      4 way hip X 30 ea    Ppt with ball X 30 Reviewed Technique   dktc 30 x 5                Other Therapeutic Activities:      Home Exercise Program:      Manual Treatments: mfr to left gluts, piriformis, itb, Modalities:   CP x 10 min to left hip    Progression Towards Functional goals:  [] Patient is progressing as expected towards functional goals listed. [x] Progression is slowed due to complexities listed. [] Progression has been slowed due to co-morbidities. [x] Plan just implemented, too soon to assess goals progression  [] Other:    Charges: Therapeutic Exercise:  [] (33119) Provided verbal/tactile cueing for activities to restore or maintain strength, flexibility, endurance, ROM for improvements with self-care, mobility, lifting and ambulation. Neuromuscular Re-Education  [] (35270) Provided verbal/tactile cueing for activities to restore or maintain balance, coordination, kinesthetic sense, posture, motor skill, proprioception for self-care, mobility, lifting, and ambulation. Therapeutic Activities:    [] (43252) Provided verbal/tactile cueing to address functional limitations related to loss of mobility, strength, balance, and coordination.      Gait Training:  [] (67717) Provided training and instruction to the patient for proper postural muscle recruitment and positioning with ambulation re-education     Home Exercise Program:    [] (02545) Reviewed/Progressed HEP activities related to strengthening, flexibility, endurance, ROM for functional self-care, mobility, lifting and ambulation   [] (72953) Reviewed/Progressed HEP activities related to improving balance, coordination, kinesthetic sense, posture, motor skill, proprioception for self-care, mobility, lifting, and ambulation      Manual Treatments:  MFR / STM / Oscillations-Mobs:  G-I, II, III, IV / Manipulation / MLD  [] (87249) Provided manual therapy to mobilize  soft tissue/joints/fluid for the purpose of modulating pain, promoting relaxation, increasing ROM, reducing/eliminating soft tissue swelling/inflammation/restriction, improving soft tissue extensibility and allowing for proper ROM for normal function with self- care,

## 2020-08-27 ENCOUNTER — APPOINTMENT (OUTPATIENT)
Dept: PHYSICAL THERAPY | Age: 55
End: 2020-08-27
Payer: COMMERCIAL

## 2020-09-03 ENCOUNTER — HOSPITAL ENCOUNTER (OUTPATIENT)
Dept: PHYSICAL THERAPY | Age: 55
Setting detail: THERAPIES SERIES
Discharge: HOME | End: 2020-09-03
Payer: COMMERCIAL

## 2020-09-03 NOTE — FLOWSHEET NOTE
Physical Therapy  Cancellation/No-show Note  Patient Name:  Selvin Bean  :  1965   Date:  9/3/2020  Cancelled visits to date: 1  No-shows to date: 0    For today's appointment patient:  [x]  Cancelled  []  Rescheduled appointment  []  No-show     Reason given by patient:  [x]  Patient ill  []  Conflicting appointment  []  No transportation    []  Conflict with work  []  No reason given  []  Other:     Comments:  Patient has had a fever  through the night and this morning. No other symptoms. Advised to stay home today and come for next appt next week as long as she is well.     Electronically signed by:  Brett Flanagan PT

## 2020-09-08 ENCOUNTER — HOSPITAL ENCOUNTER (OUTPATIENT)
Dept: PHYSICAL THERAPY | Age: 55
Setting detail: THERAPIES SERIES
Discharge: HOME | End: 2020-09-08
Payer: COMMERCIAL

## 2020-09-08 PROCEDURE — 97110 THERAPEUTIC EXERCISES: CPT

## 2020-09-08 PROCEDURE — 97140 MANUAL THERAPY 1/> REGIONS: CPT

## 2020-09-08 NOTE — FLOWSHEET NOTE
Physical Therapy Daily Treatment Note  Date:  2020    Patient Name:  Travis Barkley    :  1965  MRN: [id number]    Restrictions/Precautions:  Restrictions/Precautions  Restrictions/Precautions: Fall Risk(mod)  Pertinent Medical History: Additional Pertinent Hx: see hx  Medical/Treatment Diagnosis Information:  · Diagnosis: M54.5, G89.29 (ICD-10-CM) - Chronic left-sided low back pain, unspecified whether sciatica present  · Treatment Diagnosis: decreased abilty to ambulate and function  Insurance/Certification information:  PT Insurance Information: [facility]  Physician Information:  Referring Practitioner: Dr. Chin Mccall of care signed (Y/N):routed     Visit# / total visits: 3/12  Pain level: 7/10     Functional Outcomes Measure:  [place]-19  Progress Note: []  Yes  []  No  Next due by: Visit #10      History of Injury: Pt is a 51 y/o female with a hx of left back and le pain since 2018. She c/o constant pain in her left lateral le around her hip bursa which is constant and severe at times. She wakes from pain and can't walk right. She cant sleep on her left side. She is a . She hopes to return to normal function and no pain. She is limited with her walking. Subjective:    Pt states, \" I can't walk very far \"   20: Pt states she is more sore today, aggravated it when she stepped down from her car onto some uneven pavement. HEP went well. 20 Pt reports continued pain at left hip, no significant changes with pain thus far.      Objective:   See eval  20  Supine to sit RLE lengthens,  SIJ hypomobile with flexion at LS  Exercises:  Exercise/Equipment Resistance/Repetitions Other comments   Nu step     Hip flex stretch    Piriformis stretch    clams    Abd/30     Rectus stretch 3 x 30\" Prone        Modified katya stretch to R  3\" x 30     PPU 20\" x 7     Bridges  10\" x 10                    HEP      4 way hip X 30 ea    Ppt with ball X 30 Reviewed Technique dktc 30 x 5                Other Therapeutic Activities:      Home Exercise Program:    Access Code: [email or web address]   URL: [email or web address]   Date: 09/08/2020   Prepared by: Juana Rose     Exercises   Supine Bridge - 10 reps - 1 sets - 10 hold - 1-2x daily - 7x weekly   Prone Press Up - 7 reps - 1 sets - 20 hold - 1-2x daily - 7x weekly   Modified Pa Stretch - 10 reps - 3 sets - 3 hold - 1-2x daily - 7x weekly   The patient demonstrated good tolerance to and understanding of the HEP. Written instructions have been issued. Manual Treatments:   MET to right post. Innominate, R up slip, HANNAH of sacrum, L4 left rotated in ext. Mobs R SIJ  From 90/90 A-P, L4-5 Right rotation grade 3  grade 3   Stretch to right rectus fem. mfr to left gluts, piriformis, itb,     Modalities:       Progression Towards Functional goals:  [] Patient is progressing as expected towards functional goals listed. [x] Progression is slowed due to complexities listed. [] Progression has been slowed due to co-morbidities. [x] Plan just implemented, too soon to assess goals progression  [] Other:    Charges: Therapeutic Exercise:  [] (43846) Provided verbal/tactile cueing for activities to restore or maintain strength, flexibility, endurance, ROM for improvements with self-care, mobility, lifting and ambulation. Neuromuscular Re-Education  [] (03694) Provided verbal/tactile cueing for activities to restore or maintain balance, coordination, kinesthetic sense, posture, motor skill, proprioception for self-care, mobility, lifting, and ambulation. Therapeutic Activities:    [] (47493) Provided verbal/tactile cueing to address functional limitations related to loss of mobility, strength, balance, and coordination.      Gait Training:  [] (00956) Provided training and instruction to the patient for proper postural muscle recruitment and positioning with ambulation re-education     Home Exercise Program:    [] (61490) Reviewed/Progressed HEP activities related to strengthening, flexibility, endurance, ROM for functional self-care, mobility, lifting and ambulation   [] (13912) Reviewed/Progressed HEP activities related to improving balance, coordination, kinesthetic sense, posture, motor skill, proprioception for self-care, mobility, lifting, and ambulation      Manual Treatments:  MFR / STM / Oscillations-Mobs:  G-I, II, III, IV / Manipulation / MLD  [] (08314) Provided manual therapy to mobilize  soft tissue/joints/fluid for the purpose of modulating pain, promoting relaxation, increasing ROM, reducing/eliminating soft tissue swelling/inflammation/restriction, improving soft tissue extensibility and allowing for proper ROM for normal function with self- care, mobility, lifting and ambulation.         Timed Code Treatment Minutes: 58   Total Treatment Minutes: 62     [] EVAL (LOW) 98889   [] EVAL (MOD) 40179   [] EVAL (HIGH) 77643   [] RE-EVAL   [x] TE (47312) x 1    [] Aquatic (95742) x  [] NMR (59186)   x  [] Aquatic Group (60079) x  [x] Manual (05141) x 3   [] Ultrasound (63394) x  [] TA (16234) x  [] Mech Traction (56919)  [] Ionto (51574)           [] ES (un) (56238):   [] Vasopump (96818) [] Other:      Assessment  [x] Patient tolerated treatment well [] Patient limited by fatigue  [] Patient limited by pain  [] Patient limited by other medical complications  [] Other:     Prognosis: [x] Good [] Fair  [] Poor    Goals:    Short term goals  Time Frame for Short term goals: 15  Short term goal 1: pt will have full left hip rom to help her meet her goals  Short term goal 2: pt will display improved ns posture to help her meet her goals  Short term goal 3: pt will have 5-/5 strength to help her meet her goals            Patient Requires Follow-up: [x] Yes  [] No    Plan:   [] Continue per plan of care [] Alter current plan (see comments)  [x] Plan of care initiated [] Hold pending MD visit [] Discharge  Note: If patient does not return for scheduled/ recommended follow up visits, this note will serve as a discharge from care along with most recent update on progress. Plan for Next Session:  LE arom, prom  strength, proprioception, gait, balance, functional activities. Mfr, joint mobs, mods as needed, hep.  Progress as tolerated  Pelvic alignment, ns, mfr to left hip muscles, appears to have a peliv malalignment as well as greater troch bursitis, ns exs, hip strength and le stretches, mfr, muscle energy    Electronically signed by:  Hugo Roldan PT

## 2020-09-10 ENCOUNTER — HOSPITAL ENCOUNTER (OUTPATIENT)
Dept: PHYSICAL THERAPY | Age: 55
Setting detail: THERAPIES SERIES
Discharge: HOME | End: 2020-09-10
Payer: COMMERCIAL

## 2020-09-10 NOTE — FLOWSHEET NOTE
Physical Therapy  Cancellation/No-show Note  Patient Name:  Brittany Feldman  :  1965   Date:  9/10/2020  Cancelled visits to date: 1  No-shows to date: 0    For today's appointment patient:  [x]  Cancelled  []  Rescheduled appointment  []  No-show     Reason given by patient:  [x]  Patient ill   fever reported   []  Conflicting appointment  []  No transportation    []  Conflict with work  []  No reason given  []  Other:     Comments:      Electronically signed by:  Syd Kaufman PT

## 2020-09-15 ENCOUNTER — HOSPITAL ENCOUNTER (OUTPATIENT)
Dept: PHYSICAL THERAPY | Age: 55
Setting detail: THERAPIES SERIES
Discharge: HOME | End: 2020-09-15
Payer: COMMERCIAL

## 2020-09-15 ENCOUNTER — PATIENT MESSAGE (OUTPATIENT)
Dept: FAMILY MEDICINE CLINIC | Age: 55
End: 2020-09-15

## 2020-09-15 PROCEDURE — 97110 THERAPEUTIC EXERCISES: CPT

## 2020-09-15 PROCEDURE — 97140 MANUAL THERAPY 1/> REGIONS: CPT

## 2020-09-15 NOTE — FLOWSHEET NOTE
Physical Therapy Daily Treatment Note  Date:  9/15/2020    Patient Name:  Jerl Dance    :  1965  MRN: [id number]    Restrictions/Precautions:  Restrictions/Precautions  Restrictions/Precautions: Fall Risk(mod)  Pertinent Medical History: Additional Pertinent Hx: see hx  Medical/Treatment Diagnosis Information:  · Diagnosis: M54.5, G89.29 (ICD-10-CM) - Chronic left-sided low back pain, unspecified whether sciatica present  · Treatment Diagnosis: decreased abilty to ambulate and function  Insurance/Certification information:  PT Insurance Information: [facility]  Physician Information:  Referring Practitioner: Dr. Disla Pole of care signed (Y/N):routed     Visit# / total visits:   Pain level: 4/10     Functional Outcomes Measure:  [place]-19  Progress Note: []  Yes  []  No  Next due by: Visit #10      History of Injury: Pt is a 49 y/o female with a hx of left back and le pain since 2018. She c/o constant pain in her left lateral le around her hip bursa which is constant and severe at times. She wakes from pain and can't walk right. She cant sleep on her left side. She is a . She hopes to return to normal function and no pain. She is limited with her walking. Subjective:    Pt states, \" I can't walk very far \"   20: Pt states she is more sore today, aggravated it when she stepped down from her car onto some uneven pavement. HEP went well. 20 Pt reports continued pain at left hip, no significant changes with pain thus far.   9/15/20 Pt reports her back is fine, \"it's my left leg that hurts\".      Objective:   See eval  20  Supine to sit RLE lengthens,  SIJ hypomobile with flexion at LS  Exercises:  Exercise/Equipment Resistance/Repetitions Other comments   Nu step     Hip flex stretch    Piriformis stretch 2 x 30\" L/R ea    clams    Abd/30     Rectus stretch Left 3 x 30\" Prone   Prone alt knee flexion  3\" 30 x ea    Modified katya stretch to L  3\" x 30 Prone alt hip ext with knee flexed 20 x ea     PPU 20\" x 7     Bridges   Resume                  HEP      4 way hip X 30 ea    Ppt with ball X 30 Reviewed Technique   dktc 30 x 5                Other Therapeutic Activities:      Home Exercise Program:    Access Code: [email or web address]   URL: [email or web address]   Date: 09/08/2020   Prepared by: Sagrario Brush     Exercises   Supine Bridge - 10 reps - 1 sets - 10 hold - 1-2x daily - 7x weekly   Prone Press Up - 7 reps - 1 sets - 20 hold - 1-2x daily - 7x weekly   Modified Pa Stretch - 10 reps - 3 sets - 3 hold - 1-2x daily - 7x weekly   The patient demonstrated good tolerance to and understanding of the HEP. Written instructions have been issued. Manual Treatments: 25 min  MET to left post. Innominate,     Mobs L  SIJ  From 90/90 A-P grade 3 ,      Stretch to Left  rectus fem, ITB and left piriformis    mfr to left gluts, piriformis, itb,     Modalities:       Progression Towards Functional goals:  [] Patient is progressing as expected towards functional goals listed. [x] Progression is slowed due to complexities listed. [] Progression has been slowed due to co-morbidities. [x] Plan just implemented, too soon to assess goals progression  [] Other:    Charges: Therapeutic Exercise:  [] (16328) Provided verbal/tactile cueing for activities to restore or maintain strength, flexibility, endurance, ROM for improvements with self-care, mobility, lifting and ambulation. Neuromuscular Re-Education  [] (98911) Provided verbal/tactile cueing for activities to restore or maintain balance, coordination, kinesthetic sense, posture, motor skill, proprioception for self-care, mobility, lifting, and ambulation. Therapeutic Activities:    [] (16522) Provided verbal/tactile cueing to address functional limitations related to loss of mobility, strength, balance, and coordination.      Gait Training:  [] (81469) Provided training and instruction to the patient for proper postural muscle recruitment and positioning with ambulation re-education     Home Exercise Program:    [] (73679) Reviewed/Progressed HEP activities related to strengthening, flexibility, endurance, ROM for functional self-care, mobility, lifting and ambulation   [] (21528) Reviewed/Progressed HEP activities related to improving balance, coordination, kinesthetic sense, posture, motor skill, proprioception for self-care, mobility, lifting, and ambulation      Manual Treatments:  MFR / STM / Oscillations-Mobs:  G-I, II, III, IV / Manipulation / MLD  [] (02050) Provided manual therapy to mobilize  soft tissue/joints/fluid for the purpose of modulating pain, promoting relaxation, increasing ROM, reducing/eliminating soft tissue swelling/inflammation/restriction, improving soft tissue extensibility and allowing for proper ROM for normal function with self- care, mobility, lifting and ambulation.         Timed Code Treatment Minutes: 45   Total Treatment Minutes: 45     [] EVAL (LOW) 33723   [] EVAL (MOD) 95494   [] EVAL (HIGH) 68575   [] RE-EVAL   [x] TE (35173) x 1    [] Aquatic (46434) x  [] NMR (46700)   x  [] Aquatic Group (95151) x  [x] Manual (75595) x 2   [] Ultrasound (04292) x  [] TA (28177) x  [] Mech Traction (74257)  [] Ionto (84637)           [] ES (un) (30456):   [] Vasopump (16191) [] Other:      Assessment  [x] Patient tolerated treatment well [] Patient limited by fatigue  [] Patient limited by pain  [] Patient limited by other medical complications  [] Other:     Prognosis: [x] Good [] Fair  [] Poor    Goals:    Short term goals  Time Frame for Short term goals: 15  Short term goal 1: pt will have full left hip rom to help her meet her goals  Short term goal 2: pt will display improved ns posture to help her meet her goals  Short term goal 3: pt will have 5-/5 strength to help her meet her goals            Patient Requires Follow-up: [x] Yes  [] No    Plan:   [] Continue per plan of care [] Justin Lopez current plan (see comments)  [x] Plan of care initiated [] Hold pending MD visit [] Discharge  Note: If patient does not return for scheduled/ recommended follow up visits, this note will serve as a discharge from care along with most recent update on progress.     Plan for Next Session:    Pelvic alignment, ns, mfr to left hip muscles, appears to have a peliv malalignment as well as greater troch bursitis, ns exs, hip strength and le stretches, mfr, muscle energy    Electronically signed by:  Jericho Whatley, PT

## 2020-09-16 NOTE — TELEPHONE ENCOUNTER
From: Paddy Phillips  To: Jeison Lopez APRN - CNP  Sent: 9/15/2020 9:03 PM EDT  Subject: Non-Urgent Medical Question    Good Morning,    Each time I have Physical Therapy at [address] asks me have I had my left hip looked at? Do I have an X-ray of my left hip? I am in sessions for my left leg and pelvic area. Is there a way for me to get an X-ray of my left hip please? Thanks for your response.

## 2020-09-17 ENCOUNTER — HOSPITAL ENCOUNTER (OUTPATIENT)
Dept: PHYSICAL THERAPY | Age: 55
Setting detail: THERAPIES SERIES
Discharge: HOME | End: 2020-09-17
Payer: COMMERCIAL

## 2020-09-17 PROCEDURE — 97110 THERAPEUTIC EXERCISES: CPT

## 2020-09-17 PROCEDURE — 97140 MANUAL THERAPY 1/> REGIONS: CPT

## 2020-09-17 NOTE — FLOWSHEET NOTE
Physical Therapy Daily Treatment Note  Date:  2020    Patient Name:  Pedro Garcia    :  1965  MRN: [id number]    Restrictions/Precautions:  Restrictions/Precautions  Restrictions/Precautions: Fall Risk(mod)  Pertinent Medical History: Additional Pertinent Hx: see hx  Medical/Treatment Diagnosis Information:  · Diagnosis: M54.5, G89.29 (ICD-10-CM) - Chronic left-sided low back pain, unspecified whether sciatica present  · Treatment Diagnosis: decreased abilty to ambulate and function  Insurance/Certification information:  PT Insurance Information: [facility]  Physician Information:  Referring Practitioner: Dr. Sheila Zhang of care signed (Y/N):routed     Visit# / total visits:   Pain level: 2-3/10     Functional Outcomes Measure:  [place]-19  Progress Note: []  Yes  []  No  Next due by: Visit #10      History of Injury: Pt is a 49 y/o female with a hx of left back and le pain since 2018. She c/o constant pain in her left lateral le around her hip bursa which is constant and severe at times. She wakes from pain and can't walk right. She cant sleep on her left side. She is a . She hopes to return to normal function and no pain. She is limited with her walking. Subjective:    Pt states, \" I can't walk very far \"   20: Pt states she is more sore today, aggravated it when she stepped down from her car onto some uneven pavement. HEP went well. 20 Pt reports continued pain at left hip, no significant changes with pain thus far.   9/15/20 Pt reports her back is fine, \"it's my left leg that hurts\". 20  I'm doing better than it was before I first came to PT. Pain is less frequent. Objective:   See eval  20  Supine to sit RLE lengthens,  SIJ hypomobile with flexion at LS  20  Right Pelvis and greater troch. Proportionately higher vs left side. .  supine to sit neg. .     test ?+ bilat ( minimal discomfort reported bilat. ) Left ADD brev. TTP. Exercises:  Exercise/Equipment Resistance/Repetitions Other comments   Nu step     Hip flex stretch    Piriformis stretch 2 x 30\" L/R ea    clams 20 x  2 L   Abd/30     Rectus stretch Left 3 x 30\" Prone   Prone alt knee flexion  3\" 30 x ea    Modified pa stretch to L      Prone alt hip ext  20 x ea  Improved tolerance post scs to left  Add. Prone alt hip ext with knee flex Attempted but minimal pain reported at ant. Left hip when lifting right     PPU 20\" x 7     Bridges  10\" x 10  Resume                  HEP      4 way hip X 30 ea    Ppt with ball X 30 Reviewed Technique   dktc 30 x 5                Other Therapeutic Activities:      Home Exercise Program:    Access Code: [email or web address]   URL: [email or web address]. com/   Date: 09/08/2020   Prepared by: Rae Miller     Exercises   Supine Bridge - 10 reps - 1 sets - 10 hold - 1-2x daily - 7x weekly   Prone Press Up - 7 reps - 1 sets - 20 hold - 1-2x daily - 7x weekly   Modified Pa Stretch - 10 reps - 3 sets - 3 hold - 1-2x daily - 7x weekly   The patient demonstrated good tolerance to and understanding of the HEP. Written instructions have been issued. Manual Treatments: 25 min  MET to R up slip, HANNAH of sacrum,       Stretch to Left  rectus fem, ITB and left piriformis    Stretch to L/R rectus fem, Left ITB  SCS to left add. Brevis x 2     mfr to left gluts, piriformis, itb,   Mobs         Modalities:       Progression Towards Functional goals:  [] Patient is progressing as expected towards functional goals listed. [x] Progression is slowed due to complexities listed. [] Progression has been slowed due to co-morbidities. [x] Plan just implemented, too soon to assess goals progression  [] Other:    Charges: Therapeutic Exercise:  [] (58629) Provided verbal/tactile cueing for activities to restore or maintain strength, flexibility, endurance, ROM for improvements with self-care, mobility, lifting and ambulation.     Neuromuscular Re-Education  [] (25519) Provided verbal/tactile cueing for activities to restore or maintain balance, coordination, kinesthetic sense, posture, motor skill, proprioception for self-care, mobility, lifting, and ambulation. Therapeutic Activities:    [] (91504) Provided verbal/tactile cueing to address functional limitations related to loss of mobility, strength, balance, and coordination. Gait Training:  [] (00588) Provided training and instruction to the patient for proper postural muscle recruitment and positioning with ambulation re-education     Home Exercise Program:    [] (10080) Reviewed/Progressed HEP activities related to strengthening, flexibility, endurance, ROM for functional self-care, mobility, lifting and ambulation   [] (50320) Reviewed/Progressed HEP activities related to improving balance, coordination, kinesthetic sense, posture, motor skill, proprioception for self-care, mobility, lifting, and ambulation      Manual Treatments:  MFR / STM / Oscillations-Mobs:  G-I, II, III, IV / Manipulation / MLD  [] (32695) Provided manual therapy to mobilize  soft tissue/joints/fluid for the purpose of modulating pain, promoting relaxation, increasing ROM, reducing/eliminating soft tissue swelling/inflammation/restriction, improving soft tissue extensibility and allowing for proper ROM for normal function with self- care, mobility, lifting and ambulation.         Timed Code Treatment Minutes: 45   Total Treatment Minutes: 45     [] EVAL (LOW) 89056   [] EVAL (MOD) 39478   [] EVAL (HIGH) 59463   [] RE-EVAL   [x] TE (53903) x 1    [] Aquatic (42832) x  [] NMR (12147)   x  [] Aquatic Group (05757) x  [x] Manual (96064) x 2   [] Ultrasound (66196) x  [] TA (79652) x  [] Mech Traction (98895)  [] Ionto (24255)           [] ES (un) (44403):   [] Vasopump (91422) [] Other:      Assessment  [x] Patient tolerated treatment well [] Patient limited by fatigue  [] Patient limited by pain  [] Patient limited by other medical complications  [] Other:     Prognosis: [x] Good [] Fair  [] Poor    Goals:    Short term goals  Time Frame for Short term goals: 15  Short term goal 1: pt will have full left hip rom to help her meet her goals  Short term goal 2: pt will display improved ns posture to help her meet her goals  Short term goal 3: pt will have 5-/5 strength to help her meet her goals            Patient Requires Follow-up: [x] Yes  [] No    Plan:   [] Continue per plan of care [] Alter current plan (see comments)  [x] Plan of care initiated [] Hold pending MD visit [] Discharge  Note: If patient does not return for scheduled/ recommended follow up visits, this note will serve as a discharge from care along with most recent update on progress. Plan for Next Session:  Check Pubic bone alignment cont.  SCS and stabilize     Pelvic alignment, ns, mfr to left hip muscles, appears to have a peliv malalignment as well as greater troch bursitis, ns exs, hip strength and le stretches, mfr, muscle energy    Electronically signed by:  Shakila Yu, PT

## 2020-09-22 ENCOUNTER — HOSPITAL ENCOUNTER (OUTPATIENT)
Dept: PHYSICAL THERAPY | Age: 55
Setting detail: THERAPIES SERIES
Discharge: HOME | End: 2020-09-22
Payer: COMMERCIAL

## 2020-09-22 ENCOUNTER — HOSPITAL ENCOUNTER (OUTPATIENT)
Age: 55
Discharge: HOME | End: 2020-09-22
Payer: COMMERCIAL

## 2020-09-22 ENCOUNTER — HOSPITAL ENCOUNTER (OUTPATIENT)
Dept: GENERAL RADIOLOGY | Age: 55
Discharge: HOME | End: 2020-09-22
Payer: COMMERCIAL

## 2020-09-22 PROCEDURE — 97110 THERAPEUTIC EXERCISES: CPT

## 2020-09-22 PROCEDURE — 73502 X-RAY EXAM HIP UNI 2-3 VIEWS: CPT

## 2020-09-22 PROCEDURE — 97140 MANUAL THERAPY 1/> REGIONS: CPT

## 2020-09-22 NOTE — FLOWSHEET NOTE
Physical Therapy Daily Treatment Note  Date:  2020    Patient Name:  Travis Barkley    :  1965  MRN: [id number]    Restrictions/Precautions:  Restrictions/Precautions  Restrictions/Precautions: Fall Risk(mod)  Pertinent Medical History: Additional Pertinent Hx: see hx  Medical/Treatment Diagnosis Information:  · Diagnosis: M54.5, G89.29 (ICD-10-CM) - Chronic left-sided low back pain, unspecified whether sciatica present  · Treatment Diagnosis: decreased abilty to ambulate and function  Insurance/Certification information:  PT Insurance Information: [facility]  Physician Information:  Referring Practitioner: Dr. Chin Mccall of care signed (Y/N):routed     Visit# / total visits:   Pain level: 4/10     Functional Outcomes Measure:  [place]-19  Progress Note: []  Yes  []  No  Next due by: Visit #10      History of Injury: Pt is a 51 y/o female with a hx of left back and le pain since 2018. She c/o constant pain in her left lateral le around her hip bursa which is constant and severe at times. She wakes from pain and can't walk right. She cant sleep on her left side. She is a . She hopes to return to normal function and no pain. She is limited with her walking. Subjective:    Pt states, \" I can't walk very far \"   20: Pt states she is more sore today, aggravated it when she stepped down from her car onto some uneven pavement. HEP went well. 20 Pt reports continued pain at left hip, no significant changes with pain thus far.   9/15/20 Pt reports her back is fine, \"it's my left leg that hurts\". 20  I'm doing better than it was before I first came to PT. Pain is less frequent. 20 15 min late     Pt reports increased soreness post last Rx. Overall she does feel improvment with walking    Objective:   See eval  20  Supine to sit RLE lengthens,  SIJ hypomobile with flexion at LS  20  Right Pelvis and greater troch.  Proportionately higher vs left side. .  supine to sit neg. .     test ?+ bilat ( minimal discomfort reported bilat. ) Left ADD brev. TTP. Exercises:  Exercise/Equipment Resistance/Repetitions Other comments   Nu step     Hip flex stretch    Piriformis stretch    clams 20 x  2 LReported feeling tension in left hip   Abd/30     Rectus stretch  Prone   Prone alt knee flexion  3\" 30 x ea    Modified katya stretch to L      Prone alt hip ext   Improved tolerance post scs to left  Add. Prone alt hip ext with knee flex     PPU 20\" x 7     Bridges  10\" x 10                    HEP      4 way hip X 30 ea    Ppt with ball X 30 Reviewed Technique   dktc 30 x 5                Other Therapeutic Activities:      Home Exercise Program:    Access Code: [email or web address]   URL: [facility]/   Date: 09/08/2020   Prepared by: Ezra Shaver     Exercises   Supine Bridge - 10 reps - 1 sets - 10 hold - 1-2x daily - 7x weekly   Prone Press Up - 7 reps - 1 sets - 20 hold - 1-2x daily - 7x weekly    (HOLD as of 9/22/20)    Access Code: [email or web address]   URL: [facility]/   Date: 09/22/2020   Prepared by: Ezra Shaver     Exercises   Standing ITB Stretch - 4 reps - 1 sets - 30 hold - 1x daily - 7x weekly     The patient demonstrated good tolerance to and understanding of the HEP. Written instructions have been issued. Manual Treatments: 25 min  MET        Stretch to Left  rectus fem, ITB and left piriformis    Stretch to L/R rectus fem, Left ITB  SCS to left add. Brevis x 2     mfr to left gluts, piriformis, itb,   Mobs         Modalities:       Progression Towards Functional goals:  [] Patient is progressing as expected towards functional goals listed. [x] Progression is slowed due to complexities listed. [] Progression has been slowed due to co-morbidities. [] Plan just implemented, too soon to assess goals progression  [] Other:    Charges:     Therapeutic Exercise:  [] (39813) Provided verbal/tactile cueing for activities to restore or maintain strength, flexibility, endurance, ROM for improvements with self-care, mobility, lifting and ambulation. Neuromuscular Re-Education  [] (78998) Provided verbal/tactile cueing for activities to restore or maintain balance, coordination, kinesthetic sense, posture, motor skill, proprioception for self-care, mobility, lifting, and ambulation. Therapeutic Activities:    [] (27061) Provided verbal/tactile cueing to address functional limitations related to loss of mobility, strength, balance, and coordination. Gait Training:  [] (48322) Provided training and instruction to the patient for proper postural muscle recruitment and positioning with ambulation re-education     Home Exercise Program:    [] (46531) Reviewed/Progressed HEP activities related to strengthening, flexibility, endurance, ROM for functional self-care, mobility, lifting and ambulation   [] (10377) Reviewed/Progressed HEP activities related to improving balance, coordination, kinesthetic sense, posture, motor skill, proprioception for self-care, mobility, lifting, and ambulation      Manual Treatments:  MFR / STM / Oscillations-Mobs:  G-I, II, III, IV / Manipulation / MLD  [] (91817) Provided manual therapy to mobilize  soft tissue/joints/fluid for the purpose of modulating pain, promoting relaxation, increasing ROM, reducing/eliminating soft tissue swelling/inflammation/restriction, improving soft tissue extensibility and allowing for proper ROM for normal function with self- care, mobility, lifting and ambulation.         Timed Code Treatment Minutes: 35   Total Treatment Minutes: 35     [] EVAL (LOW) 10815   [] EVAL (MOD) 69172   [] EVAL (HIGH) 77680   [] RE-EVAL   [x] TE (81002) x 1    [] Aquatic (84009) x  [] NMR (82298)   x  [] Aquatic Group (04021) x  [x] Manual (33786) x 1   [] Ultrasound (76714) x  [] TA (11014) x  [] Mech Traction (32348)  [] Ionto (20979)           [] ES (un) (37372):   [] Vasopump (49689) [] Other: Assessment  [x] Patient tolerated treatment well [] Patient limited by fatigue  [] Patient limited by pain  [] Patient limited by other medical complications  [] Other:     Prognosis: [x] Good [] Fair  [] Poor    Goals:    Short term goals  Time Frame for Short term goals: 15  Short term goal 1: pt will have full left hip rom to help her meet her goals  Short term goal 2: pt will display improved ns posture to help her meet her goals  Short term goal 3: pt will have 5-/5 strength to help her meet her goals            Patient Requires Follow-up: [x] Yes  [] No    Plan:   [] Continue per plan of care [] Alter current plan (see comments)  [x] Plan of care initiated [] Hold pending MD visit [] Discharge  Note: If patient does not return for scheduled/ recommended follow up visits, this note will serve as a discharge from care along with most recent update on progress. Plan for Next Session:      Check Pubic bone alignment cont.  SCS and stabilize     Pelvic alignment, ns, mfr to left hip muscles, appears to have a peliv malalignment as well as greater troch bursitis, ns exs, hip strength and le stretches, mfr, muscle energy    Electronically signed by:  Marcus Polanco, PT

## 2020-09-24 ENCOUNTER — HOSPITAL ENCOUNTER (OUTPATIENT)
Dept: PHYSICAL THERAPY | Age: 55
Setting detail: THERAPIES SERIES
Discharge: HOME | End: 2020-09-24
Payer: COMMERCIAL

## 2020-09-24 PROCEDURE — 97110 THERAPEUTIC EXERCISES: CPT

## 2020-09-24 PROCEDURE — 97140 MANUAL THERAPY 1/> REGIONS: CPT

## 2020-09-24 NOTE — FLOWSHEET NOTE
Physical Therapy Daily Treatment Note  Date:  2020    Patient Name:  Darius Sánchez    :  1965  MRN: [id number]    Restrictions/Precautions:  Restrictions/Precautions  Restrictions/Precautions: Fall Risk(mod)  Pertinent Medical History: Additional Pertinent Hx: see hx  Medical/Treatment Diagnosis Information:  · Diagnosis: M54.5, G89.29 (ICD-10-CM) - Chronic left-sided low back pain, unspecified whether sciatica present  · Treatment Diagnosis: decreased abilty to ambulate and function  Insurance/Certification information:  PT Insurance Information: [facility]  Physician Information:  Referring Practitioner: Dr. Best Williamson of care signed (Y/N):routed     Visit# / total visits:   Pain level: 2-3/10     Functional Outcomes Measure:  [place]-19  Progress Note: []  Yes  []  No  Next due by: Visit #10      History of Injury: Pt is a 49 y/o female with a hx of left back and le pain since 2018. She c/o constant pain in her left lateral le around her hip bursa which is constant and severe at times. She wakes from pain and can't walk right. She cant sleep on her left side. She is a . She hopes to return to normal function and no pain. She is limited with her walking. Subjective:    Pt states, \" I can't walk very far \"   20: Pt states she is more sore today, aggravated it when she stepped down from her car onto some uneven pavement. HEP went well. 20 Pt reports continued pain at left hip, no significant changes with pain thus far.   9/15/20 Pt reports her back is fine, \"it's my left leg that hurts\". 20  I'm doing better than it was before I first came to PT. Pain is less frequent. 20 15 min late     Pt reports increased soreness post last Rx. Overall she does feel improvment with walking  20 Pt reports the new exercise \"is working for me\". Decreased pain since last rx.      Objective:   See eval  20  Supine to sit RLE lengthens,  SIJ hypomobile with flexion at [facility]  9/17/20  Right Pelvis and greater troch. Proportionately higher vs left side. .  supine to sit neg. .     test ?+ bilat ( minimal discomfort reported bilat. ) Left ADD brev. TTP. Exercises:  Exercise/Equipment Resistance/Repetitions Other comments   Positional ITB stretch in SL'ing 30-60\" stretch x 2  Worked with pt to find best position for stretch   Hip flex stretch    Piriformis stretch    clams 20 x  2 LReported feeling tension in left hip   Abd/30     Rectus stretch  Prone   Prone alt knee flexion  3\" 30 x ea    Modified katya stretch to L      Prone alt hip ext   Improved tolerance post scs to left  Add. Prone alt hip ext with knee flex     PPU 20\" x 7     Bridges  10\" x 10                    HEP      4 way hip X 30 ea    Ppt with ball X 30 Reviewed Technique   dktc 30 x 5                Other Therapeutic Activities:      Home Exercise Program:    Access Code: [email or web address]   URL: [facility]/   Date: 09/08/2020   Prepared by: Xiomarathea Gemma     Exercises   Supine Bridge - 10 reps - 1 sets - 10 hold - 1-2x daily - 7x weekly   Prone Press Up - 7 reps - 1 sets - 20 hold - 1-2x daily - 7x weekly    (HOLD as of 9/22/20)    Access Code: [email or web address]   URL: [facility]/   Date: 09/22/2020   Prepared by: Cynthea Gemma     Exercises   Standing ITB Stretch - 4 reps - 1 sets - 30 hold - 1x daily - 7x weekly   9/24/20 Reviewed/ corrected tech. Access Code: Patricia Evin   URL: [facility]/   Date: 09/24/2020   Prepared by: Cynthea Gemma     Exercises   Sidelying ITB Stretch off Table - 4 reps - 1 sets - 30 hold - 1x daily - 7x weekly     The patient demonstrated good tolerance to and understanding of the HEP. Written instructions have been issued.   Manual Treatments: 25 min  MET        Stretch to Left  rectus fem, ITB and left piriformis    Stretch to L/R rectus fem, Left ITB      mfr to left gluts, piriformis, itb,  Kinesio tape to L TFL  Mobs Modalities:       Progression Towards Functional goals:  [] Patient is progressing as expected towards functional goals listed. [x] Progression is slowed due to complexities listed. [] Progression has been slowed due to co-morbidities. [] Plan just implemented, too soon to assess goals progression  [] Other:    Charges: Therapeutic Exercise:  [] (38863) Provided verbal/tactile cueing for activities to restore or maintain strength, flexibility, endurance, ROM for improvements with self-care, mobility, lifting and ambulation. Neuromuscular Re-Education  [] (27931) Provided verbal/tactile cueing for activities to restore or maintain balance, coordination, kinesthetic sense, posture, motor skill, proprioception for self-care, mobility, lifting, and ambulation. Therapeutic Activities:    [] (64870) Provided verbal/tactile cueing to address functional limitations related to loss of mobility, strength, balance, and coordination.      Gait Training:  [] (89516) Provided training and instruction to the patient for proper postural muscle recruitment and positioning with ambulation re-education     Home Exercise Program:    [] (86480) Reviewed/Progressed HEP activities related to strengthening, flexibility, endurance, ROM for functional self-care, mobility, lifting and ambulation   [] (75338) Reviewed/Progressed HEP activities related to improving balance, coordination, kinesthetic sense, posture, motor skill, proprioception for self-care, mobility, lifting, and ambulation      Manual Treatments:  MFR / STM / Oscillations-Mobs:  G-I, II, III, IV / Manipulation / MLD  [] (12768) Provided manual therapy to mobilize  soft tissue/joints/fluid for the purpose of modulating pain, promoting relaxation, increasing ROM, reducing/eliminating soft tissue swelling/inflammation/restriction, improving soft tissue extensibility and allowing for proper ROM for normal function with self- care, mobility, lifting and

## 2020-09-29 ENCOUNTER — HOSPITAL ENCOUNTER (OUTPATIENT)
Dept: PHYSICAL THERAPY | Age: 55
Setting detail: THERAPIES SERIES
Discharge: HOME | End: 2020-09-29
Payer: COMMERCIAL

## 2020-10-01 ENCOUNTER — HOSPITAL ENCOUNTER (OUTPATIENT)
Dept: PHYSICAL THERAPY | Age: 55
Setting detail: THERAPIES SERIES
Discharge: HOME | End: 2020-10-01
Payer: COMMERCIAL

## 2020-10-01 PROCEDURE — 97140 MANUAL THERAPY 1/> REGIONS: CPT

## 2020-10-01 PROCEDURE — 97110 THERAPEUTIC EXERCISES: CPT

## 2020-10-01 NOTE — FLOWSHEET NOTE
Physical Therapy Daily Treatment Note  Date:  10/1/2020    Patient Name:  Jerman Hernandez    :  1965  MRN: [id number]    Restrictions/Precautions:  Restrictions/Precautions  Restrictions/Precautions: Fall Risk(mod)  Pertinent Medical History: Additional Pertinent Hx: see hx  Medical/Treatment Diagnosis Information:  · Diagnosis: M54.5, G89.29 (ICD-10-CM) - Chronic left-sided low back pain, unspecified whether sciatica present  · Treatment Diagnosis: decreased abilty to ambulate and function  Insurance/Certification information:  PT Insurance Information: [address]  Physician Information:  Referring Practitioner: Dr. iRch Monroe of care signed (Y/N):routed     Visit# / total visits:   Pain level: 2-3/10     Functional Outcomes Measure:  [place]-19  Progress Note: []  Yes  []  No  Next due by: Visit #10      History of Injury: Pt is a 49 y/o female with a hx of left back and le pain since 2018. She c/o constant pain in her left lateral le around her hip bursa which is constant and severe at times. She wakes from pain and can't walk right. She cant sleep on her left side. She is a . She hopes to return to normal function and no pain. She is limited with her walking. Subjective:    Pt states, \" I can't walk very far \"   20: Pt states she is more sore today, aggravated it when she stepped down from her car onto some uneven pavement. HEP went well. 20 Pt reports continued pain at left hip, no significant changes with pain thus far.   9/15/20 Pt reports her back is fine, \"it's my left leg that hurts\". 20  I'm doing better than it was before I first came to PT. Pain is less frequent. 20 15 min late     Pt reports increased soreness post last Rx. Overall she does feel improvment with walking  20 Pt reports the new exercise \"is working for me\".  Decreased pain since last rx.  10/1/20 Pt reports  She no longer has sharp pain at the left hip just Stretch - 2-4 reps - 1 sets - 30 hold - 1x daily - 3-4x weekly   Child's Pose Stretch - 2-4 reps - 1 sets - 30 hold - 1x daily - 3-4x weekly   Child's Pose with Sidebending - 2-4 reps - 1 sets - 30 hold - 1x daily - 3-4x weekly     The patient demonstrated good tolerance to and understanding of the HEP. Written instructions have been issued. Manual Treatments: 20 min  MET        Stretch to Left  rectus fem, ITB and left piriformis    Stretch to L/R rectus fem, Left ITB      mfr to left gluts, piriformis, itb,    Mobs         Modalities:       Progression Towards Functional goals:  [] Patient is progressing as expected towards functional goals listed. [x] Progression is slowed due to complexities listed. [] Progression has been slowed due to co-morbidities. [] Plan just implemented, too soon to assess goals progression  [] Other:    Charges: Therapeutic Exercise:  [] (66026) Provided verbal/tactile cueing for activities to restore or maintain strength, flexibility, endurance, ROM for improvements with self-care, mobility, lifting and ambulation. Neuromuscular Re-Education  [] (57962) Provided verbal/tactile cueing for activities to restore or maintain balance, coordination, kinesthetic sense, posture, motor skill, proprioception for self-care, mobility, lifting, and ambulation. Therapeutic Activities:    [] (42359) Provided verbal/tactile cueing to address functional limitations related to loss of mobility, strength, balance, and coordination.      Gait Training:  [] (36197) Provided training and instruction to the patient for proper postural muscle recruitment and positioning with ambulation re-education     Home Exercise Program:    [] (29400) Reviewed/Progressed HEP activities related to strengthening, flexibility, endurance, ROM for functional self-care, mobility, lifting and ambulation   [] (42652) Reviewed/Progressed HEP activities related to improving balance, coordination, kinesthetic sense, posture, motor skill, proprioception for self-care, mobility, lifting, and ambulation      Manual Treatments:  MFR / STM / Oscillations-Mobs:  G-I, II, III, IV / Manipulation / MLD  [] (01989) Provided manual therapy to mobilize  soft tissue/joints/fluid for the purpose of modulating pain, promoting relaxation, increasing ROM, reducing/eliminating soft tissue swelling/inflammation/restriction, improving soft tissue extensibility and allowing for proper ROM for normal function with self- care, mobility, lifting and ambulation. Timed Code Treatment Minutes: 42   Total Treatment Minutes: 45     [] EVAL (LOW) 63223   [] EVAL (MOD) 99127   [] EVAL (HIGH) 49045   [] RE-EVAL   [x] TE (38788) x 2   [] Aquatic (66599) x  [] NMR (17708)   x  [] Aquatic Group (94517) x  [x] Manual (49584) x 1   [] Ultrasound (72315) x  [] TA (17550) x  [] Mech Traction (96802)  [] Ionto (70427)           [] ES (un) (66058):   [] Vasopump (76458) [] Other:      Assessment  [x] Patient tolerated treatment well [] Patient limited by fatigue  [] Patient limited by pain  [] Patient limited by other medical complications  [] Other:     Prognosis: [x] Good [] Fair  [] Poor    Goals:    Short term goals  Time Frame for Short term goals: 15  Short term goal 1: pt will have full left hip rom to help her meet her goals  Short term goal 2: pt will display improved ns posture to help her meet her goals  Short term goal 3: pt will have 5-/5 strength to help her meet her goals            Patient Requires Follow-up: [x] Yes  [] No    Plan:   [] Continue per plan of care [] Alter current plan (see comments)  [x] Plan of care initiated [] Hold pending MD visit [] Discharge  Note: If patient does not return for scheduled/ recommended follow up visits, this note will serve as a discharge from care along with most recent update on progress. Plan for Next Session:     Build hip strength, hip mobs and DTM to TFL/ ITB   Check Pubic bone alignment cont.  SCS and stabilize   Last rx 10/15/20      Electronically signed by:  Jorge Roberts, PT

## 2020-10-05 ENCOUNTER — TELEPHONE (OUTPATIENT)
Dept: FAMILY MEDICINE CLINIC | Age: 55
End: 2020-10-05

## 2020-10-05 ENCOUNTER — VIRTUAL VISIT (OUTPATIENT)
Dept: FAMILY MEDICINE CLINIC | Age: 55
End: 2020-10-05
Payer: COMMERCIAL

## 2020-10-05 PROCEDURE — 99214 OFFICE O/P EST MOD 30 MIN: CPT | Performed by: FAMILY MEDICINE

## 2020-10-05 RX ORDER — PREDNISONE 10 MG/1
TABLET ORAL
Qty: 30 TABLET | Refills: 0 | Status: SHIPPED | OUTPATIENT
Start: 2020-10-05 | End: 2021-03-01 | Stop reason: CLARIF

## 2020-10-05 NOTE — TELEPHONE ENCOUNTER
Patient needs a refill on her medication Prednisone 10 mg. [address] on  [address] [address] [facility] [address]. Phone no. [phone]    She is using her inhaler but she is still having SOB.

## 2020-10-05 NOTE — PROGRESS NOTES
Subjective:      Patient ID: Jerman Hernandez is a 50 y.o. female. Chief Complaint   Patient presents with    Shortness of Breath     PT C/O SOB THAT STARTED YESTERDAY, USING INHALER BUT STILL HAVING TIGHTNESS IN HER CHEST. PT ASKING FOR A REFILL ON HER PREDNISONE   89  HPI  AGE  Then Asthma  Started with vomiting and diarrhea Thursday evening. Subsided. Then she got shortness of breath and was using her inhaler but still with chest tightness. Diagnosed with asthma 2010 2011. She was able to get her fluids. She got dehydrated. When she is sick and gets under the weather her asthma flares up. Then it settles down. She is using inhaler 2-3x/day. Has never used a steroid MDI. She can walk far with out SOB. Can climb stairs. No chest tightness with that today. Past Medical History:   Diagnosis Date    Anemia     iron defieciency    Asthma     Brain tumor ([facility] [address]     surgery for brain tumor 3/15/18    Hemorrhagic cyst of ovary 2007    Left    Hyperlipidemia     Tubular adenoma of colon 03/02/2018    COLONOSCOPY, DR CLAUDINE KHAN, TUBULAR ADENOMAS ASCENDING COLON, 3 MM, 4 MM, 5 MM REMOVED. Past Surgical History:   Procedure Laterality Date    COLONOSCOPY  03/02/2018    COLONOSCOPY, DR CLAUDINE KHAN, 3 MM, 4 MM, 5 MM ASCENDING COLON POLYP REMOVED. LIMITED PREP. REPEAT 3-6 MONTHS.    [address]  10/30/2017    with mesh     OTHER SURGICAL HISTORY Left     lump removed from under left arm pit in her 31's    TUBAL LIGATION Bilateral 9176    UMBILICAL HERNIA REPAIR  12/06/2016    and umbilectomy       Social History     Socioeconomic History    Marital status:      Spouse name: Not on file    Number of children: Not on file    Years of education: Not on file    Highest education level: Not on file   Occupational History    Not on file   Social Needs    Financial resource strain: Not on file    Food insecurity     Worry: Not on file     Inability: Not on file   [place]on Sicard 2 tabs x2d 1 tab x 2 d in am with food. No NSAIDS while taking (Patient not taking: Reported on 10/5/2020) 30 tablet 0     No current facility-administered medications for this visit. Review of Systems      Objective:   Physical Exam  Patient-Reported Vitals 10/5/2020   Patient-Reported Weight 159   Patient-Reported Height 5' 8\"      BP Readings from Last 3 Encounters:   02/28/20 120/76   01/14/20 110/70   08/23/19 124/66     Pulse Readings from Last 3 Encounters:   02/28/20 70   01/14/20 58   08/23/19 74     Wt Readings from Last 3 Encounters:   02/28/20 152 lb 6.4 oz (69.1 kg)   01/14/20 156 lb (70.8 kg)   08/23/19 155 lb 12.8 oz (70.7 kg)      Assessment:      1. Mild intermittent asthma with acute exacerbation    2. Viral gastroenteritis    3. Encounter by telehealth for suspected COVID-19          Plan:    Phone [phone]    Patient Instructions     Bryn Partida was seen today for shortness of breath. Diagnoses and all orders for this visit:    Mild intermittent asthma with acute exacerbation  -     predniSONE (DELTASONE) 10 MG tablet; 4 tabs x2 d 3 tabs x 2 d 2 tabs x2d 1 tab x 2 d in am with food. No NSAIDS while taking  The trigger for this asthma worsening could be the same virus that caused the stomach flu. If you are not getting better please call us back. If you at any point feel this is turned into an infection and have a fever you should call us back immediately whether you believe it is COVID-19 or any other virus or a bacterial bronchitis. Immediately start the regimen for treatment of respiratory infections below and call us also. Viral gastroenteritis (the stomach flu)  TREATMENT FOR FUTURE REFERENCE:  Clear liquids for 24 hrs ie gatorade, water. Ginger ale or ginger tea may help with nausea and vomiting. Then BRAT (Bananas/ Rice/ Applesauce/ Toast) diet and clear liquids  for another 24 hrs, then 3-5 days of no sweets, dairy and low fat diet.   Will call in ondansetron (Zofran) OR phenergan (promethazine) for the vomiting if desired. Use Tylenol (Acetaminophen) if pain or fevers NOT aspirin/ Ibuprofen/ Aleve. Be seen if worsening symptoms or abdominal pain. IF PERSISTENT DIARRHEA  Adding elements of the BRAT diet (Bananas/ Rice/ Applesauce/ Toast) diet will help slow diarrhea. Make sure you are drinking plenty of fluids with potasium such as sports drinks or diluted low sugar juices, water and other caffeine-free clear liquids until you feel better and enough so that your urine is light yellow or clear like water. If you have kidney, heart, or liver disease and have to limit fluids, ask us before you increase the amount of fluids you drink. Use Tylenol if having pain or fevers NOT aspirin/ Ibuprofen/ Aleve. Be seen if worsening symptoms or abdominal pain or bloody stools. Watch for signs of dehydration, which means your body has lost too much water. Dehydration is a serious condition and should be treated right away. Signs of dehydration are:    Increasing thirst and dry eyes and mouth.  Feeling faint or lightheaded.  Darker urine, and a smaller amount of urine than normal.  Things to avoid.  Avoid spicy foods, fruits, alcohol, and caffeine until 48 hours after all symptoms are gone.  Avoid chewing gum that contains sorbitol.  Avoid dairy products (except for yogurt with Lactobacillus) while you have diarrhea and for 3 days after symptoms are gone. Avoid sweets and eat a low fat diet. Stop fish oil and other oil supplements. Zinc pill (not lozenges) 50 mg twice daily WITH FOOD taper 1/2 pill twice daily as stools firm and then continue to taper as stools become normal.   Acidophilus pills/caps/chews 3x/day. (Probiotics: Align or others which have multiple different good bacteria in them.)  Vitamin C 500 mg twice daily. If you take you metformin for diabetes make sure it is taken with carbs/starches.  If diarrhea persists on metformin call for an alternate medicine until diarrhea resolves. You can take over-the-counter medicine, such as loperamide (Imodium), if you still have diarrhea after 6 hours. Read and follow all instructions on the label. Do not use this medicine if you have bloody diarrhea, a high fever, or other signs of serious illness. Call your doctor if you think you are having a problem with your medicine. Take your temperature if you are feeling hot. If diarrhea persists we will take stool samples for:  Stool WBCs, Stool cultures, O&P may be needed. Encounter by telehealth for suspected COVID-19  -     COVID-19 Ambulatory; Future  The risk is likely to increase for COVID-19 infection as the reopening occurs. This is because while some people are being careful with social distancing masks and social isolation other people are totally ignoring it. Preventing the Spread of Coronavirus Disease 2019 in Homes and Residential Communities   For the most recent information go to [email or web address]    Prevention steps for People with confirmed or suspected COVID-19 (including persons under investigation) who do not need to be hospitalized  and   People with confirmed COVID-19 who were hospitalized and determined to be medically stable to go home    Your healthcare provider and public health staff will evaluate whether you can be cared for at home. If it is determined that you do not need to be hospitalized and can be isolated at home, you will be monitored by staff from your local or state health department. You should follow the prevention steps below until a healthcare provider or local or state health department says you can return to your normal activities. Stay home except to get medical care  People who are mildly ill with COVID-19 are able to isolate at home during their illness. You should restrict activities outside your home, except for getting medical care.  Do not go to work, school, or public areas. Avoid using public transportation, ride-sharing, or taxis. Separate yourself from other people and animals in your home  People: As much as possible, you should stay in a specific room and away from other people in your home. Also, you should use a separate bathroom, if available. Animals: You should restrict contact with pets and other animals while you are sick with COVID-19, just like you would around other people. Although there have not been reports of pets or other animals becoming sick with COVID-19, it is still recommended that people sick with COVID-19 limit contact with animals until more information is known about the virus. When possible, have another member of your household care for your animals while you are sick. If you are sick with COVID-19, avoid contact with your pet, including petting, snuggling, being kissed or licked, and sharing food. If you must care for your pet or be around animals while you are sick, wash your hands before and after you interact with pets and wear a facemask. Call ahead before visiting your doctor  If you have a medical appointment, call the healthcare provider and tell them that you have or may have COVID-19. This will help the healthcare providers office take steps to keep other people from getting infected or exposed. Wear a facemask  You should wear a facemask when you are around other people (e.g., sharing a room or vehicle) or pets and before you enter a healthcare providers office. If you are not able to wear a facemask (for example, because it causes trouble breathing), then people who live with you should not stay in the same room with you, or they should wear a facemask if they enter your room. Cover your coughs and sneezes  Cover your mouth and nose with a tissue when you cough or sneeze. Throw used tissues in a lined trash can.  Immediately wash your hands with soap and water for at least 20 seconds or, if soap and water department. Persons who are placed under active monitoring or facilitated self-monitoring should follow instructions provided by their local health department or occupational health professionals, as appropriate. When working with your local health department check their available hours. If you have a medical emergency and need to call 911, notify the dispatch personnel that you have, or are being evaluated for COVID-19. If possible, put on a facemask before emergency medical services arrive. Discontinuing home isolation  Patients with confirmed COVID-19 should remain under home isolation precautions until the risk of secondary transmission to others is thought to be low. The decision to discontinue home isolation precautions should be made on a case-by-case basis, in consultation with healthcare providers and state and local health departments. Use Tylenol NOT Ibuprofen/Aleve/aspirin for pain or fevers. There is a theoretical decrease in the body's ability to fight the virus when you are infected if you are on NSAIDs. The FDA has said that this information is not yet proven. The newest evidence suggest that wearing a mask in public may be beneficial if you remember that the outside of it is contaminated and treat it accordingly. It also helps prevent spread if someone has an asymptomatic or presymptomatic case and does not know it yet. Even cloth masks can be beneficial.    Advance Care Planning  People with COVID-19 may have no symptoms, mild symptoms, such as fever, cough, and shortness of breath or they may have more severe illness, developing severe and fatal pneumonia. As a result, Advance Care Planning with attention to naming a health care decision maker (someone you trust to make healthcare decisions for you if you could not speak for yourself) and sharing other health care preferences is important BEFORE a possible health crisis.  Please contact your Primary Care Provider to discuss Advance Care Planning. Vitamin D by mouth and vitamin C intravenously are being used as part of the treatment regimen for COVID-19 in some hospitalized patients. You have a vitamin D deficiency. Your medication list does not show that you are taking vitamin D.   Ref. Range 3/14/2014 10:09   Vit D, 25-Hydroxy 30 - 100 ng/mL 14 (L)   A low level of vitamin D is associated with: Increased risk of death from cardiovascular disease, cognitive impairment in older adults, severe asthma in children, cancer especially breast and colon. Vitamin D may also have a role in treatment of diabetes and prediabetes, high blood pressure, psoriasis and multiple sclerosis. Vitamin D deficiency weakens bones leading to rickets in children and osteoporosis in adults, increases risk of infections, and leads to more skin problems. The primary symptoms of deficiency are often muscle aches and weakness as well as fatigue often mistaken for natural aging.  (400 IU = 10 mcg, 1000 IU = 25 mcg, 4000 IU = 100 mcg, 5000 IU = 125 mcg)  Recommend vitamin D 5000 IU daily for 1 year and then recheck level and you may only need 3-5 doses a week. Also start vitamin C 500 mg daily. Both of these should be continued for the duration of the moderate risk portion of the COVID-19 pandemic expected to be 2 years according to the [employer]. Continue wearing a mask when around people except those living in the same house who are not infected with or heavily exposed to COVID-19, handwashing/hand gel use, social distancing, and social isolation for nonessential activities. IF you develop ANY respiratory infection symptoms (not just allergy symptoms) suggestive of COVID-19 start the recommendations below:                                            Instructions for Respiratory Infections (SAVE THIS SHEET)    For the first 7-14 days of symptoms follow instructions below, even before being seen in the office or even during treatment with antibiotics, until symptom free. 1. Water: Drink 1 ounce of water for every 2 pounds of body weight for adults, you need 70 ounces of water/fluids per day. This will loosen mucus in the head and chest & improve the weak feeling of dehydration, allow the body to get germ fighting resources to the infection. Half of fluids can be juice or sugar free Crystal Light. Don't count drinks with caffeine, alcohol or carbonation. Infants can have Pedialyte liquid or freezer pops. Avoid salt and sports drinks if you have high Blood Pressure, swelling in the feet or ankles or have heart problems. 2. Humidity: Humidify the air to 35-50% ( or until the windows fog over slightly). Can use a humidifier, vaporizer, boil water on the stove or put a coffee can full of water on the heater vents. This will loosen mucus from infections and allergies. 3. Sleep: Get 8-10 hours a night and rest during the evening after work or school. If you have trouble sleeping, adults can take Melatonin 5mg up to 2 tabs at bedtime ( not for children or pregnant women). If Mono is suspected then sleep during 9PM to 9AM time span (if possible.)  4. Cough: Take cough medicines with Guaifenesin ( to loosen chest or head congestion) and Dextromethorphan ( to decrease excess cough). Robitussin D.M. Syrup every 4-6 hrs OR Mucinex D. M. pills OR Delsym DM syrup twice a day. Use the pediatric formulations for children over 6 months making sure they are alcohol & sugar free for children, pregnant women, and diabetics. 5. Pain And Fevers: Take Acetaminophen ( Tylenol) for fevers, aches, and headaches. 2-500 mg every 8 hours for adults. Appropriate doses at bedtime for children may help them sleep better. If pregnant take 1 -500 mg (Tylenol) every 8 hours as needed.  Ibuprofen/Aleve/aspirin for pain and fevers SHOULD NOT BE USED IN THE SETTING OF POSSIBLE COVID-19 viral infection NOR if pregnant, if you have acid reflux, high blood pressure, CHF, or kidney problems. 6.Gargle: (DAY ONE OF SYMPTOMS) Gargle in the back of the throat with the head tilted back and to the sides with a strong mouthwash  ( Listerine or Scope) after meals and at bedtime at least 4 -5 times a day. This helps kill bacteria and viruses in the back of the throat and will shorten the duration and decrease the severity of your symptoms: sore throat, cough, ear popping,/ear pain, and possibly dizziness. 7. Smoking: Avoid smoking or exposure to second hand smoke. 8. Zinc: (DAY ONE OF SYMPTOMS)  Zinc lozenges such as Cold Boris (available most stores), or Basic ([employer] brand) will help shorten the duration and lessen symptoms such as sore throat, cough, nasal congestion, runny nose, and post nasal drip. Use 1 lozenge every 2-4 hours ( after meals if stomach is sensitive). Children can use 10-15 mg or less 3-4 times a day or Zinc lollypops. In pregnancy limit to 50-60 mg a day for 7 days as prenatals have Zinc also. With diarrhea use zinc pills 50 mg 1/2 to 1 pill 2x/day. 9. Vitamins: Vitamin C 500 mg with breakfast and dinner (with suspected or diagnosed COVID-19 infection take vitamin C 1000 mg 2-3 times a day). Children and pregnant women should drink citrus juices. This speeds healing and strengthens immune system. 10. Chest Symptoms: Vicks Vapor rub to the chest at bedtime. 11. Decongestants: Avoid all decongestants and antihistamine cold preparations in children. Decongestants should always be avoided in people with high blood pressure, palpitations, heart disease and those on stimulant medications. Antihistamines may impede the body's ability to fight COVID-19.  12. Frequent hand washing and/or hand gel especially after coughing or sneezing into the hands or blowing the nose to help prevent spreading to others. Use kleenex and NOT handkerchiefs. Try all of the above starting with day 1 of symptoms.  If Strep throat symptoms appear call to be seen in the office as soon as possible and don't gargle on that day. Newborns, infants, or anyone with earaches or influenza may need to be seen quickly. Adults with fevers over 103 degrees or shortness of breath should call the office immediately. Damian Gitelman is a 50 y.o. female evaluated via telephone on 10/5/2020. Consent:  She and/or health care decision maker is aware that that she may receive a bill for this telephone service, depending on her insurance coverage, and has provided verbal consent to proceed: Yes    Documentation:  I communicated with the patient and/or health care decision maker about issues above. Details of this discussion including any medical advice provided: Information above. I affirm this is a Patient Initiated Episode with a Patient who has not had a related appointment within my department in the past 7 days or scheduled within the next 24 hours. Patient identification was verified at the start of the visit: Yes    Total Time: 25 minutes of which more than one half is spent in counseling.     Note: not billable if this call serves to triage the patient into an appointment for the relevant concern       Shabana Hearn DO

## 2020-10-06 ENCOUNTER — HOSPITAL ENCOUNTER (OUTPATIENT)
Dept: PHYSICAL THERAPY | Age: 55
Setting detail: THERAPIES SERIES
Discharge: HOME | End: 2020-10-06
Payer: COMMERCIAL

## 2020-10-06 PROCEDURE — 97110 THERAPEUTIC EXERCISES: CPT

## 2020-10-06 PROCEDURE — 97140 MANUAL THERAPY 1/> REGIONS: CPT

## 2020-10-06 NOTE — FLOWSHEET NOTE
Physical Therapy Daily Treatment Note  Date:  10/6/2020    Patient Name:  Calvin Pelletier    :  1965  MRN: [id number]    Restrictions/Precautions:  Restrictions/Precautions  Restrictions/Precautions: Fall Risk(mod)  Pertinent Medical History: Additional Pertinent Hx: see hx  Medical/Treatment Diagnosis Information:  · Diagnosis: M54.5, G89.29 (ICD-10-CM) - Chronic left-sided low back pain, unspecified whether sciatica present  · Treatment Diagnosis: decreased abilty to ambulate and function  Insurance/Certification information:  PT Insurance Information: Sadaf Centeno  Physician Information:  Referring Practitioner: Dr. Herbert Funes of care signed (Y/N):routed     Visit# / total visits:   Pain level: 0/10     Functional Outcomes Measure:  [place]-19  Progress Note: []  Yes  []  No  Next due by: Visit #10      History of Injury: Pt is a 51 y/o female with a hx of left back and le pain since 2018. She c/o constant pain in her left lateral le around her hip bursa which is constant and severe at times. She wakes from pain and can't walk right. She cant sleep on her left side. She is a . She hopes to return to normal function and no pain. She is limited with her walking. Subjective:    Pt states, \" I can't walk very far \"   20: Pt states she is more sore today, aggravated it when she stepped down from her car onto some uneven pavement. HEP went well. 20 Pt reports continued pain at left hip, no significant changes with pain thus far.   9/15/20 Pt reports her back is fine, \"it's my left leg that hurts\". 20  I'm doing better than it was before I first came to PT. Pain is less frequent. 20 15 min late     Pt reports increased soreness post last Rx. Overall she does feel improvment with walking  20 Pt reports the new exercise \"is working for me\".  Decreased pain since last rx.  10/1/20 Pt reports  She no longer has sharp pain at the left hip just soreness. 10/6/20 Pt reports she has less pain today, last Rx helped has no pain today. Objective:   See eval  9/8/20  Supine to sit RLE lengthens,  SIJ hypomobile with flexion at LS  9/17/20  Right Pelvis and greater troch. Proportionately higher vs left side. .  supine to sit neg. .     test ?+ bilat ( minimal discomfort reported bilat. ) Left ADD brev. TTP. Exercises:  Exercise/Equipment Resistance/Repetitions Other comments   Positional ITB stretch in SL'ing 30-60\" stretch x 2  Worked with pt to find best position for stretch   Hip flex stretch        Clams vs T band  20 x  2 LReported feeling tension in left hip   Lateral stepping vs T band  15'  X 5 L/R ea         Rectus stretch  Prone   Prone alt knee flexion      Modified katya stretch to L      Prone alt hip ext   Improved tolerance post scs to left  Add. Bridges   ADD TO HEP   rj pose 3 way     Standing pulley LLE  abd  Add   ext   2k 20 x  2k 20 x  2k 20 x   Pt reported right hip stiffness post ex        HEP      4 way hip X 30 ea    Ppt with ball X 30 Reviewed Technique   dktc 30 x 5                Other Therapeutic Activities:      Home Exercise Program:    Access Code: [email or web address]   URL: [employer]/   Date: 09/08/2020   Prepared by: Madelaine Aguilera     Exercises   Supine Bridge - 10 reps - 1 sets - 10 hold - 1-2x daily - 7x weekly   Prone Press Up - 7 reps - 1 sets - 20 hold - 1-2x daily - 7x weekly    (HOLD as of 9/22/20)    Access Code: [email or web address]   URL: [employer]/   Date: 09/22/2020   Prepared by: Madelaine Aguilera     Exercises   Standing ITB Stretch - 4 reps - 1 sets - 30 hold - 1x daily - 7x weekly   9/24/20 Reviewed/ corrected tech. Access Code: Yola Orozco   URL: [employer]/   Date: 09/24/2020   Prepared by: Madelaine Aguilera     Exercises   Sidelying ITB Stretch off Table - 4 reps - 1 sets - 30 hold - 1x daily - 7x weekly   Access Code: [email or web address]   URL: [employer]/ Date: 10/01/2020   Prepared by: Temo Church   Considerable time taken to teach technique of this the following exercises. Exercises   Quadruped Piriformis Stretch - 2-4 reps - 1 sets - 30 hold - 1x daily - 3-4x weekly   Child's Pose Stretch - 2-4 reps - 1 sets - 30 hold - 1x daily - 3-4x weekly   Child's Pose with Sidebending - 2-4 reps - 1 sets - 30 hold - 1x daily - 3-4x weekly   Access Code: [email or web address]   URL: [employer]/   Date: 10/06/2020   Prepared by: Phil Uma with Resistance - 10 reps - 4-6 sets - 2 hold - 1x daily - 3-4x weekly     The patient demonstrated good tolerance to and understanding of the HEP. Written instructions have been issued. Manual Treatments: 25 min  DTM/ deep friction to Left TFL/ ITB   Stretch to Left  rectus fem, ITB and left piriformis    mfr to left gluts, piriformis, itb,    Mobs       MET            Modalities:       Progression Towards Functional goals:  [] Patient is progressing as expected towards functional goals listed. [x] Progression is slowed due to complexities listed. [] Progression has been slowed due to co-morbidities. [] Plan just implemented, too soon to assess goals progression  [] Other:    Charges: Therapeutic Exercise:  [] (69408) Provided verbal/tactile cueing for activities to restore or maintain strength, flexibility, endurance, ROM for improvements with self-care, mobility, lifting and ambulation. Neuromuscular Re-Education  [] (17992) Provided verbal/tactile cueing for activities to restore or maintain balance, coordination, kinesthetic sense, posture, motor skill, proprioception for self-care, mobility, lifting, and ambulation. Therapeutic Activities:    [] (31091) Provided verbal/tactile cueing to address functional limitations related to loss of mobility, strength, balance, and coordination.      Gait Training:  [] (21700) Provided training and instruction to the patient for proper postural muscle recruitment and positioning with ambulation re-education     Home Exercise Program:    [] (48193) Reviewed/Progressed HEP activities related to strengthening, flexibility, endurance, ROM for functional self-care, mobility, lifting and ambulation   [] (73202) Reviewed/Progressed HEP activities related to improving balance, coordination, kinesthetic sense, posture, motor skill, proprioception for self-care, mobility, lifting, and ambulation      Manual Treatments:  MFR / STM / Oscillations-Mobs:  G-I, II, III, IV / Manipulation / MLD  [] (26253) Provided manual therapy to mobilize  soft tissue/joints/fluid for the purpose of modulating pain, promoting relaxation, increasing ROM, reducing/eliminating soft tissue swelling/inflammation/restriction, improving soft tissue extensibility and allowing for proper ROM for normal function with self- care, mobility, lifting and ambulation.         Timed Code Treatment Minutes: 42   Total Treatment Minutes: 45     [] EVAL (LOW) 89361   [] EVAL (MOD) 14633   [] EVAL (HIGH) 80210   [] RE-EVAL   [x] TE (74671) x 1   [] Aquatic (15625) x  [] NMR (19970)   x  [] Aquatic Group (20185) x  [x] Manual (73044) x 2   [] Ultrasound (29514) x  [] TA (74669) x  [] Mech Traction (12333)  [] Ionto (50539)           [] ES (un) (99325):   [] Vasopump (01297) [] Other:      Assessment  [x] Patient tolerated treatment well [] Patient limited by fatigue  [] Patient limited by pain  [] Patient limited by other medical complications  [] Other:     Prognosis: [x] Good [] Fair  [] Poor    Goals:    Short term goals  Time Frame for Short term goals: 15  Short term goal 1: pt will have full left hip rom to help her meet her goals  Short term goal 2: pt will display improved ns posture to help her meet her goals  Short term goal 3: pt will have 5-/5 strength to help her meet her goals            Patient Requires Follow-up: [x] Yes  [] No    Plan:   [] Continue per plan of care [] Alter current plan (see comments)  [x] Plan of care initiated [] Hold pending MD visit [] Discharge  Note: If patient does not return for scheduled/ recommended follow up visits, this note will serve as a discharge from care along with most recent update on progress.     Plan for Next Session:     Resume Bridge  And add bridge with hip abd to thera ex   Last rx 10/15/20      Electronically signed by:  Ida Maier PT

## 2020-10-08 ENCOUNTER — OFFICE VISIT (OUTPATIENT)
Dept: PRIMARY CARE CLINIC | Age: 55
End: 2020-10-08
Payer: COMMERCIAL

## 2020-10-08 ENCOUNTER — HOSPITAL ENCOUNTER (OUTPATIENT)
Dept: PHYSICAL THERAPY | Age: 55
Setting detail: THERAPIES SERIES
Discharge: HOME | End: 2020-10-08
Payer: COMMERCIAL

## 2020-10-08 PROCEDURE — 97140 MANUAL THERAPY 1/> REGIONS: CPT

## 2020-10-08 PROCEDURE — 99211 OFF/OP EST MAY X REQ PHY/QHP: CPT | Performed by: NURSE PRACTITIONER

## 2020-10-08 NOTE — PROGRESS NOTES
Anastasia Liana received a viral test for COVID-19. They were educated on isolation and quarantine as appropriate. For any symptoms, they were directed to seek care from their PCP, given contact information to establish with a doctor, directed to an urgent care or the emergency room.

## 2020-10-08 NOTE — FLOWSHEET NOTE
soreness. 10/6/20 Pt reports she has less pain today, last Rx helped has no pain today. 10/8/20 Pt reports no pain today. She indicated she would like to finish out her sessions. (needs to leave early today for other medical testing)    Objective:   See eval  9/8/20  Supine to sit RLE lengthens,  SIJ hypomobile with flexion at LS  9/17/20  Right Pelvis and greater troch. Proportionately higher vs left side. .  supine to sit neg. .     test ?+ bilat ( minimal discomfort reported bilat. ) Left ADD brev. TTP. Exercises:  Exercise/Equipment Resistance/Repetitions Other comments   Positional ITB stretch in SL'ing 30-60\" stretch x 2  Worked with pt to find best position for stretch   Hip flex stretch        Clams vs T band  Reported feeling tension in left hip   Lateral stepping vs T band          Rectus stretch  Prone   Prone alt knee flexion      Modified katya stretch to L      Prone alt hip ext   Improved tolerance post scs to left  Add. Bridges   ADD TO HEP   rj pose 3 way     Standing pulley LLE  abd  Add   ext      Pt reported right hip stiffness post ex        HEP      4 way hip X 30 ea    Ppt with ball X 30 Reviewed Technique   dktc 30 x 5                Other Therapeutic Activities:      Home Exercise Program:    Access Code: [email or web address]   URL: [employer]/   Date: 09/08/2020   Prepared by: Araceli Garnett     Exercises   Supine Bridge - 10 reps - 1 sets - 10 hold - 1-2x daily - 7x weekly   Prone Press Up - 7 reps - 1 sets - 20 hold - 1-2x daily - 7x weekly    (HOLD as of 9/22/20)    Access Code: [email or web address]   URL: [email or web address]/   Date: 09/22/2020   Prepared by: Araceli Garnett     Exercises   Standing ITB Stretch - 4 reps - 1 sets - 30 hold - 1x daily - 7x weekly   9/24/20 Reviewed/ corrected tech. Access Code: Betsy Grigsby   URL: [employer]/   Date: 09/24/2020   Prepared by: Araceli Garnett     Exercises   Sidelying ITB Stretch off Table - 4 reps - 1 Training:  [] (19368) Provided training and instruction to the patient for proper postural muscle recruitment and positioning with ambulation re-education     Home Exercise Program:    [] (53194) Reviewed/Progressed HEP activities related to strengthening, flexibility, endurance, ROM for functional self-care, mobility, lifting and ambulation   [] (26761) Reviewed/Progressed HEP activities related to improving balance, coordination, kinesthetic sense, posture, motor skill, proprioception for self-care, mobility, lifting, and ambulation      Manual Treatments:  MFR / STM / Oscillations-Mobs:  G-I, II, III, IV / Manipulation / MLD  [] (70836) Provided manual therapy to mobilize  soft tissue/joints/fluid for the purpose of modulating pain, promoting relaxation, increasing ROM, reducing/eliminating soft tissue swelling/inflammation/restriction, improving soft tissue extensibility and allowing for proper ROM for normal function with self- care, mobility, lifting and ambulation.         Timed Code Treatment Minutes: 25   Total Treatment Minutes: 28     [] EVAL (LOW) 78210   [] EVAL (MOD) 94997   [] EVAL (HIGH) 05828   [] RE-EVAL   [] TE (96707) x   [] Aquatic (05001) x  [] NMR (52045)   x  [] Aquatic Group (21849) x  [x] Manual (03539) x 2   [] Ultrasound (77968) x  [] TA (18167) x  [] Mech Traction (98668)  [] Ionto (05794)           [] ES (un) (42648):   [] Vasopump (17484) [] Other:      Assessment  [x] Patient tolerated treatment well [] Patient limited by fatigue  [] Patient limited by pain  [] Patient limited by other medical complications  [] Other:     Prognosis: [x] Good [] Fair  [] Poor    Goals:    Short term goals  Time Frame for Short term goals: 15  Short term goal 1: pt will have full left hip rom to help her meet her goals  Short term goal 2: pt will display improved ns posture to help her meet her goals  Short term goal 3: pt will have 5-/5 strength to help her meet her goals            Patient Requires Follow-up: [x] Yes  [] No    Plan:   [] Continue per plan of care [] Alter current plan (see comments)  [x] Plan of care initiated [] Hold pending MD visit [] Discharge  Note: If patient does not return for scheduled/ recommended follow up visits, this note will serve as a discharge from care along with most recent update on progress.     Plan for Next Session:   Reassess   Resume thera ex/ Bridge  And add bridge with hip abd to thera ex   Last rx 10/15/20      Electronically signed by:  Benjamin Hood PT

## 2020-10-08 NOTE — PATIENT INSTRUCTIONS

## 2020-10-10 LAB — SARS-COV-2, NAA: NOT DETECTED

## 2020-10-13 ENCOUNTER — HOSPITAL ENCOUNTER (OUTPATIENT)
Dept: PHYSICAL THERAPY | Age: 55
Setting detail: THERAPIES SERIES
Discharge: HOME | End: 2020-10-13
Payer: COMMERCIAL

## 2020-10-13 PROCEDURE — 97110 THERAPEUTIC EXERCISES: CPT

## 2020-10-13 PROCEDURE — 97140 MANUAL THERAPY 1/> REGIONS: CPT

## 2020-10-13 NOTE — FLOWSHEET NOTE
Physical Therapy Daily Treatment Note  Date:  10/13/2020    Patient Name:  Morenita Zavala    :  1965  MRN: [id number]    Restrictions/Precautions:  Restrictions/Precautions  Restrictions/Precautions: Fall Risk(mod)  Pertinent Medical History: Additional Pertinent Hx: see hx  Medical/Treatment Diagnosis Information:  · Diagnosis: M54.5, G89.29 (ICD-10-CM) - Chronic left-sided low back pain, unspecified whether sciatica present  · Treatment Diagnosis: decreased abilty to ambulate and function  Insurance/Certification information:  PT Insurance Information: Viola Narayanan  Physician Information:  Referring Practitioner: Dr. Ace Hall of care signed (Y/N):routed     Visit# / total visits:   Pain level: 0/10     Functional Outcomes Measure:  [place]-19  Progress Note: []  Yes  []  No  Next due by: Visit #10      History of Injury: Pt is a 49 y/o female with a hx of left back and le pain since 2018. She c/o constant pain in her left lateral le around her hip bursa which is constant and severe at times. She wakes from pain and can't walk right. She cant sleep on her left side. She is a . She hopes to return to normal function and no pain. She is limited with her walking. Subjective:    Pt states, \" I can't walk very far \"   20: Pt states she is more sore today, aggravated it when she stepped down from her car onto some uneven pavement. HEP went well. 20 Pt reports continued pain at left hip, no significant changes with pain thus far.   9/15/20 Pt reports her back is fine, \"it's my left leg that hurts\". 20  I'm doing better than it was before I first came to PT. Pain is less frequent. 20 15 min late     Pt reports increased soreness post last Rx. Overall she does feel improvment with walking  20 Pt reports the new exercise \"is working for me\".  Decreased pain since last rx.  10/1/20 Pt reports  She no longer has sharp pain at the left hip just soreness. 10/6/20 Pt reports she has less pain today, last Rx helped has no pain today. 10/8/20 Pt reports no pain today. She indicated she would like to finish out her sessions. (needs to leave early today for other medical testing)  10/13/20 Pt reports no hip pain today. Objective:   See eval  9/8/20  Supine to sit RLE lengthens,  SIJ hypomobile with flexion at LS  9/17/20  Right Pelvis and greater troch. Proportionately higher vs left side. .  supine to sit neg. .     test ?+ bilat ( minimal discomfort reported bilat. ) Left ADD brev. TTP.   10/13/20 MMT Left hip abd 4+/5  Exercises:  Exercise/Equipment Resistance/Repetitions Other comments   Positional ITB stretch in SL'ing 30-60\" stretch x 2  Worked with pt to find best position for stretch   Hip flex stretch        Clams vs T band  Reported feeling tension in left hip   Lateral stepping vs T band          Rectus stretch  Prone   Prone alt knee flexion      Modified katya stretch to L      Prone alt hip ext   Improved tolerance post scs to left  Add. Bridges   ADD TO HEP   rj pose 3 way     Standing pulley LLE  abd  Add   ext      Pt reported right hip stiffness post ex        HEP      4 way hip X 30 ea    Ppt with ball X 30 Reviewed Technique   dktc 30 x 5                Other Therapeutic Activities:      Home Exercise Program:    Access Code: [email or web address]   URL: [employer]/   Date: 09/08/2020   Prepared by: Andrea Hernandez     Exercises   Supine Bridge - 10 reps - 1 sets - 10 hold - 1-2x daily - 7x weekly   Prone Press Up - 7 reps - 1 sets - 20 hold - 1-2x daily - 7x weekly    (HOLD as of 9/22/20)    Access Code: [email or web address]   URL: [employer]/   Date: 09/22/2020   Prepared by: Andrea Hernandez     Exercises   Standing ITB Stretch - 4 reps - 1 sets - 30 hold - 1x daily - 7x weekly   9/24/20 Reviewed/ corrected tech. Access Code: Junior Julien   URL: [employer]/   Date: 09/24/2020   Prepared by: Micheal Galindo     Exercises   Sidelying ITB Stretch off Table - 4 reps - 1 sets - 30 hold - 1x daily - 7x weekly   Access Code: [email or web address]   URL: [employer]/   Date: 10/01/2020   Prepared by: Micheal Galindo   Considerable time taken to teach technique of this the following exercises. Exercises   Quadruped Piriformis Stretch - 2-4 reps - 1 sets - 30 hold - 1x daily - 3-4x weekly   Child's Pose Stretch - 2-4 reps - 1 sets - 30 hold - 1x daily - 3-4x weekly   Child's Pose with Sidebending - 2-4 reps - 1 sets - 30 hold - 1x daily - 3-4x weekly   Access Code: [email or web address]   URL: [employer]/   Date: 10/06/2020   Prepared by: Betzaida Patterson with Resistance - 10 reps - 4-6 sets - 2 hold - 1x daily - 3-4x weekly   Access Code: [email or web address]   URL: [employer]/   Date: 10/13/2020   Prepared by: Micheal Galindo     Exercises   Sidelying Hip Abduction - 20 reps - 31-2 sets - 1 hold - 1x daily - 3-4x weekly   Supine Bridge - 10 reps - 1 sets - 5-10 hold - 1x daily - 3-4x weekly   Butterfly Bridge - 5 reps - 10 sets - 1 hold - 1x daily - 7x weekly     The patient demonstrated good tolerance to and understanding of the HEP. Written instructions have been issued. Manual Treatments: 25 min  DTM/ deep friction to Left TFL/ ITB   Stretch to Left  rectus fem, ITB and left piriformis    mfr to left gluts, piriformis, itb,    Mobs       MET            Modalities:       Progression Towards Functional goals:  [] Patient is progressing as expected towards functional goals listed. [x] Progression is slowed due to complexities listed. [] Progression has been slowed due to co-morbidities. [] Plan just implemented, too soon to assess goals progression  [] Other:    Charges:     Therapeutic Exercise:  [] (12046) Provided verbal/tactile cueing for activities to restore or maintain strength, flexibility, endurance, ROM for improvements with self-care, mobility, lifting and ambulation. Neuromuscular Re-Education  [] (86595) Provided verbal/tactile cueing for activities to restore or maintain balance, coordination, kinesthetic sense, posture, motor skill, proprioception for self-care, mobility, lifting, and ambulation. Therapeutic Activities:    [] (44411) Provided verbal/tactile cueing to address functional limitations related to loss of mobility, strength, balance, and coordination. Gait Training:  [] (24197) Provided training and instruction to the patient for proper postural muscle recruitment and positioning with ambulation re-education     Home Exercise Program:    [] (19376) Reviewed/Progressed HEP activities related to strengthening, flexibility, endurance, ROM for functional self-care, mobility, lifting and ambulation   [] (87843) Reviewed/Progressed HEP activities related to improving balance, coordination, kinesthetic sense, posture, motor skill, proprioception for self-care, mobility, lifting, and ambulation      Manual Treatments:  MFR / STM / Oscillations-Mobs:  G-I, II, III, IV / Manipulation / MLD  [] (37638) Provided manual therapy to mobilize  soft tissue/joints/fluid for the purpose of modulating pain, promoting relaxation, increasing ROM, reducing/eliminating soft tissue swelling/inflammation/restriction, improving soft tissue extensibility and allowing for proper ROM for normal function with self- care, mobility, lifting and ambulation.         Timed Code Treatment Minutes: 42   Total Treatment Minutes: 45     [] EVAL (LOW) 34972   [] EVAL (MOD) 15591   [] EVAL (HIGH) 45086   [] RE-EVAL   [x] TE (10317) x   [] Aquatic (44662) x  [] NMR (70788)   x  [] Aquatic Group (92001) x  [x] Manual (23569) x 2   [] Ultrasound (14150) x  [] TA (98663) x  [] Mech Traction (61746)  [] Ionto (61206)           [] ES (un) (45696):   [] Vasopump (89694) [] Other:      Assessment  [x] Patient tolerated treatment well [] Patient limited by fatigue  [] Patient limited by pain  [] Patient limited by other medical complications  [] Other:     Prognosis: [x] Good [] Fair  [] Poor    Goals:    Short term goals  Time Frame for Short term goals: 15  Short term goal 1: pt will have full left hip rom to help her meet her goals  Short term goal 2: pt will display improved ns posture to help her meet her goals  Short term goal 3: pt will have 5-/5 strength to help her meet her goals            Patient Requires Follow-up: [x] Yes  [] No    Plan:   [] Continue per plan of care [] Alter current plan (see comments)  [x] Plan of care initiated [] Hold pending MD visit [] Discharge  Note: If patient does not return for scheduled/ recommended follow up visits, this note will serve as a discharge from care along with most recent update on progress.     Plan for Next Session:   Lane Phelps / yadira DC                                       Last rx 10/15/20      Electronically signed by:  Rashel Gill, PT

## 2020-10-15 ENCOUNTER — HOSPITAL ENCOUNTER (OUTPATIENT)
Dept: PHYSICAL THERAPY | Age: 55
Setting detail: THERAPIES SERIES
Discharge: HOME | End: 2020-10-15
Payer: COMMERCIAL

## 2020-10-15 PROCEDURE — 97110 THERAPEUTIC EXERCISES: CPT

## 2020-10-15 ASSESSMENT — PAIN SCALES - QUEBEC BACK PAIN DISABILITY SCALE
CARRY TWO BAGS OF GROCERIES: 0
MOVE A CHAIR: 0
PUT ON SOCKS OR PANYHOSE: 0
PULL OR PUSH HEAVY DOORS: 1
CLIMB ONE FLIGHT OF STAIRS: 0
GET OUT OF BED: 0
SIT IN A CHAIR FOR SEVERAL HOURS: 0
TURN OVER IN BED: 0
WALK SEVERAL KILOMETERS  OR MILES: 0
RUN ONE BLOCK OR 100M: 1
THROW A BALL: 0
SLEEP THROUGH THE NIGHT: 0
RIDE IN A CAR: 0
BEND OVER TO CLEAN THE BATHTUB: 0
WALK A FEW BLOCKS OR 300 TO 400M: 0
LIFT AND CARRY A HEAVY SUITCASE: 1
STAND UP FOR 20 TO 30 MINUTES: 0
TAKE FOOD OUT OF THE REFRIGERATOR: 0
MAKE YOUR BED: 0

## 2020-10-15 NOTE — PROGRESS NOTES
[x]Patient has plateaued and is no longer responding to skilled PT intervention                        []Patient is getting worse and would benefit from return to referring MD              []Patient unable to adhere to initial POC              []Other:     Rosary Left term goals  Time Frame for Short term goals: 15  Short term goal 1: pt will have full left hip rom to help her meet her goals MET  Short term goal 2: pt will display improved ns posture to help her meet her goals MET  Short term goal 3: pt will have 5-/5 strength to help her meet her goals Partially MET    Goal Status:  [x] Achieved [x] Partially Achieved  [] Not Achieved     Reason for Discharge:  [] Goals Met   [] Patient did not return after initial evaluation   [x] Progress Plateaued [] Missed _____ scheduled appointments   [] No insurance coverage [] Patient terminated therapy   [] Other:        PT recommendation:   [x] Patient now discharged with HEP      [] Other:    Discharge discussed with:  [x] Patient  [] Caregiver       [] Other        Electronically signed by:  Radha Martinez PT    If you have any questions or concerns, please don't hesitate to call.   Thank you for your referral.

## 2020-10-15 NOTE — FLOWSHEET NOTE
soreness. 10/6/20 Pt reports she has less pain today, last Rx helped has no pain today. 10/8/20 Pt reports no pain today. She indicated she would like to finish out her sessions. (needs to leave early today for other medical testing)  10/13/20 Pt reports no hip pain today. 10/15/20 15 min  late  Pt reports no hip pain just hip weakness with activities like stairs. Objective:   See eval  9/8/20  Supine to sit RLE lengthens,  SIJ hypomobile with flexion at LS  9/17/20  Right Pelvis and greater troch. Proportionately higher vs left side. .  supine to sit neg. .     test ?+ bilat ( minimal discomfort reported bilat. ) Left ADD brev. TTP.   10/13/20 MMT Left hip abd 4+/5        Home Exercise Program:    Access Code: [email or web address]   URL: [employer]/   Date: 09/08/2020   Prepared by: Temo Church     Exercises   Supine Bridge - 10 reps - 1 sets - 10 hold - 1-2x daily - 7x weekly   Prone Press Up - 7 reps - 1 sets - 20 hold - 1-2x daily - 7x weekly    (HOLD as of 9/22/20)    Access Code: [email or web address]   URL: [employer]/   Date: 09/22/2020   Prepared by: Temo Church     Exercises   Standing ITB Stretch - 4 reps - 1 sets - 30 hold - 1x daily - 7x weekly   10/15/20 Reviewed/ corrected tech. Pt demonstrated understanding/ proper tech. Access Code: Kirke Severance   URL: [employer]/   Date: 09/24/2020   Prepared by: Temo Church     Exercises   Sidelying ITB Stretch off Table - 4 reps - 1 sets - 30 hold - 1x daily - 7x weekly   10/15/20 reviewed minimal cues to correct Pt demonstrated understanding/ proper tech. Access Code: [email or web address]   URL: [employer]/   Date: 10/01/2020   Prepared by: Temo Church   Considerable time taken to teach technique of this the following exercises.   Exercises   Quadruped Piriformis Stretch - 2-4 reps - 1 sets - 30 hold - 1x daily - 3-4x weekly   Child's Pose Stretch - 2-4 reps - 1 sets - 30 hold - 1x daily - 3-4x weekly   Child's Pose with Sidebending - 2-4 reps - 1 sets - 30 hold - 1x daily - 3-4x weekly   Access Code: [email or web address]   URL: [employer]/   Date: 10/06/2020   Prepared by: Jennifer Daniel with Resistance - 10 reps - 4-6 sets - 2 hold - 1x daily - 3-4x weekly   Access Code: [email or web address]   URL: [employer]/   Date: 10/13/2020   Prepared by: Raqule Aguero     Exercises   Sidelying Hip Abduction - 20 reps - 31-2 sets - 1 hold - 1x daily - 3-4x weekly   Supine Bridge - 10 reps - 1 sets - 5-10 hold - 1x daily - 3-4x weekly   Butterfly Bridge - 5 reps - 10 sets - 1 hold - 1x daily - 7x weekly   10/15/20 Reviewed and corrected tech. For each ex,  Pt demonstrated understanding/ proper tech. The patient demonstrated good tolerance to and understanding of the HEP. Written instructions have been issued. Manual Treatments:     Modalities:       Progression Towards Functional goals:  [] Patient is progressing as expected towards functional goals listed. [x] Progression is slowed due to complexities listed. [] Progression has been slowed due to co-morbidities. [] Plan just implemented, too soon to assess goals progression  [] Other:    Charges: Therapeutic Exercise:  [] (03334) Provided verbal/tactile cueing for activities to restore or maintain strength, flexibility, endurance, ROM for improvements with self-care, mobility, lifting and ambulation. Neuromuscular Re-Education  [] (98167) Provided verbal/tactile cueing for activities to restore or maintain balance, coordination, kinesthetic sense, posture, motor skill, proprioception for self-care, mobility, lifting, and ambulation. Therapeutic Activities:    [] (86751) Provided verbal/tactile cueing to address functional limitations related to loss of mobility, strength, balance, and coordination.      Gait Training:  [] (22414) Provided training and instruction to the patient for proper postural muscle recruitment and positioning with ambulation re-education     Home Exercise Program:    [] (40990) Reviewed/Progressed HEP activities related to strengthening, flexibility, endurance, ROM for functional self-care, mobility, lifting and ambulation   [] (80536) Reviewed/Progressed HEP activities related to improving balance, coordination, kinesthetic sense, posture, motor skill, proprioception for self-care, mobility, lifting, and ambulation      Manual Treatments:  MFR / STM / Oscillations-Mobs:  G-I, II, III, IV / Manipulation / MLD  [] (69473) Provided manual therapy to mobilize  soft tissue/joints/fluid for the purpose of modulating pain, promoting relaxation, increasing ROM, reducing/eliminating soft tissue swelling/inflammation/restriction, improving soft tissue extensibility and allowing for proper ROM for normal function with self- care, mobility, lifting and ambulation.         Timed Code Treatment Minutes: 25   Total Treatment Minutes: 30     [] EVAL (LOW) 90757   [] EVAL (MOD) 20993   [] EVAL (HIGH) 79875   [] RE-EVAL   [x] TE (93597) x 2  [] Aquatic (47177) x  [] NMR (93850)   x  [] Aquatic Group (93077) x  [] Manual (43757) x    [] Ultrasound (22218) x  [] TA (06733) x  [] Mech Traction (68140)  [] Ionto (91574)           [] ES (un) (92939):   [] Vasopump (54761) [] Other:      Assessment  [x] Patient tolerated treatment well [] Patient limited by fatigue  [] Patient limited by pain  [] Patient limited by other medical complications  [] Other:     Prognosis: [x] Good [] Fair  [] Poor    Goals:    Short term goals  Time Frame for Short term goals: 15  Short term goal 1: pt will have full left hip rom to help her meet her goals MET  Short term goal 2: pt will display improved ns posture to help her meet her goals MET  Short term goal 3: pt will have 5-/5 strength to help her meet her goals Partially MET           Patient Requires Follow-up: [] Yes  [x] No    Plan:   [] Continue per plan of care [] Alter current plan (see comments)  [x] Plan

## 2020-10-19 ENCOUNTER — HOSPITAL ENCOUNTER (EMERGENCY)
Age: 55
Discharge: HOME | End: 2020-10-19
Payer: COMMERCIAL

## 2020-10-19 ENCOUNTER — APPOINTMENT (OUTPATIENT)
Dept: CT IMAGING | Age: 55
End: 2020-10-19
Payer: COMMERCIAL

## 2020-10-19 VITALS
WEIGHT: 158 LBS | RESPIRATION RATE: 18 BRPM | SYSTOLIC BLOOD PRESSURE: 138 MMHG | HEART RATE: 79 BPM | TEMPERATURE: 97.3 F | OXYGEN SATURATION: 99 % | HEIGHT: 68 IN | DIASTOLIC BLOOD PRESSURE: 86 MMHG | BODY MASS INDEX: 23.95 KG/M2

## 2020-10-19 LAB — HCG QUALITATIVE: NEGATIVE

## 2020-10-19 PROCEDURE — 72192 CT PELVIS W/O DYE: CPT

## 2020-10-19 PROCEDURE — 72131 CT LUMBAR SPINE W/O DYE: CPT

## 2020-10-19 PROCEDURE — 99285 EMERGENCY DEPT VISIT HI MDM: CPT

## 2020-10-19 PROCEDURE — 84703 CHORIONIC GONADOTROPIN ASSAY: CPT

## 2020-10-19 RX ORDER — ACETAMINOPHEN 500 MG
1000 TABLET ORAL
Qty: 30 TABLET | Refills: 0 | Status: SHIPPED | OUTPATIENT
Start: 2020-10-19 | End: 2021-03-17

## 2020-10-19 RX ORDER — MELOXICAM 15 MG/1
15 TABLET ORAL DAILY PRN
Qty: 30 TABLET | Refills: 0 | Status: SHIPPED | OUTPATIENT
Start: 2020-10-19 | End: 2021-03-17

## 2020-10-19 ASSESSMENT — PAIN DESCRIPTION - LOCATION: LOCATION: LEG

## 2020-10-19 ASSESSMENT — PAIN SCALES - GENERAL: PAINLEVEL_OUTOF10: 3

## 2020-10-19 ASSESSMENT — PAIN DESCRIPTION - PAIN TYPE: TYPE: ACUTE PAIN;CHRONIC PAIN

## 2020-10-19 ASSESSMENT — PAIN DESCRIPTION - ORIENTATION: ORIENTATION: LEFT

## 2020-10-19 NOTE — ED NOTES
Reviewed written discharge instructions with patient, patient requesting information on diagnosis. Information printed out from up-to-date and provided to patient. surjit denied further questions and verbalized understanding. Patient transported out of ED by nurse in a wheelchair.      Nieves Martinez RN  10/19/20 4973

## 2020-10-19 NOTE — ED PROVIDER NOTES
[facility]        Pt Name: Garald Litten  MRN: [id number]  [place] 1965  Date of evaluation: 10/19/2020  Provider: Leonides Lewis PA-C  PCP: SHON Fowler CNP    PETE. I have evaluated this patient. My supervising physician was available for consultation. Arabella Powers MD      [address] [place] [address]       Chief Complaint   Patient presents with    Extremity Weakness     left leg weakness since Saturday. pain radiates from buttocks down leg. Pt states she just finished PT for leg weakness this past wk       HISTORY OF PRESENT ILLNESS   (Location, Timing/Onset, Context/Setting, Quality, Duration, Modifying Factors, Severity, Associated Signs and Symptoms)  Note limiting factors. Garald Litten is a 50 y.o. female patient presenting with left hip pain, left leg weakness and low back pain on the left. The patient reports symptoms began about 2 months ago. No clear traumatic event. Patient was seen and evaluated and sent to physical therapy which seemed to help. Patient seemed to gradually improve and then became worse once again. She was then returned to physical therapy and completed therapy last Thursday and the pain has again increased with left leg weakness associated. Patient states that originally in 2018 she was hit by a an instrument case causing initial pain. Physical therapy resolved that issue up until about 2-3 months ago when she was hit with a wheelchair while part of her work duty. Patient has been seen at this facility with imaging left hip and this was unremarkable. She has not had previous CT or MR imaging of the low back or pelvis or left hip. She has no bowel or bladder dysfunction but no saddle anesthesia. She states pain affects her ability to maintain an upright position as well as walking due to the weakness of the leg.     Nursing Notes were all reviewed and agreed with or any disagreements were addressed in the HPI. REVIEW OF SYSTEMS    (2-9 systems for level 4, 10 or more for level 5)     Review of Systems    Positives and Pertinent negatives as per HPI. Except as noted above in the ROS, all other systems were reviewed and negative. PAST MEDICAL HISTORY     Past Medical History:   Diagnosis Date    Anemia     iron defieciency    Asthma     Brain tumor ([address]     surgery for brain tumor 3/15/18    Hemorrhagic cyst of ovary 2007    Left    Hyperlipidemia     Tubular adenoma of colon 03/02/2018    COLONOSCOPY, DR CLAUDINE KHAN, TUBULAR ADENOMAS ASCENDING COLON, 3 MM, 4 MM, 5 MM REMOVED. SURGICAL HISTORY     Past Surgical History:   Procedure Laterality Date    COLONOSCOPY  03/02/2018    COLONOSCOPY, DR CLAUDINE KHAN, 3 MM, 4 MM, 5 MM ASCENDING COLON POLYP REMOVED. LIMITED PREP. REPEAT 3-6 MONTHS.  EPIGASTRIC HERNIA REPAIR  10/30/2017    with mesh     OTHER SURGICAL HISTORY Left     lump removed from under left arm pit in her 30's    TUBAL LIGATION Bilateral 8357    UMBILICAL HERNIA REPAIR  12/06/2016    and umbilectomy         CURRENTMEDICATIONS       Previous Medications    ACYCLOVIR (ZOVIRAX) 200 MG CAPSULE    TAKE 1 CAPSULE BY MOUTH THREE TIMES DAILY    ALBUTEROL SULFATE HFA (PROAIR HFA) 108 (90 BASE) MCG/ACT INHALER    Inhale 2 puffs into the lungs every 4 hours as needed for Wheezing    ATORVASTATIN (LIPITOR) 10 MG TABLET    TAKE 1 TABLET BY MOUTH EVERY DAY    PREDNISONE (DELTASONE) 10 MG TABLET    4 tabs x2 d 3 tabs x 2 d 2 tabs x2d 1 tab x 2 d in am with food.   No NSAIDS while taking         ALLERGIES     Other and Contrast [iodides]    FAMILYHISTORY       Family History   Problem Relation Age of Onset    High Cholesterol Mother     Diabetes Mother     High Cholesterol Father     Diabetes Father     Cancer Maternal Grandmother         colon    Cancer Paternal Grandmother         colon cancer          SOCIAL HISTORY       Social History     Tobacco Use    Smoking status: Never Smoker    Smokeless tobacco: Never Used   Substance Use Topics    Alcohol use: No    Drug use: No       SCREENINGS             PHYSICAL EXAM    (up to 7 for level 4, 8 or more for level 5)     ED Triage Vitals [10/19/20 1204]   BP Temp Temp Source Pulse Resp SpO2 Height Weight   138/86 97.3 °F (36.3 °C) Infrared 79 18 99 % 5' 8\" (1.727 m) 158 lb (71.7 kg)       Physical Exam  Vitals signs and nursing note reviewed. Constitutional:       Appearance: Normal appearance. She is well-developed and normal weight. HENT:      Head: Normocephalic and atraumatic. Right Ear: External ear normal.      Left Ear: External ear normal.   Eyes:      General: No scleral icterus. Right eye: No discharge. Left eye: No discharge. Conjunctiva/sclera: Conjunctivae normal.   Neck:      Musculoskeletal: Normal range of motion and neck supple. Cardiovascular:      Rate and Rhythm: Normal rate and regular rhythm. Heart sounds: Normal heart sounds. Pulmonary:      Effort: Pulmonary effort is normal.      Breath sounds: Normal breath sounds. Musculoskeletal:         General: Tenderness present. Comments: Patient does exhibit limited range of motion lumbar spine and left hip with groin pain during the flexion extension. No pain with internal/external rotation. Straight leg raise produces pain in the back of the leg. Flexes intact and equal both knee and ankle. Diminished due to pain. Skin:     General: Skin is warm and dry. Neurological:      General: No focal deficit present. Mental Status: She is alert and oriented to person, place, and time. Mental status is at baseline. Psychiatric:         Mood and Affect: Mood normal.         Behavior: Behavior normal.         Thought Content:  Thought content normal.         Judgment: Judgment normal.         DIAGNOSTIC RESULTS   LABS:    Labs Reviewed   HCG, SERUM, QUALITATIVE    Narrative: Performed at:  [facility]  [address], [address]   Phone [phone]       All other labs were within normal range or not returned as of this dictation. EKG: All EKG's are interpreted by the Emergency Department Physician in the absence of a cardiologist.  Please see their note for interpretation of EKG. RADIOLOGY:   Non-plain film images such as CT, Ultrasound and MRI are read by the radiologist. Plain radiographic images are visualized and preliminarily interpreted by the ED Provider with the below findings:        Interpretation per the Radiologist below, if available at the time of this note:    CT PELVIS WO CONTRAST Additional Contrast? None   Preliminary Result   Findings most consistent with changes associated with avascular necrosis   involving both femoral heads as described above. CT LUMBAR SPINE WO CONTRAST   Final Result   No evidence for fracture or malalignment lumbar spine. No significant canal   effacement. MRI LUMBAR SPINE WO CONTRAST    (Results Pending)     No results found. PROCEDURES   Unless otherwise noted below, none     Procedures    CRITICAL CARE TIME   N/A    CONSULTS:  None      EMERGENCY DEPARTMENT COURSE and DIFFERENTIAL DIAGNOSIS/MDM:   Vitals:    Vitals:    10/19/20 1204   BP: 138/86   Pulse: 79   Resp: 18   Temp: 97.3 °F (36.3 °C)   TempSrc: Infrared   SpO2: 99%   Weight: 158 lb (71.7 kg)   Height: 5' 8\" (1.727 m)       Patient was given the following medications:  Medications - No data to display        I did obtain CT scan lumbar spine and pelvis. Results show evidence of avascular process both femoral heads. Likely related to steroid use as she had brain surgery in the past.  She has had previous trauma by do not believe at this time the trauma is a factor in AVN both hips. Left leg weakness noted.   This could be related to the left hip, however I placed order for MRI to exclude any lumbar pathology not seen by CT scan. I referred to Dr. Abigail Briceno orthopedist for further evaluation of her problem. Start patient on meloxicam 50 mg #30x0. In addition Tylenol 500 mg taking 2 tablets 3 times daily as needed for additional pain control. I taken patient off work until next Monday allowing her time to follow-up with orthopedist.  Patient does express understanding of diagnosis and treatment plan. At this time I will avoid prednisone. FINAL IMPRESSION      1. Avascular necrosis of bone of hip, unspecified laterality ([address]    2. Weakness of left leg    3. Right-sided low back pain with left-sided sciatica, unspecified chronicity          DISPOSITION/PLAN   DISPOSITION Decision To Discharge 10/19/2020 02:41:25 PM      PATIENT REFERREDTO:  Boo Macdonaldradha, [address] [address] [address]  [address] [place] [address]  [phone]    Schedule an appointment as soon as possible for a visit in 2 days      [address]  [phone]    Schedule an appointment as soon as possible for a visit   As needed    [facility] Emergency Department  [address] [facility]  [phone]  Go to   If symptoms worsen      DISCHARGE MEDICATIONS:  New Prescriptions    ACETAMINOPHEN (TYLENOL) 500 MG TABLET    Take 2 tablets by mouth 3 times daily (with meals)    MELOXICAM (MOBIC) 15 MG TABLET    Take 1 tablet by mouth daily as needed for Pain       DISCONTINUED MEDICATIONS:  Discontinued Medications    No medications on file              (Please note that portions of this note were completed with a voice recognition program.  Efforts were made to edit the dictations but occasionally words are mis-transcribed. )    Tabitha Santiago PA-C (electronically signed)           Tabitha Santiago PA-C  10/19/20 2832

## 2020-10-20 ENCOUNTER — IMMUNIZATION (OUTPATIENT)
Dept: FAMILY MEDICINE CLINIC | Age: 55
End: 2020-10-20
Payer: COMMERCIAL

## 2020-10-20 ENCOUNTER — CARE COORDINATION (OUTPATIENT)
Dept: CARE COORDINATION | Age: 55
End: 2020-10-20

## 2020-10-20 PROCEDURE — 90471 IMMUNIZATION ADMIN: CPT | Performed by: NURSE PRACTITIONER

## 2020-10-20 PROCEDURE — 90686 IIV4 VACC NO PRSV 0.5 ML IM: CPT | Performed by: NURSE PRACTITIONER

## 2020-10-20 NOTE — CARE COORDINATION
Patient contacted regarding [place]. Discussed COVID-19 related testing which was not done at this time. Test results were not done. Patient informed of results, if available? N/A    Care Transition Nurse/ Ambulatory Care Manager contacted the patient by telephone to perform post discharge assessment. Call within 2 business days of discharge: Yes. Verified name and  with patient as identifiers. Provided introduction to self, and explanation of the CTN/ACM role, and reason for call due to risk factors for infection and/or exposure to COVID-19. Symptoms reviewed with patient who verbalized the following symptoms: no new symptoms and no worsening symptoms. Due to no new or worsening symptoms encounter was not routed to provider for escalation. Discussed follow-up appointments. If no appointment was previously scheduled, appointment scheduling offered: Yes  [facility] follow up appointment(s):   Future Appointments   Date Time Provider David Stevenson   10/21/2020  9:30 AM MD RAUL Campbell [facility]TH[facility]   10/23/2020  7:15 AM [address]     Non-[facility] follow up appointment(s):     Non-face-to-face services provided:  Obtained and reviewed discharge summary and/or continuity of care documents     Advance Care Planning:   Does patient have an Advance Directive:  not on file. Patient has following risk factors of: asthma. CTN/ACM reviewed discharge instructions, medical action plan and red flags such as increased shortness of breath, increasing fever and signs of decompensation with patient who verbalized understanding. Discussed exposure protocols and quarantine with CDC Guidelines What to do if you are sick with coronavirus disease .  Patient was given an opportunity for questions and concerns.  The patient agrees to contact the [facility] exposure line [phone], local [facility] department [place] Department of Health: ([phone]) and PCP office for questions related

## 2020-10-21 ENCOUNTER — OFFICE VISIT (OUTPATIENT)
Dept: ORTHOPEDIC SURGERY | Age: 55
End: 2020-10-21
Payer: COMMERCIAL

## 2020-10-21 VITALS — HEIGHT: 68 IN | WEIGHT: 158 LBS | BODY MASS INDEX: 23.95 KG/M2 | TEMPERATURE: 97.3 F

## 2020-10-21 PROCEDURE — 99244 OFF/OP CNSLTJ NEW/EST MOD 40: CPT | Performed by: ORTHOPAEDIC SURGERY

## 2020-10-21 NOTE — PROGRESS NOTES
Caridad Greco  [id number]  October 21, 2020    Chief Complaint   Patient presents with    Hip Pain     bilateral       History: The patient is a 68-year-old female who is here for evaluation of her hips. The patient reportedly developed rather severe left leg and groin pain this past Saturday. The pain was rather persistent on Sunday. She ultimately presented to the emergency room and was evaluated. The patient has been in and out of physical therapy over the past couple years for \"left leg weakness\". She does report intermittent left leg pain over the past couple years as well. She has been participating in a formal therapy program at [facility] for the past 2 months. She reportedly had pain surgery for a pituitary adenoma back in March 2018. She did require high-dose steroids for the swelling for nearly 10 months. She has been on prednisone 4-5 times as well this year for asthma. This is a consult from SHON Hart CNP  for left leg pain. The patient's  past medical history, medications, allergies,  family history, social history, and have been reviewed, and dated and are recorded in the chart. Pertinent items are noted in HPI. Review of systems reviewed from Pertinent History Form dated on 10/21/2020 and available in the patient's chart under the Media tab. Vitals:  Temp 97.3 °F (36.3 °C)   Ht 5' 8\" (1.727 m)   Wt 158 lb (71.7 kg)   LMP 02/07/2010   BMI 24.02 kg/m²     Physical: Physical: Ms. Caridad Greco appears well, she is in no apparent distress, she demonstrates appropriate mood & affect. She is alert and oriented to person, place and time. She has mild pain with internal rotation of the left hip. Range of motion of the left hip is : 40 degrees abduction, 30 degrees adduction, 35 degrees of external rotation and 30 degrees of internal rotation. Range of motion of the opposite hip is full. She is non tender laterally about the hips. Trendelenburg test is negative bilaterally. Nolene Chill test is negative bilaterally. She is non tender about the Sacroiliac joint bilaterally. Leg length discrepancy: none. The patient does have an antalgic gait favoring her left side. Tension signs in the left lower extremity are quite painful for her. She has severe pain when I attempted to straighten out her left knee. Examination of the skin reveals no rashes, ulcerations, or lesions, bilaterally in the lower extremities. Sensation to both lower extremities is grossly intact. Exam of both feet reveals pedal pulses intact and brisk cap refill. Patient is able to dorsiflex and wiggle all toes. Deep tendon reflexes of the lower extremities are normal and symmetric. She is non tender to palpation of the lumbar spine. X-rays: AP pelvis and 2 views of the left hip obtained on 9/22/2020 were extensively reviewed. The x-rays do reveal a sclerotic area within the femoral heads. There does not appear to be any gross femoral head collapse. CT scan of the pelvis obtained on 10/19/2020 was extensively reviewed. There is mild osteoarthritis of both hips. There is evidence of focal lucencies with surrounding sclerosis within both femoral heads. Again, there does not appear to be gross femoral head collapse. Impression: bilateral Hip Osteoarthritis, mild #2 bilateral hip avascular necrosis #3 left leg pain likely radicular in nature      Plan: At this time, I am not entirely convinced that her symptoms are originating from the avascular necrosis of the left hip. She does have an MRI scan scheduled of her lumbar spine for Friday. I completely agree with the plan for MRI of the lumbar spine. We will also add an MRI of the pelvis to assess the avascular necrosis and make certain that there is no collapse. The patient will continue taking the meloxicam and the Tylenol. Further recommendations will follow.

## 2020-10-23 ENCOUNTER — HOSPITAL ENCOUNTER (OUTPATIENT)
Dept: MRI IMAGING | Age: 55
Discharge: HOME | End: 2020-10-23
Payer: COMMERCIAL

## 2020-10-23 PROCEDURE — 72148 MRI LUMBAR SPINE W/O DYE: CPT

## 2020-10-23 PROCEDURE — 72195 MRI PELVIS W/O DYE: CPT

## 2020-10-27 ENCOUNTER — OFFICE VISIT (OUTPATIENT)
Dept: ORTHOPEDIC SURGERY | Age: 55
End: 2020-10-27
Payer: COMMERCIAL

## 2020-10-27 VITALS — WEIGHT: 158 LBS | HEIGHT: 68 IN | BODY MASS INDEX: 23.95 KG/M2 | TEMPERATURE: 97.2 F

## 2020-10-27 PROCEDURE — 99214 OFFICE O/P EST MOD 30 MIN: CPT | Performed by: ORTHOPAEDIC SURGERY

## 2020-10-27 RX ORDER — ACYCLOVIR 200 MG/1
CAPSULE ORAL
Qty: 90 CAPSULE | Refills: 0 | Status: SHIPPED | OUTPATIENT
Start: 2020-10-27 | End: 2021-01-25

## 2020-10-27 NOTE — PROGRESS NOTES
Damian Gitelman  [id number]  October 27, 2020    Chief Complaint   Patient presents with    Follow-up     here to go over MRI Lumbar and pelvis done on 10/23/20       History: The patient is a 70-year-old female who is here for follow-up evaluation of her hips and back. She has been walking much better since her last visit. She does feel that she is improved compared to last visit. She is here to review the MRI results of the pelvis and back. The patient's  past medical history, medications, allergies,  family history, social history, and have been reviewed, and dated and are recorded in the chart. Pertinent items are noted in HPI. Review of systems reviewed from Pertinent History Form dated on 10/21/2020 and available in the patient's chart under the Media tab. Vitals:  Temp 97.2 °F (36.2 °C)   Ht 5' 8\" (1.727 m)   Wt 158 lb (71.7 kg)   LMP 02/07/2010   BMI 24.02 kg/m²     Physical: Physical: Ms. Damian Gitelman appears well, she is in no apparent distress, she demonstrates appropriate mood & affect. She is alert and oriented to person, place and time. She has minimal to no pain with internal rotation of the left hip. Range of motion of the left hip is : 40 degrees abduction, 30 degrees adduction, 35 degrees of external rotation and 30 degrees of internal rotation. Range of motion of the opposite hip is full. She does have mild tenderness to palpation over the left greater trochanter. Trendelenburg test is negative bilaterally. Julious Clark test is negative bilaterally. She is non tender about the Sacroiliac joint bilaterally. Leg length discrepancy: none. The patient does have an antalgic gait favoring her left side. Tension signs in the left lower extremity are negative. We were able to straighten her leg much more freely today. Examination of the skin reveals no rashes, ulcerations, or lesions, bilaterally in the lower extremities.   Sensation to both lower extremities is

## 2020-10-28 ENCOUNTER — PATIENT MESSAGE (OUTPATIENT)
Dept: FAMILY MEDICINE CLINIC | Age: 55
End: 2020-10-28

## 2020-10-28 NOTE — TELEPHONE ENCOUNTER
From: Gaby Gutierres  To: Drake Ling DO  Sent: 10/28/2020 7:56 AM EDT  Subject: Visit Follow-Up Question    I would like to schedule a virtual appointment to discuss the last week or so. I have had ct and mri scans. I was diagnosed with Avn. Thank you.

## 2020-10-30 ENCOUNTER — TELEPHONE (OUTPATIENT)
Dept: FAMILY MEDICINE CLINIC | Age: 55
End: 2020-10-30

## 2020-10-30 NOTE — TELEPHONE ENCOUNTER
PLEASE REFER HER TO     SOMEONE WHO SPECIALIZES IN AVN - SHE WAS DX ON 10/19/20 - EMERGENCY     SHE HAS ALREADY SEEN DR. Jason Sosa     CAN  YOU SUGGEST SOMEONE ELSE    PT @  [phone]

## 2020-10-30 NOTE — TELEPHONE ENCOUNTER
Referral placed to [facility]:  Bertha Barronrecida [address] [place] [address] [phone]

## 2020-11-03 ENCOUNTER — TELEPHONE (OUTPATIENT)
Dept: ORTHOPEDIC SURGERY | Age: 55
End: 2020-11-03

## 2020-11-10 NOTE — PATIENT INSTRUCTIONS
Chiqui Avilez was seen today for shortness of breath. Diagnoses and all orders for this visit:    Mild intermittent asthma with acute exacerbation  -     predniSONE (DELTASONE) 10 MG tablet; 4 tabs x2 d 3 tabs x 2 d 2 tabs x2d 1 tab x 2 d in am with food. No NSAIDS while taking  The trigger for this asthma worsening could be the same virus that caused the stomach flu. If you are not getting better please call us back. If you at any point feel this is turned into an infection and have a fever you should call us back immediately whether you believe it is COVID-19 or any other virus or a bacterial bronchitis. Immediately start the regimen for treatment of respiratory infections below and call us also. Viral gastroenteritis (the stomach flu)  TREATMENT FOR FUTURE REFERENCE:  Clear liquids for 24 hrs ie gatorade, water. Ginger ale or ginger tea may help with nausea and vomiting. Then BRAT (Bananas/ Rice/ Applesauce/ Toast) diet and clear liquids  for another 24 hrs, then 3-5 days of no sweets, dairy and low fat diet. Will call in ondansetron (Zofran) OR phenergan (promethazine) for the vomiting if desired. Use Tylenol (Acetaminophen) if pain or fevers NOT aspirin/ Ibuprofen/ Aleve. Be seen if worsening symptoms or abdominal pain. IF PERSISTENT DIARRHEA  Adding elements of the BRAT diet (Bananas/ Rice/ Applesauce/ Toast) diet will help slow diarrhea. Make sure you are drinking plenty of fluids with potasium such as sports drinks or diluted low sugar juices, water and other caffeine-free clear liquids until you feel better and enough so that your urine is light yellow or clear like water. If you have kidney, heart, or liver disease and have to limit fluids, ask us before you increase the amount of fluids you drink. Use Tylenol if having pain or fevers NOT aspirin/ Ibuprofen/ Aleve. Be seen if worsening symptoms or abdominal pain or bloody stools.  Watch for signs of dehydration, which means your body has lost too much water. Dehydration is a serious condition and should be treated right away. Signs of dehydration are:    Increasing thirst and dry eyes and mouth.  Feeling faint or lightheaded.  Darker urine, and a smaller amount of urine than normal.  Things to avoid.  Avoid spicy foods, fruits, alcohol, and caffeine until 48 hours after all symptoms are gone.  Avoid chewing gum that contains sorbitol.  Avoid dairy products (except for yogurt with Lactobacillus) while you have diarrhea and for 3 days after symptoms are gone. Avoid sweets and eat a low fat diet. Stop fish oil and other oil supplements. Zinc pill (not lozenges) 50 mg twice daily WITH FOOD taper 1/2 pill twice daily as stools firm and then continue to taper as stools become normal.   Acidophilus pills/caps/chews 3x/day. (Probiotics: Align or others which have multiple different good bacteria in them.)  Vitamin C 500 mg twice daily. If you take you metformin for diabetes make sure it is taken with carbs/starches. If diarrhea persists on metformin call for an alternate medicine until diarrhea resolves. You can take over-the-counter medicine, such as loperamide (Imodium), if you still have diarrhea after 6 hours. Read and follow all instructions on the label. Do not use this medicine if you have bloody diarrhea, a high fever, or other signs of serious illness. Call your doctor if you think you are having a problem with your medicine. Take your temperature if you are feeling hot. If diarrhea persists we will take stool samples for:  Stool WBCs, Stool cultures, O&P may be needed. Encounter by telehealth for suspected COVID-19  -     COVID-19 Ambulatory; Future  The risk is likely to increase for COVID-19 infection as the reopening occurs. This is because while some people are being careful with social distancing masks and social isolation other people are totally ignoring it.      Preventing the Spread of Coronavirus Disease 2019 in Homes and Residential Communities   For the most recent information go to [email or web address]    Prevention steps for People with confirmed or suspected COVID-19 (including persons under investigation) who do not need to be hospitalized  and   People with confirmed COVID-19 who were hospitalized and determined to be medically stable to go home    Your healthcare provider and public health staff will evaluate whether you can be cared for at home. If it is determined that you do not need to be hospitalized and can be isolated at home, you will be monitored by staff from your local or state health department. You should follow the prevention steps below until a healthcare provider or local or state health department says you can return to your normal activities. Stay home except to get medical care  People who are mildly ill with COVID-19 are able to isolate at home during their illness. You should restrict activities outside your home, except for getting medical care. Do not go to work, school, or public areas. Avoid using public transportation, ride-sharing, or taxis. Separate yourself from other people and animals in your home  People: As much as possible, you should stay in a specific room and away from other people in your home. Also, you should use a separate bathroom, if available. Animals: You should restrict contact with pets and other animals while you are sick with COVID-19, just like you would around other people. Although there have not been reports of pets or other animals becoming sick with COVID-19, it is still recommended that people sick with COVID-19 limit contact with animals until more information is known about the virus. When possible, have another member of your household care for your animals while you are sick.  If you are sick with COVID-19, avoid contact with your pet, including petting, snuggling, being kissed or licked, and sharing food. If you must care for your pet or be around animals while you are sick, wash your hands before and after you interact with pets and wear a facemask. Call ahead before visiting your doctor  If you have a medical appointment, call the healthcare provider and tell them that you have or may have COVID-19. This will help the healthcare providers office take steps to keep other people from getting infected or exposed. Wear a facemask  You should wear a facemask when you are around other people (e.g., sharing a room or vehicle) or pets and before you enter a healthcare providers office. If you are not able to wear a facemask (for example, because it causes trouble breathing), then people who live with you should not stay in the same room with you, or they should wear a facemask if they enter your room. Cover your coughs and sneezes  Cover your mouth and nose with a tissue when you cough or sneeze. Throw used tissues in a lined trash can. Immediately wash your hands with soap and water for at least 20 seconds or, if soap and water are not available, clean your hands with an alcohol-based hand  that contains at least 60% alcohol. Clean your hands often  Wash your hands often with soap and water for at least 20 seconds, especially after blowing your nose, coughing, or sneezing; going to the bathroom; and before eating or preparing food. If soap and water are not readily available, use an alcohol-based hand  with at least 60% alcohol, covering all surfaces of your hands and rubbing them together until they feel dry. Soap and water are the best option if hands are visibly dirty. Avoid touching your eyes, nose, and mouth with unwashed hands. Avoid sharing personal household items  You should not share dishes, drinking glasses, cups, eating utensils, towels, or bedding with other people or pets in your home.  After using these items, they should be washed thoroughly with soap and water. Clean all high-touch surfaces everyday  High touch surfaces include counters, tabletops, doorknobs, bathroom fixtures, toilets, phones, keyboards, tablets, and bedside tables. Also, clean any surfaces that may have blood, stool, or body fluids on them. Use a household cleaning spray or wipe, according to the label instructions. Labels contain instructions for safe and effective use of the cleaning product including precautions you should take when applying the product, such as wearing gloves and making sure you have good ventilation during use of the product. Monitor your symptoms  Seek prompt medical attention if your illness is worsening (e.g., difficulty breathing). Before seeking care, call your healthcare provider and tell them that you have, or are being evaluated for, COVID-19. Put on a facemask before you enter the facility. These steps will help the healthcare providers office to keep other people in the office or waiting room from getting infected or exposed. Ask your healthcare provider to call the local or state health department. Persons who are placed under active monitoring or facilitated self-monitoring should follow instructions provided by their local health department or occupational health professionals, as appropriate. When working with your local health department check their available hours. If you have a medical emergency and need to call 911, notify the dispatch personnel that you have, or are being evaluated for COVID-19. If possible, put on a facemask before emergency medical services arrive. Discontinuing home isolation  Patients with confirmed COVID-19 should remain under home isolation precautions until the risk of secondary transmission to others is thought to be low. The decision to discontinue home isolation precautions should be made on a case-by-case basis, in consultation with healthcare providers and [facility] and local health departments.     Use Tylenol NOT Ibuprofen/Aleve/aspirin for pain or fevers. There is a theoretical decrease in the body's ability to fight the virus when you are infected if you are on NSAIDs. The FDA has said that this information is not yet proven. The newest evidence suggest that wearing a mask in public may be beneficial if you remember that the outside of it is contaminated and treat it accordingly. It also helps prevent spread if someone has an asymptomatic or presymptomatic case and does not know it yet. Even cloth masks can be beneficial.    Advance Care Planning  People with COVID-19 may have no symptoms, mild symptoms, such as fever, cough, and shortness of breath or they may have more severe illness, developing severe and fatal pneumonia. As a result, Advance Care Planning with attention to naming a health care decision maker (someone you trust to make healthcare decisions for you if you could not speak for yourself) and sharing other health care preferences is important BEFORE a possible health crisis. Please contact your Primary Care Provider to discuss Advance Care Planning. Vitamin D by mouth and vitamin C intravenously are being used as part of the treatment regimen for COVID-19 in some hospitalized patients. You have a vitamin D deficiency. Your medication list does not show that you are taking vitamin D.   Ref. Range 3/14/2014 10:09   Vit D, 25-Hydroxy 30 - 100 ng/mL 14 (L)   A low level of vitamin D is associated with: Increased risk of death from cardiovascular disease, cognitive impairment in older adults, severe asthma in children, cancer especially breast and colon. Vitamin D may also have a role in treatment of diabetes and prediabetes, high blood pressure, psoriasis and multiple sclerosis. Vitamin D deficiency weakens bones leading to rickets in children and osteoporosis in adults, increases risk of infections, and leads to more skin problems.  The primary symptoms of deficiency are often muscle aches and weakness as well as fatigue often mistaken for natural aging.  (400 IU = 10 mcg, 1000 IU = 25 mcg, 4000 IU = 100 mcg, 5000 IU = 125 mcg)  Recommend vitamin D 5000 IU daily for 1 year and then recheck level and you may only need 3-5 doses a week. Also start vitamin C 500 mg daily. Both of these should be continued for the duration of the moderate risk portion of the COVID-19 pandemic expected to be 2 years according to the [employer]. Continue wearing a mask when around people except those living in the same house who are not infected with or heavily exposed to COVID-19, handwashing/hand gel use, social distancing, and social isolation for nonessential activities. IF you develop ANY respiratory infection symptoms (not just allergy symptoms) suggestive of COVID-19 start the recommendations below: Instructions for Respiratory Infections (SAVE THIS SHEET)    For the first 7-14 days of symptoms follow instructions below, even before being seen in the office or even during treatment with antibiotics, until symptom free. 1. Water: Drink 1 ounce of water for every 2 pounds of body weight for adults, you need 70 ounces of water/fluids per day. This will loosen mucus in the head and chest & improve the weak feeling of dehydration, allow the body to get germ fighting resources to the infection. Half of fluids can be juice or sugar free Crystal Light. Don't count drinks with caffeine, alcohol or carbonation. Infants can have Pedialyte liquid or freezer pops. Avoid salt and sports drinks if you have high Blood Pressure, swelling in the feet or ankles or have heart problems. 2. Humidity: Humidify the air to 35-50% ( or until the windows fog over slightly). Can use a humidifier, vaporizer, boil water on the stove or put a coffee can full of water on the heater vents. This will loosen mucus from infections and allergies.   3. Sleep: Get 8-10 hours a night and rest during the evening after work or school. If you have trouble sleeping, adults can take Melatonin 5mg up to 2 tabs at bedtime ( not for children or pregnant women). If Mono is suspected then sleep during 9PM to 9AM time span (if possible.)  4. Cough: Take cough medicines with Guaifenesin ( to loosen chest or head congestion) and Dextromethorphan ( to decrease excess cough). Robitussin D.M. Syrup every 4-6 hrs OR Mucinex D. M. pills OR Delsym DM syrup twice a day. Use the pediatric formulations for children over 6 months making sure they are alcohol & sugar free for children, pregnant women, and diabetics. 5. Pain And Fevers: Take Acetaminophen ( Tylenol) for fevers, aches, and headaches. 2-500 mg every 8 hours for adults. Appropriate doses at bedtime for children may help them sleep better. If pregnant take 1 -500 mg (Tylenol) every 8 hours as needed. Ibuprofen/Aleve/aspirin for pain and fevers SHOULD NOT BE USED IN THE SETTING OF POSSIBLE COVID-19 viral infection NOR if pregnant, if you have acid reflux, high blood pressure, CHF, or kidney problems. 6.Gargle: (DAY ONE OF SYMPTOMS) Gargle in the back of the throat with the head tilted back and to the sides with a strong mouthwash  ( Listerine or Scope) after meals and at bedtime at least 4 -5 times a day. This helps kill bacteria and viruses in the back of the throat and will shorten the duration and decrease the severity of your symptoms: sore throat, cough, ear popping,/ear pain, and possibly dizziness. 7. Smoking: Avoid smoking or exposure to second hand smoke. 8. Zinc: (DAY ONE OF SYMPTOMS)  Zinc lozenges such as Cold Boris (available most stores), or Basic ([employer] brand) will help shorten the duration and lessen symptoms such as sore throat, cough, nasal congestion, runny nose, and post nasal drip. Use 1 lozenge every 2-4 hours ( after meals if stomach is sensitive). Children can use 10-15 mg or less 3-4 times a day or Zinc lollypops.  In pregnancy limit to 50-60

## 2020-11-12 ENCOUNTER — OFFICE VISIT (OUTPATIENT)
Dept: ORTHOPEDIC SURGERY | Age: 55
End: 2020-11-12
Payer: COMMERCIAL

## 2020-11-12 VITALS — TEMPERATURE: 97.3 F | BODY MASS INDEX: 24.86 KG/M2 | HEIGHT: 68 IN | WEIGHT: 164 LBS

## 2020-11-12 PROCEDURE — 99214 OFFICE O/P EST MOD 30 MIN: CPT | Performed by: ORTHOPAEDIC SURGERY

## 2020-11-12 NOTE — LETTER
[facility]  Surgery Scheduling Form:      DEMOGRAPHICS:                                                                                                                Patient Name:  Morenita Zavala  Patient :  1965   Patient SS#:      Patient Phone:  [phone] (home)  Alt. Patient Phone:    Patient Address:  [address] [address] [address]    PCP:  SHON Ferrari CNP  Insurance:   Payor/Plan Subscr  Sex Relation Sub. Ins. ID Effective Group Num   1. [address] [facility]* 1965 Female  [id number] 1/1/15 [id number]                                    [id number]       DIAGNOSIS & PROCEDURE:                                                                                              Diagnosis:   Left Hip arthritis    M16.12  Operation:  Left Hip injection under fluoro    [address]  Location:  [facility]  Surgeon:  Gael Peter. Moris Pereira MD    SCHEDULING INFORMATION:                                                                                         .    Surgeon's Scheduling Instruction: 3.5cc of injectable 0.25%Marcaine and 1.5cc of injectable Kenalog 40 mg    Requested Date:   OR Time:   12:15      Patient Arrival Time:  12:00  OR Time Required:     Anesthesia:    Equipment:    Mini C-Arm:     Standard C-Arm:    Status: outpatient  PAT Required:    Comments:                      Mary Jo Holley.  Ren Beatty MD      20  BILLING INFORMATION:                                                                                                     Procedure:       CPT Code Modifier  71315  65973    Left Hip injection under fluoro                            Pre-Certification:

## 2020-11-12 NOTE — PROGRESS NOTES
This dictation was done with Maximuson dictation and may contain mechanical errors related to translation. Temperature 97.3 °F (36.3 °C), height 5' 8\" (1.727 m), weight 164 lb (74.4 kg), last menstrual period 02/07/2010, not currently breastfeeding.

## 2020-11-15 NOTE — PROGRESS NOTES
Patient is a 80-year-old female presents today complaining of left hip pain. Patient has been seen by another orthopedic surgeon was given the diagnosis of avascular necrosis of both right and left hips. Patient is here basically for a second opinion and for further evaluation. This patient has a risk factor of having asthma and has seen prednisone and oral steroids in the past.  Patient has no history of alcoholism IV drug abuse sickle cell anemia or trauma to the left hip. Patient is also undergone a variety of evaluation studies including not only MRI of the pelvis but CAT scan of the pelvis and MRI and CAT scan of her lumbar spine. On top of the bilateral avascular necrosis that becomes obvious with MRI this patient also has degenerative lumbar disease. She complains of pain loss of range of motion and difficulty with ambulation of her left hip. She has at least today no right hip symptoms. Patient has had these issues for about the last 3 to 4 months. She has no history of injury or surgery to the left hip. Patient does not smoke take narcotics or any hormone replacement medication. She has no history of DVT or pulmonary embolism. The patient has stated above is asthmatic and has had courses of both prednisone and methylprednisolone in the past.  She states that she has no metal allergies no back problems no history of diabetes and no family history of degenerative osteoarthritis. She is here for further evaluation and follow-up. Patient Active Problem List   Diagnosis    Hyperlipidemia    Anemia    Vitamin D deficiency    Hypertriglyceridemia    Asthma    Pituitary adenoma with extrasellar extension ([facility] [address]     Prior to Visit Medications    Medication Sig Taking?  Authorizing Provider   acyclovir (ZOVIRAX) 200 MG capsule TAKE ONE CAPSULE BY MOUTH THREE TIMES DAILY Yes SHON Browning - CNP   meloxicam (MOBIC) 15 MG tablet Take 1 tablet by mouth daily as needed for Pain Yes Dariusz Bailon PA-C   atorvastatin (LIPITOR) 10 MG tablet TAKE 1 TABLET BY MOUTH EVERY DAY Yes SHON Guy CNP   albuterol sulfate HFA (PROAIR HFA) 108 (90 Base) MCG/ACT inhaler Inhale 2 puffs into the lungs every 4 hours as needed for Wheezing Yes SHON Echevarria CNP   acetaminophen (TYLENOL) 500 MG tablet Take 2 tablets by mouth 3 times daily (with meals)  Patient not taking: Reported on 11/12/2020  Dariusz Bailon PA-C   predniSONE (DELTASONE) 10 MG tablet 4 tabs x2 d 3 tabs x 2 d 2 tabs x2d 1 tab x 2 d in am with food. No NSAIDS while taking  Hollie Low, DO     Physical examination 53-year-old female oriented x3 temperature is 97.3. Examination of her back shows mild decreased range of motion but no specific pain tenderness or instability noted. Motor exam to the right and left lower extremity shows quadriceps hamstrings hip abductors and abductors foot plantar dorsiflexors are intact 4-5 over 5. Sensation and perfusion are intact to both right and left foot. There is no numbness or tingling noted in either right or left lower extremity. Deep tendon reflexes are 1 at the knee and 0 at the ankle of both right and left lower extremity. No other obvious cutaneous lesions lymphedema or cellulitic processes are seen in either right or left lower extremity. Examination of the left hip shows good range of motion but some pain at high degrees of flexion and internal rotation. No obvious signs of instability or deep sepsis are seen in the right hip or the left hip. X-rays obtained on September 22, 2020 shows some increased sclerosis and visualization of an avascular event in both right and left hips. MRI in the past have shown bilateral avascular necrosis with some flattening at least via MRI views of the left femoral head.   This would place her into the Ficat classification of 2 possibly 3.  Radiographically however she has not had any collapse of her femoral head.        Patient also has undergone MRI of the lumbar spine and outside of diffuse annular bulging at L4-5 resulting in minimal bilateral neural foraminal stenosis there are no acute lumbar spine abnormalities. Impression 59-year-old female with history of asthma and use of steroids with bilateral avascular necrosis of her femoral heads. It appears that her left hip is bothering her more than her right hip. This patient is not interested in any surgical approach at this point time. Unfortunately this patient has at least a 50% chance of having to undergo hip arthroplasty for control of her symptoms of either left or right hip. Plan we had a 35-minute face-to-face discussion with this patient of which greater than 50% of time was spent on counseling her about further care and treatment of her bilateral femoral head avascular necrosis. At this point time her right hip is not bothering her at all of her left hip appears to be giving her the most symptoms. This correlates with the radiographic and MRI findings. This patient more than likely will need to undergo hip arthroplasty in the future to control her symptoms. However at this point we would suggest intra-articular cortisone injections to at least make sure that this is where her symptoms are coming from. She understands that once she gets a cortisone injection she will not be able to have any other type of surgery performed on her hip for at least 3 months. We did discuss the variety of other surgical procedures that are described for avascular necrosis including core decompression fibular graft strut vascularized fibular graft strut and also medical management. She understands these all and we will start with a cortisone injection and we will schedule her sometime in the next week for this. Follow-up at that time.

## 2020-11-19 ENCOUNTER — OFFICE VISIT (OUTPATIENT)
Dept: ORTHOPEDIC SURGERY | Age: 55
End: 2020-11-19
Payer: COMMERCIAL

## 2020-11-19 ENCOUNTER — HOSPITAL ENCOUNTER (OUTPATIENT)
Dept: GENERAL RADIOLOGY | Age: 55
Discharge: HOME | End: 2020-11-19
Payer: COMMERCIAL

## 2020-11-19 PROCEDURE — 6360000002 HC RX W HCPCS

## 2020-11-19 PROCEDURE — 2500000003 HC RX 250 WO HCPCS

## 2020-11-19 PROCEDURE — 20610 DRAIN/INJ JOINT/BURSA W/O US: CPT

## 2020-11-19 PROCEDURE — 77002 NEEDLE LOCALIZATION BY XRAY: CPT

## 2020-11-23 NOTE — PROGRESS NOTES
After obtaining the patient's consent, the patient was placed supine on the fluoroscopy table. The Left groin was prepped with betadine. The skin was infiltrated with 5 cc's of Marcaine. After local anesthesia was achieved, the hip was visualized under fluoroscopic guidance. The needle was placed anteriorly into the   Left hip joint and aspiration was performed. No blood or joint fluid was aspirated. Then 5 cc's of Kenalog was injected into the Left hip joint. The needle was removed and a bandage was applied. The patient tolerated the procedure without any difficulty. The patient was injected for degenerative arthritis of the hip.

## 2020-12-07 ENCOUNTER — OFFICE VISIT (OUTPATIENT)
Dept: PRIMARY CARE CLINIC | Age: 55
End: 2020-12-07
Payer: COMMERCIAL

## 2020-12-07 PROCEDURE — 99211 OFF/OP EST MAY X REQ PHY/QHP: CPT | Performed by: NURSE PRACTITIONER

## 2020-12-07 NOTE — PATIENT INSTRUCTIONS

## 2020-12-07 NOTE — PROGRESS NOTES
Roland Camryn received a viral test for COVID-19. They were educated on isolation and quarantine as appropriate. For any symptoms, they were directed to seek care from their PCP, given contact information to establish with a doctor, directed to an urgent care or the emergency room.

## 2020-12-08 LAB — SARS-COV-2, NAA: NOT DETECTED

## 2020-12-17 ENCOUNTER — OFFICE VISIT (OUTPATIENT)
Dept: ORTHOPEDIC SURGERY | Age: 55
End: 2020-12-17
Payer: COMMERCIAL

## 2020-12-17 VITALS — TEMPERATURE: 97 F | BODY MASS INDEX: 24.86 KG/M2 | WEIGHT: 164 LBS | HEIGHT: 68 IN

## 2020-12-17 PROBLEM — M87.059 AVASCULAR NECROSIS OF FEMUR (HCC): Status: ACTIVE | Noted: 2020-12-17

## 2020-12-17 PROBLEM — M25.562 LEFT KNEE PAIN: Status: ACTIVE | Noted: 2020-12-17

## 2020-12-17 PROCEDURE — 99214 OFFICE O/P EST MOD 30 MIN: CPT | Performed by: PHYSICIAN ASSISTANT

## 2020-12-17 PROCEDURE — 20610 DRAIN/INJ JOINT/BURSA W/O US: CPT | Performed by: PHYSICIAN ASSISTANT

## 2020-12-18 NOTE — PROGRESS NOTES
Subjective:      Patient ID: Cecilia Paiz is a 50 y.o. female. Chief Complaint   Patient presents with    Follow-up     left hip        HPI:   She is here for follow up on her left hip, AVN She is status post left hip intra-articular steroid injection. This was performed 11/19/2020 by Dr Shahana Banks. For about 3 days after the left hip intra-articular injection she had significant improvement in her left hip, groin and thigh pain. Over the past several weeks her left groin pain has returned and is currently 7/10. She has new complaint today and has left knee pain. This is began about the same time as the returning of her left hip and groin pain postinjection. Pain Scale 7/10 VAS. Location of pain left groin, left thigh and left knee. Pain is worse with weightbearing activities. Pain improves with rest.   Previous treatments have included acetaminophen    Review Of Systems:   Negative for fever or chills. Negative for numbness or tingling left lower extremity. Past Medical History:   Diagnosis Date    Anemia     iron defieciency    Asthma     Brain tumor ([facility] [address]     surgery for brain tumor 3/15/18    Hemorrhagic cyst of ovary 2007    Left    Hyperlipidemia     Tubular adenoma of colon 03/02/2018    COLONOSCOPY, DR CLAUDINE KHAN, TUBULAR ADENOMAS ASCENDING COLON, 3 MM, 4 MM, 5 MM REMOVED. Family History   Problem Relation Age of Onset    High Cholesterol Mother     Diabetes Mother     High Cholesterol Father     Diabetes Father     Cancer Maternal Grandmother         colon    Cancer Paternal Grandmother         colon cancer       Past Surgical History:   Procedure Laterality Date    COLONOSCOPY  03/02/2018    COLONOSCOPY, DR Ezequiel Pretty, 3 MM, 4 MM, 5 MM ASCENDING COLON POLYP REMOVED. LIMITED PREP. REPEAT 3-6 MONTHS.     EPIGASTRIC HERNIA REPAIR  10/30/2017    with mesh     OTHER SURGICAL HISTORY Left     lump removed from under left arm pit in her 30's    PITUITARY SURGERY N/A     brain surgery    TUBAL LIGATION Bilateral 0796    UMBILICAL HERNIA REPAIR  12/06/2016    and umbilectomy       Social History     Occupational History    Not on file   Tobacco Use    Smoking status: Never Smoker    Smokeless tobacco: Never Used   Substance and Sexual Activity    Alcohol use: No    Drug use: No    Sexual activity: Not on file       Current Outpatient Medications   Medication Sig Dispense Refill    acyclovir (ZOVIRAX) 200 MG capsule TAKE ONE CAPSULE BY MOUTH THREE TIMES DAILY 90 capsule 0    meloxicam (MOBIC) 15 MG tablet Take 1 tablet by mouth daily as needed for Pain 30 tablet 0    acetaminophen (TYLENOL) 500 MG tablet Take 2 tablets by mouth 3 times daily (with meals) 30 tablet 0    atorvastatin (LIPITOR) 10 MG tablet TAKE 1 TABLET BY MOUTH EVERY DAY 90 tablet 3    albuterol sulfate HFA (PROAIR HFA) 108 (90 Base) MCG/ACT inhaler Inhale 2 puffs into the lungs every 4 hours as needed for Wheezing 1 Inhaler 2    predniSONE (DELTASONE) 10 MG tablet 4 tabs x2 d 3 tabs x 2 d 2 tabs x2d 1 tab x 2 d in am with food. No NSAIDS while taking 30 tablet 0     No current facility-administered medications for this visit. Objective:     She is alert, oriented x 3, pleasant, well nourished, developed and in no   acute distress. Temp 97 °F (36.1 °C) (Infrared)   Ht 5' 8\" (1.727 m)   Wt 164 lb (74.4 kg)   LMP 02/07/2010   BMI 24.94 kg/m²      Examination of the left hip shows:  No discoloration, wounds or gross deformity. Palpation of the greater trochanter/bursa-nontender. Palpation of the anterior superior iliac spine-nontender. Palpation of the ischial tuberosity-nontender. Palpation over the sacroiliac joint-nontender.   ROM:  -Flexion 110                      (Nml 120-135 degrees)  -Extension 10                  (Nml 20-30 degrees)  -Abduction 30                  (Nml 40-50 degrees)  -Internal rotation 30         (Nml 30 degrees)  -External rotation 40 (Nml 50 degrees)    FADIR test (labral tear or DORINDA) negative. ANA test ( aka Randall\"s test) negative. Stinchfeld resisted hip flexion test positive. Fuad's test negative. Log roll test negative. Gait ( Nml, Antalgic, Trendelenberg)-antalgic. Examination of the left knee: Inspection of the skin without abrasion or laceration. Inspection of the soft tissues negative for swelling or erythema. The overall alignment of the knee is neutral.  Gait is antalgic. There is minimal intra-articular effusion. AROM:           PROM:  Extension-         0                   0  Flexion -           130                   130  There mild pain associated with ROM testing. Medial joint line mildly tender to palpation. Lateral joint line is not tender to palpation. Pes anserine bursa is not tender to palpation. Patellar tendon is not tender to palpation. Quadriceps tendon is not tender to palpation. Collateral ligaments is not tender to palpation. Popliteal fossa is not tender to palpation. Varus Stress testing negative for pain or crepitus. Valgus Stress testing negative for pain or crepitus. Lachman's test negative for  ACL laxity. Anterior Drawer test negative for ACL laxity. Posterior Drawer test negative for PCL laxity. Sanjeev's Test negative for meniscus tear. Patellar Compression testing negative for pain or crepitus. Extensor Mechanism is intact. Examination of the lower extremities are intact with sensation to light touch. Motor testing  5/5 in all major motor groups of the lower extremities. SLR negative. Examination of the lower extremities shows intact perfusion to all extremities. No cyanosis. Digits are warm to touch, capillary refill is less than 2 seconds. There is no edema noted. Examination of the skin over both lower extremities reveals: The skin to be intact without lacerations or abrasions. No significant erythema.   No rashes or skin lesions. X Rays: performed in the office today:   AP Standing,Lateral and Sunrise of left Knee: Normal radiographic study. The alignment is anatomic. There are no radiographic findings to suggest fracture or dislocation. Diagnosis:       ICD-10-CM    1. Left knee pain, unspecified chronicity  M25.562 XR KNEE LEFT (3 VIEWS)     20610 - RI DRAIN/INJECT LARGE JOINT/BURSA     RI TRIAMCINOLONE ACETONIDE INJ   2. Avascular necrosis of femur, unspecified laterality ([facility]  M87.059         Assessment and Plan:     Assessment:  She has documented avascular necrosis of both left and right hip. Left hip symptomatic, right hip is not. Recent left hip intra-articular injection provided significant relief of hip pain for 3 days. Symptoms have since returned. Now having left knee pain. Left knee examination unremarkable. X-rays left knee unremarkable. Left knee pain may be referred pain from her left hip AVN. I am recommending a trial injection in her left knee to help discern whether this is referred pain from the hip or a separate issue within the knee. 25 minutes was the total time spent on today's visit reviewing test results or histories in preparation for the visit, time spent on documentation, ordering prescriptions, tests, procedures after the visit. Plan:  1. Medications-no change. 2. PT- A home exercise program was instructed today including ROM exercises and strengthening exercises. The patient verbalized understanding of these exercises as well as the importance of the exercise program to promote return of normal function. If pain intensifies or other problems arise you are to notify the office. 3. Further Imaging-none. 4. Procedures-left knee intra-articular injection. Risk and benefits of corticosteroid intra-articular injection was discussed today. All questions were answered to her satisfaction.  rGaciela Matamoros verbally consented to proceed with intra-articular injection today.      Cortisone Injection                                                       PROCEDURE NOTE:     Pre op Diagnosis:  left knee pain     Post op Diagnosis: Same  With the patient's permission, her left knee was prepped  in standard sterile fashion with  Alcohol and 2 cc of 0.25% Marcaine and 1 cc of Kenalog 40 mg was injected into the left lateral compartment  without difficulty. The patient tolerated this well without difficulty. A band-aid was applied. The patient was advised to ice the knee for 15-20 minutes to relieve any injection site related pain. 5. Follow up- 4-6 weeks. 6. Call or return to clinic if these symptoms worsen or fail to improve as anticipated.

## 2021-01-25 RX ORDER — ACYCLOVIR 200 MG/1
CAPSULE ORAL
Qty: 90 CAPSULE | Refills: 0 | Status: SHIPPED | OUTPATIENT
Start: 2021-01-25 | End: 2021-05-03

## 2021-03-01 ENCOUNTER — OFFICE VISIT (OUTPATIENT)
Dept: FAMILY MEDICINE CLINIC | Age: 56
End: 2021-03-01
Payer: COMMERCIAL

## 2021-03-01 VITALS
SYSTOLIC BLOOD PRESSURE: 110 MMHG | BODY MASS INDEX: 24.25 KG/M2 | WEIGHT: 160 LBS | TEMPERATURE: 98.8 F | HEIGHT: 68 IN | DIASTOLIC BLOOD PRESSURE: 80 MMHG

## 2021-03-01 DIAGNOSIS — R53.83 FATIGUE, UNSPECIFIED TYPE: ICD-10-CM

## 2021-03-01 DIAGNOSIS — R00.1 BRADYCARDIA: ICD-10-CM

## 2021-03-01 DIAGNOSIS — R51.9 NONINTRACTABLE HEADACHE, UNSPECIFIED CHRONICITY PATTERN, UNSPECIFIED HEADACHE TYPE: ICD-10-CM

## 2021-03-01 DIAGNOSIS — R55 SYNCOPE, UNSPECIFIED SYNCOPE TYPE: Primary | ICD-10-CM

## 2021-03-01 LAB
A/G RATIO: 1.6 (ref 1.1–2.2)
ALBUMIN SERPL-MCNC: 4.6 G/DL (ref 3.4–5)
ALP BLD-CCNC: 65 U/L (ref 40–129)
ALT SERPL-CCNC: 10 U/L (ref 10–40)
ANION GAP SERPL CALCULATED.3IONS-SCNC: 13 MMOL/L (ref 3–16)
AST SERPL-CCNC: 12 U/L (ref 15–37)
BILIRUB SERPL-MCNC: 0.4 MG/DL (ref 0–1)
BUN BLDV-MCNC: 12 MG/DL (ref 7–20)
CALCIUM SERPL-MCNC: 9.9 MG/DL (ref 8.3–10.6)
CHLORIDE BLD-SCNC: 103 MMOL/L (ref 99–110)
CO2: 25 MMOL/L (ref 21–32)
CREAT SERPL-MCNC: 0.7 MG/DL (ref 0.6–1.1)
GFR AFRICAN AMERICAN: >60
GFR NON-AFRICAN AMERICAN: >60
GLOBULIN: 2.9 G/DL
GLUCOSE BLD-MCNC: 88 MG/DL (ref 70–99)
POTASSIUM SERPL-SCNC: 4.2 MMOL/L (ref 3.5–5.1)
SODIUM BLD-SCNC: 141 MMOL/L (ref 136–145)
T4 FREE: 1 NG/DL (ref 0.9–1.8)
TOTAL PROTEIN: 7.5 G/DL (ref 6.4–8.2)
TSH SERPL DL<=0.05 MIU/L-ACNC: 1.13 UIU/ML (ref 0.27–4.2)

## 2021-03-01 PROCEDURE — 99214 OFFICE O/P EST MOD 30 MIN: CPT | Performed by: NURSE PRACTITIONER

## 2021-03-01 PROCEDURE — 93000 ELECTROCARDIOGRAM COMPLETE: CPT | Performed by: NURSE PRACTITIONER

## 2021-03-01 ASSESSMENT — PATIENT HEALTH QUESTIONNAIRE - PHQ9
1. LITTLE INTEREST OR PLEASURE IN DOING THINGS: 0
SUM OF ALL RESPONSES TO PHQ9 QUESTIONS 1 & 2: 0

## 2021-03-01 NOTE — PATIENT INSTRUCTIONS
Patient Education        Fainting: Care Instructions  Your Care Instructions     When you faint, or pass out, you lose consciousness for a short time. A brief drop in blood flow to the brain often causes it. When you fall or lie down, more blood flows to your brain and you regain consciousness. Emotional stress, pain, or overheating--especially if you have been standing--can make you faint. In these cases, fainting is usually not serious. But fainting can be a sign of a more serious problem. Your doctor may want you to have more tests to rule out other causes. The treatment you need depends on the reason why you fainted. The doctor has checked you carefully, but problems can develop later. If you notice any problems or new symptoms, get medical treatment right away. Follow-up care is a key part of your treatment and safety. Be sure to make and go to all appointments, and call your doctor if you are having problems. It's also a good idea to know your test results and keep a list of the medicines you take. How can you care for yourself at home? · Drink plenty of fluids to prevent dehydration. If you have kidney, heart, or liver disease and have to limit fluids, talk with your doctor before you increase your fluid intake. When should you call for help? Call 911 anytime you think you may need emergency care. For example, call if:    · You have symptoms of a heart problem. These may include:  ? Chest pain or pressure. ? Severe trouble breathing. ? A fast or irregular heartbeat. ? Lightheadedness or sudden weakness. ? Coughing up pink, foamy mucus. ? Passing out. After you call 911, the  may tell you to chew 1 adult-strength or 2 to 4 low-dose aspirin. Wait for an ambulance. Do not try to drive yourself.     · You have symptoms of a stroke. These may include:  ? Sudden numbness, tingling, weakness, or loss of movement in your face, arm, or leg, especially on only one side of your body.   ? Sudden vision changes. ? Sudden trouble speaking. ? Sudden confusion or trouble understanding simple statements. ? Sudden problems with walking or balance. ? A sudden, severe headache that is different from past headaches.     · You passed out (lost consciousness) again. Watch closely for changes in your health, and be sure to contact your doctor if:    · You do not get better as expected. Where can you learn more? Go to https://ii4bpeNekted.Webcom. org and sign in to your "[employer] account. Enter Z997 in the [employer] box to learn more about \"Fainting: Care Instructions. \"     If you do not have an account, please click on the \"Sign Up Now\" link. Current as of: June 26, 2019               Content Version: 12.6  © 3685-8890 [employer]. Care instructions adapted under license by [facility] ([facility]). If you have questions about a medical condition or this instruction, always ask your healthcare professional. [place] [address] any warranty or liability for your use of this information. Patient Education        Vasovagal Syncope: Care Instructions  Your Care Instructions     Vasovagal syncope (say \"nao-gcu-IIS-gul UQDZ-vod-elv\")is sudden dizziness or fainting that can be set off by things such as pain, stress, fear, or trauma. You may sweat or feel lightheaded, sick to your stomach, or tingly. The problem causes the heart rate to slow and the blood vessels to widen, or dilate, for a short time. When this happens, blood pools in the lower body, and less blood goes to the brain. You can usually get relief by lying down with your legs raised (elevated). This helps more blood to flow to your brain and may help relieve symptoms like feeling dizzy. Some doctors may recommend a technique that involves tensing your fists and arms. This type of fainting is often easy to predict. For example, it happens to some people when they see blood or have to get a shot.  They may feel symptoms before they faint. An episode of vasovagal syncope usually responds well to self-care. Other treatment often isn't needed. But if the fainting keeps happening, your doctor may suggest further treatments. Follow-up care is a key part of your treatment and safety. Be sure to make and go to all appointments, and call your doctor if you are having problems. It's also a good idea to know your test results and keep a list of the medicines you take. How can you care for yourself at home? · Drink plenty of fluids to prevent dehydration. If you have kidney, heart, or liver disease and have to limit fluids, talk with your doctor before you increase your fluid intake. · Try to avoid things that you think may set off vasovagal syncope. · Talk to your doctor about any medicines you take. Some medicines may increase the chance of this condition occurring. · If you feel symptoms, lie down with your legs raised. Talk to your doctor about what to do if your symptoms come back. When should you call for help? Call 911 anytime you think you may need emergency care. For example, call if:    · You have symptoms of a heart problem. These may include:  ? Chest pain or pressure. ? Severe trouble breathing. ? A fast or irregular heartbeat. Watch closely for changes in your health, and be sure to contact your doctor if:    · You have more episodes of fainting at home.     · You do not get better as expected. Where can you learn more? Go to https://Top [employer]Feidee. org and sign in to your [employer] account. Enter L754 in the [facility] box to learn more about \"Vasovagal Syncope: Care Instructions. \"     If you do not have an account, please click on the \"Sign Up Now\" link. Current as of: June 26, 2019               Content Version: 12.6  © 7871-8106 [employer]. Care instructions adapted under license by [facility] ([facility]).  If you have questions about a medical condition or this

## 2021-03-01 NOTE — PROGRESS NOTES
holter [address] (:  1965) is a 50 y.o. female,Established patient, here for evaluation of the following chief complaint(s): Other (PASSED OUT LAST NIGHT, DOES NOT REMEMBER WHAT HAPPENED JUST KNOWS SHE WOKE UP ON THE FLOOR AFTER TAKING IRON TO DAUGHTERS ROOM, WHEN SHE TRIED TO GET UP HEAD FELT SO HEAVY  FOUND HER ON HER SIDE NOT SURE HOW LONG SHE WAS OUT.  SAID EYES WERE OPEN DOES NOT THINK SHE HIT HER HEAD. AFTER SHE WAS VERY [address] was seen today for other. Diagnoses and all orders for this visit:    Syncope, unspecified syncope type  EKG 12 Lead; Future bradycardia  Encouraged to increase water intake to at least 80 oz daily  COMPREHENSIVE METABOLIC PANEL; Future  Instructed to go to ER if she faints again call office with any concerns      Nonintractable headache, unspecified chronicity pattern, unspecified headache type  -     CT HEAD WO CONTRAST; Future    Bradycardia  -     Holter Monitor 24 Hour; Future  Consider referral to cardiology pending review of results    Fatigue, unspecified type  -     TSH without Reflex; Future  -     T4, FREE; Future  -     T4, FREE  -     TSH without Reflex        SUBJECTIVE/OBJECTIVE:  HPI  PT STATES SHE PASSED OUT AFTER TRYING TO TAKE HER DAUGHTER SOMETHING-   SAID SHE FELL DOWN LIKE A SACK OF POTATOES - NO ABNORMAL MOVEMENTS NOTED HE HAD TO HELP HER GET UP HER HEAD FELT HEAVY  AND SHE HAS SOME ACHING  PAIN IN THE BACK OF HER HEAD NOT TENDER TO TOUCH HAS  A HEADACHE MORE LIKE PRESSURE IN THE FRONT OF HER HEADBUT SHE FELL ON HER SIDE-NO NAUSEA OR VISION CHANGES NOTED-  SHE DID NOT PASS OUT PER HER  BUT SHE DOES NOT REMEMBER - SHE FELT DRAINED AND COLD - RIGHT KNEE PAIN SOON AFTER IBUPROFEN HELPED BUT HER KNEE FELT SQUISHY SHE IS ALERT AND ORIENTED   Review of Systems   Neurological: Positive for syncope and headaches. All other systems reviewed and are negative. Physical Exam  Vitals signs reviewed. Constitutional:       General: She is awake. She is not in acute distress. Appearance: Normal appearance. She is well-developed, well-groomed and normal weight. She is not ill-appearing, toxic-appearing or diaphoretic. Eyes:      Funduscopic exam:     Right eye: Red reflex present. Left eye: Red reflex present. Neck:      Vascular: No carotid bruit. Cardiovascular:      Rate and Rhythm: Normal rate and regular rhythm. Heart sounds: Normal heart sounds, S1 normal and S2 normal. Heart sounds not distant. No murmur. No systolic murmur. No diastolic murmur. No friction rub. No gallop. No S3 or S4 sounds. Neurological:      Mental Status: She is alert and oriented to person, place, and time. Psychiatric:         Attention and Perception: Attention and perception normal.         Mood and Affect: Mood and affect normal.         Speech: Speech normal.         Behavior: Behavior normal. Behavior is cooperative. Thought Content: Thought content normal.         Cognition and Memory: Cognition and memory normal.         Judgment: Judgment normal.               An electronic signature was used to authenticate this note.     --SHON Blackmon - CNP

## 2021-03-01 NOTE — LETTER
[address] [address]  Phone: [phone]  Fax: [phone]    SHON Chowdhury CNP        March 1, 2021     Patient: Krystal Garay   YOB: 1965   Date of Visit: 3/1/2021       To Whom it May Concern:    Krystal Garay was seen in my clinic on 3/1/2021. She may return to work on 3/3/2021. If you have any questions or concerns, please don't hesitate to call.     Sincerely,           SHON Chowdhury CNP

## 2021-03-02 ENCOUNTER — HOSPITAL ENCOUNTER (OUTPATIENT)
Dept: NEUROLOGY | Age: 56
Discharge: HOME | End: 2021-03-02
Payer: COMMERCIAL

## 2021-03-02 DIAGNOSIS — R00.1 BRADYCARDIA: ICD-10-CM

## 2021-03-02 PROCEDURE — 93226 XTRNL ECG REC<48 HR SCAN A/R: CPT

## 2021-03-02 PROCEDURE — 93225 XTRNL ECG REC<48 HRS REC: CPT

## 2021-03-03 ENCOUNTER — TELEPHONE (OUTPATIENT)
Dept: FAMILY MEDICINE CLINIC | Age: 56
End: 2021-03-03

## 2021-03-03 NOTE — TELEPHONE ENCOUNTER
SHE NEEDS A NOTE  FOR WORK   FROM  03/01/21 - 03/05/21     SHE WOULD LIKE TO PICK THIS UP TODAY    PT @  [phone]

## 2021-03-03 NOTE — LETTER
[address] [address] [address] [place] [address]  Phone: [phone]  Fax: 280 [facility], [address] [place] [address], APRN - CNP        March 3, 2021     Patient: Ole Hammond   YOB: 1965   Date of Visit: 03/01/2021       To Whom it May Concern:    Ole Hammond was seen in my clinic on 03/01/2021. She may return to work on 03/08/2021. If you have any questions or concerns, please don't hesitate to call.     Sincerely,         SHON Moore CNP

## 2021-03-04 DIAGNOSIS — R00.1 BRADYCARDIA: Primary | ICD-10-CM

## 2021-03-04 DIAGNOSIS — R55 SYNCOPE, UNSPECIFIED SYNCOPE TYPE: ICD-10-CM

## 2021-03-04 LAB
ACQUISITION DURATION: NORMAL S
AVERAGE HEART RATE: 66 BPM
EKG DIAGNOSIS: NORMAL
HOLTER MAX HEART RATE: 103 BPM
HOOKUP DATE: NORMAL
HOOKUP TIME: NORMAL
MAX HEART RATE TIME/DATE: NORMAL
MIN HEART RATE TIME/DATE: NORMAL
MIN HEART RATE: 45 BPM
NUMBER OF QRS COMPLEXES: NORMAL
NUMBER OF SUPRAVENTRICULAR BEATS IN RUNS: 0
NUMBER OF SUPRAVENTRICULAR COUPLETS: 0
NUMBER OF SUPRAVENTRICULAR ECTOPICS: 12
NUMBER OF SUPRAVENTRICULAR ISOLATED BEATS: 12
NUMBER OF SUPRAVENTRICULAR RUNS: 0
NUMBER OF VENTRICULAR BEATS IN RUNS: 0
NUMBER OF VENTRICULAR BIGEMINAL CYCLES: 0
NUMBER OF VENTRICULAR COUPLETS: 0
NUMBER OF VENTRICULAR ECTOPICS: 5
NUMBER OF VENTRICULAR ISOLATED BEATS: 5
NUMBER OF VENTRICULAR RUNS: 0

## 2021-03-06 ENCOUNTER — HOSPITAL ENCOUNTER (OUTPATIENT)
Dept: CT IMAGING | Age: 56
Discharge: HOME | End: 2021-03-06
Payer: COMMERCIAL

## 2021-03-06 DIAGNOSIS — R51.9 NONINTRACTABLE HEADACHE, UNSPECIFIED CHRONICITY PATTERN, UNSPECIFIED HEADACHE TYPE: ICD-10-CM

## 2021-03-06 DIAGNOSIS — R55 SYNCOPE, UNSPECIFIED SYNCOPE TYPE: ICD-10-CM

## 2021-03-06 PROCEDURE — 70450 CT HEAD/BRAIN W/O DYE: CPT
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
## 2021-03-17 ENCOUNTER — OFFICE VISIT (OUTPATIENT)
Dept: CARDIOLOGY CLINIC | Age: 56
End: 2021-03-17
Payer: COMMERCIAL

## 2021-03-17 VITALS
HEART RATE: 67 BPM | OXYGEN SATURATION: 98 % | TEMPERATURE: 97.1 F | HEIGHT: 68 IN | SYSTOLIC BLOOD PRESSURE: 118 MMHG | BODY MASS INDEX: 24.74 KG/M2 | DIASTOLIC BLOOD PRESSURE: 68 MMHG | WEIGHT: 163.2 LBS

## 2021-03-17 DIAGNOSIS — R55 SYNCOPE AND COLLAPSE: ICD-10-CM

## 2021-03-17 DIAGNOSIS — R55 SYNCOPE AND COLLAPSE: Primary | ICD-10-CM

## 2021-03-17 DIAGNOSIS — D50.9 MICROCYTIC ANEMIA: ICD-10-CM

## 2021-03-17 DIAGNOSIS — E78.2 MIXED HYPERLIPIDEMIA: ICD-10-CM

## 2021-03-17 DIAGNOSIS — Z12.31 ENCOUNTER FOR SCREENING MAMMOGRAM FOR MALIGNANT NEOPLASM OF BREAST: Primary | ICD-10-CM

## 2021-03-17 LAB
HCT VFR BLD CALC: 32.1 % (ref 36–48)
HEMOGLOBIN: 10.3 G/DL (ref 12–16)
MCH RBC QN AUTO: 23.1 PG (ref 26–34)
MCHC RBC AUTO-ENTMCNC: 32.1 G/DL (ref 31–36)
MCV RBC AUTO: 71.8 FL (ref 80–100)
PDW BLD-RTO: 17.4 % (ref 12.4–15.4)
PLATELET # BLD: 384 K/UL (ref 135–450)
PMV BLD AUTO: 7.3 FL (ref 5–10.5)
RBC # BLD: 4.47 M/UL (ref 4–5.2)
WBC # BLD: 5.2 K/UL (ref 4–11)

## 2021-03-17 PROCEDURE — 99204 OFFICE O/P NEW MOD 45 MIN: CPT | Performed by: INTERNAL MEDICINE

## 2021-03-17 NOTE — PATIENT INSTRUCTIONS
Patient Education        Anemia: Care Instructions  Your Care Instructions     Anemia is a low level of red blood cells, which carry oxygen throughout your body. Many things can cause anemia. Lack of iron is one of the most common causes. Your body needs iron to make hemoglobin, a substance in red blood cells that carries oxygen from the lungs to your body's cells. Without enough iron, the body produces fewer and smaller red blood cells. As a result, your body's cells do not get enough oxygen, and you feel tired and weak. And you may have trouble concentrating. Bleeding is the most common cause of a lack of iron. You may have heavy menstrual bleeding or bleeding caused by conditions such as ulcers, hemorrhoids, or cancer. Regular use of aspirin or other anti-inflammatory medicines (such as ibuprofen) also can cause bleeding in some people. A lack of iron in your diet also can cause anemia, especially at times when the body needs more iron, such as during pregnancy, infancy, and the teen years. Your doctor may have prescribed iron pills. It may take several months of treatment for your iron levels to return to normal. Your doctor also may suggest that you eat foods that are rich in iron, such as meat and beans. There are many other causes of anemia. It is not always due to a lack of iron. Finding the specific cause of your anemia will help your doctor find the right treatment for you. Follow-up care is a key part of your treatment and safety. Be sure to make and go to all appointments, and call your doctor if you are having problems. It's also a good idea to know your test results and keep a list of the medicines you take. How can you care for yourself at home? · Take your medicines exactly as prescribed. Call your doctor if you think you are having a problem with your medicine.   · If your doctor recommends iron pills, take them as directed:  ? Try to take the pills on an empty stomach about 1 hour before or increased shortness of breath.     · You are dizzy or lightheaded, or you feel like you may faint.     · Your fatigue and weakness continue or get worse.     · You have any abnormal bleeding, such as:  ? Nosebleeds. ? Vaginal bleeding that is different (heavier, more frequent, at a different time of the month) than what you are used to.  ? Bloody or black stools, or rectal bleeding. ? Bloody or pink urine. Watch closely for changes in your health, and be sure to contact your doctor if:    · You do not get better as expected. Where can you learn more? Go to [email or web address]://[email or web address][employer] Entendipepiceweb.[email or web address]. org and sign in to your [employer] Database account. Enter R301 in the FITiST box to learn more about \"Anemia: Care Instructions. \"     If you do not have an account, please click on the \"Sign Up Now\" link. Current as of: September 23, 2020               Content Version: 12.8  © 2006-2021 Healthwise, [employer]. Care instructions adapted under license by [facility] ([facility]). If you have questions about a medical condition or this instruction, always ask your healthcare professional. [place] [address] any warranty or liability for your use of this information.

## 2021-03-22 ENCOUNTER — PREP FOR PROCEDURE (OUTPATIENT)
Dept: ORTHOPEDIC SURGERY | Age: 56
End: 2021-03-22

## 2021-03-22 ENCOUNTER — OFFICE VISIT (OUTPATIENT)
Dept: ORTHOPEDIC SURGERY | Age: 56
End: 2021-03-22
Payer: COMMERCIAL

## 2021-03-22 VITALS
WEIGHT: 163 LBS | SYSTOLIC BLOOD PRESSURE: 117 MMHG | HEIGHT: 68 IN | TEMPERATURE: 97.2 F | BODY MASS INDEX: 24.71 KG/M2 | DIASTOLIC BLOOD PRESSURE: 72 MMHG | HEART RATE: 64 BPM

## 2021-03-22 DIAGNOSIS — M87.059 AVASCULAR NECROSIS OF FEMUR, UNSPECIFIED LATERALITY (HCC): Primary | ICD-10-CM

## 2021-03-22 DIAGNOSIS — M87.052 AVASCULAR NECROSIS OF LEFT FEMUR (HCC): Primary | ICD-10-CM

## 2021-03-22 PROCEDURE — 99213 OFFICE O/P EST LOW 20 MIN: CPT | Performed by: ORTHOPAEDIC SURGERY

## 2021-03-22 NOTE — LETTER
[facility]  Surgery Scheduling Form:  [facility]    DEMOGRAPHICS:                                                                                                                  Patient Name:  Nicole Villalba  Patient :  1965   Patient SS#:      Patient Phone:  [phone] (home)                             Patient Address:  [address] [place] [address]    PCP:  SHON Shabazz CNP  Insurance:   Payor/Plan Subscr  Sex Relation Sub. Ins. ID Effective Group Num   1.  [address] 1965 Female Spouse [id number] 1/1/[birth year] [id number]                                    [id number]     DIAGNOSIS & PROCEDURE:                                                                                              Diagnosis:   Left hip arthritis    M16.12  Operation:  Left Anterior Total Hip Replacement with C-Arm   [address]  Location: [facility]  Surgeon:  Fay Sosa MD    [facility] INFORMATION:                                                                                         .  Surgeon's Scheduling Instruction:  elective    Requested Date:    OR Time:  9:25 Patient Arrival Time:      OR Time Required:  80  Minutes  Anesthesia:  General  Equipment:  Anderson Table, Depuy, advanced            COVID:   4-28  Mini C-Arm:  No   Standard C-Arm:  Yes  Status:  same day admit  PAT Required:  Yes  Comments: NO femoral nerve block    ALLERGIES:Other and Contrast [iodides]                     Marjan Stephens MD      3/22/21 9:43 AM  BILLING INFORMATION:                                                                                                       Procedure:       CPT Code Modifier    With Janine Dean [id number] [place]                                            H&P AT :      PCP ___XX__                  URGENT CARE ______    [address] REPLACEMENT               1965 PHYSICIANS ORDERS                                     HEIGHT:   5'8              WEIGHT:  163       ALLERGIES:Other and Contrast [iodides]      SURG   5-4          JET     4-26                        ________________________________________________________________  PRE-OP ORDERS:  ? CBC WITH DIFFERENTIAL                                                ? TYPE AND SCREEN   ? VITAMIN D LEVELS  ? PREALBUMIN AND ALBUMIN LEVELS                                                           ? HgB A1C                                                                               ? EKG                                                                                        ? NASAL CULUTRE MRSA  ? UAR/if positive repeat UAR on admissioin  ? BMP  ? COAG PROFILE  ? SED RATE  ? PT/OT EVAL AND TEACHING  ? INSTRUCT PT TO STOP ALL NSAIDS, ASPIRIN, BLOOD THINNERS 7 DAYS PRIOR SURGERY  DAY OF SURGERY  ? CEFAZOLIN 2 GM IVPB; IF PATIENT WEIGHS > 80 KG AND SERUM CREATININE <2.5 mg/dl, GIVE 2 GM DOSE WITHIN 1 HOUR OF INCISION. ? IF THE PRE-OP NASAL CULTURE FOR MRSA WAS POSITIVE:   REPEAT NASAL SWAB ON ADMISSION AND ADMINISTER VANCOMYCIN 15 mg X kg, REDUCE THE DOSE OF VANCOMYCIN  MG IVPB IF PT < 55 KG OR SERUM CREATININE > 2mg/dl; Also to get 2 GM Cefazolin or wt based  ? All patients will receive preop Cefazolin 2 GM or wt based  ? APPLY KNEE HIGH ANTI-EMBOLIC AND PNEUMO-BOOTS TO UNOPERATIVE  LEG  ? CELEBREX  200 MG  ORALLY  DAY OF SURGERY  ?  ROXICODONE 10MG  ORALLY DAY OF SURGERY    OTHER ORDERS:  ____________________________________________________________  PHYSICIAN SIGNATURE:   3/22/2021 9:43 AM   __________________________DATE:                    [facility] Phys[facility]  [facility]  [address], [address] [place] [address], [facility] [address] [facility]  [phone] (phone)  [phone] (Fax)                                                                                               3-29-21    Cardiac Clearance Request Form                                DR. Cherry Mayen                   Our mutual patient  [address]   1965      Is scheduled for an elective procedure: LEFT HIP REPLACEMENT      Date:      21                   with Dr. Christell Goldberg    _xx___               They will be receiving    GENERAL  XX                   MAC/TIVA                  SPINAL       Office :  Jonh Turners  -                  Phone Number:  [phone]      We are requesting:      Cardiac Clearance with Anticoagulation Recommendations discontinue 7 days prior to procedure      Please fax this sheet to [phone]: [address] [facility], [address], [address] [place] [address] Rd  [phone] (phone)  [phone] (Fax)                                                                                    3-29-21  ENDOCRINOLOGY  Clearance Request Form                DR. Bernie Busby                       Our mutual patient  [address]   1965      Is scheduled for an elective procedure:  LEFT HIP REPLACEMENT     Date:   21                      with Dr. Christell Goldberg    __xx__           They will be receiving    GENERAL   XX                  MAC/TIVA                  SPINAL  for sedation. Office :  Jonh Kartik  -                  Phone Number:  [phone]      We are requesting:      ENDOCRINOLOGY CLEARANCE FOR SURGERY    Please fax this sheet to [phone]: [facility]  [facility]  [address] [facility], [address], [address] [place] [address] Rd  [phone] (phone)  [phone] (Fax)                                               3-29-21    Neurological Request Form                     DR. David Berry                               Our mutual patient  [address]   1965 Is scheduled for an elective procedure:     LEFT TOTAL HIP REPLACEMENT      Date:    5-4-21              with Dr. Yoli Marin    _XX___    [facility]              They will be receiving    GENERAL  XX                   MAC/TIVA                  SPINAL      Office :  Maria Luisa Goldstein  -                  Phone Number:  [phone]      We are requesting:   Clearance for surgery          Please fax this sheet to [phone]                   ATTN: Maria Luisa Goldstein

## 2021-03-22 NOTE — PROGRESS NOTES
[facility]  Office Visit    Chief Complaint: Left hip pain    HPI:  Jason Wyman is a 50 y.o. who is here for evaluation of left hip pain. She has a known history of avascular necrosis in bilateral femoral heads and has been more symptomatic on the left. She did have a intra-articular steroid injection with Dr. Kristina Murray in November 2020 which helped for 3 days. She currently rates the pain a 7/10. There is no history of injury or surgery to the hip. She did have a history of pituitary adenoma in 2018 and was on high-dose steroids at that time. The patient has difficulty putting on socks and shoes, difficulty getting out of a car, difficulty with ambulation and doing activities of daily living including pain at night. Pain at rest is 7 and with activities 9-10/10. She denies a history of diabetes, blood clot, anticoagulants, hormone replacement therapy, tobacco use, low back pain, narcotic drug usage, heart or lung issues, sleep apnea. She does continue to work. She walks without assistive device. Patient Active Problem List   Diagnosis    Hyperlipidemia    Anemia    Vitamin D deficiency    Hypertriglyceridemia    Asthma    Pituitary adenoma with extrasellar extension ([address]    Avascular necrosis of femur ([address]    Left knee pain       ROS:  Constitutional: denies fever, chills, weight loss  MSK: denies pain in other joints, muscle aches  Neurological: denies numbness, tingling, weakness    Exam:  Blood pressure 117/72, pulse 64, temperature 97.2 °F (36.2 °C), temperature source Temporal, height 5' 8\" (1.727 m), weight 163 lb (73.9 kg), last menstrual period 02/07/2010, not currently breastfeeding.     Appearance: sitting in exam room chair, appears to be in no acute distress, awake and alert  Resp: unlabored breathing on room air  Skin: warm, dry and intact with out erythema or significant increased temperature  Neuro: grossly intact both lower extremities. Intact sensation to light touch. Motor exam 4+ to 5/5 in all major motor groups. Left hip: Examination demonstrates pain with logroll and Stinchfield. There is brisk capillary refill. There are 2+ dorsalis pedis and posterior tibial pulses. Strength is 5/5 in hamstrings, quads, hip flexors. Imaging:  AP pelvis, AP and frog-leg lateral views of left hip were performed and interpreted today. Significant for avascular necrosis of the left femoral head without collapse. Assessment:  Left femoral head avascular necrosis     Plan:  We discussed the diagnosis and treatment options. She is having hip pain on a daily basis despite conservative management. We discussed anterior left total hip arthroplasty. The operative procedure, alternatives, and risks were discussed in detail with the patient. The risks include but are not limited to: Infection, vessel injury, nerve injury, DVT, pulmonary embolism, implant loosening, need for revision surgery, leg length discrepancy, dislocation, lateral femoral cutaneous nerve palsy, intraoperative fracture. Despite these risks the patient would like to proceed. All questions have been answered and no guarantees were made. I discussed with the patient the diagnosis in detail and answered all questions. The patient verbalized understanding of the plan as it has been described above and is in agreement. Plan for anterior left total hip arthroplasty. Total time spent on today's encounter was at least 24 minutes. This time included reviewing prior notes, radiographs, and lab results when available, reviewing history obtained by medical assistant, performing history and physical exam, reviewing tests/radiographs with the patient, counseling the patient, ordering medications or tests, documentation in the electronic health record, and coordination of care.     This dictation was done with Dragon dictation and may contain mechanical errors related to translation.

## 2021-03-29 ENCOUNTER — TELEPHONE (OUTPATIENT)
Dept: CARDIOLOGY CLINIC | Age: 56
End: 2021-03-29

## 2021-03-29 NOTE — TELEPHONE ENCOUNTER
Cardiac Clearance Request    Procedure: Right Total Knee Replacement  Cardiologist:Howard  Last Appointment:3/17/2021  Next Appointment:none  How long do the blood thinners need to be held?: not currently on any  Cardiac History: no recent cardiac procedures    Pt was seen once in office for new patient appointment for a syncopal episode.     Fernanda Carroll  Phone: [phone]  Fax: [phone]

## 2021-04-01 ENCOUNTER — HOSPITAL ENCOUNTER (OUTPATIENT)
Dept: WOMENS IMAGING | Age: 56
Discharge: HOME | End: 2021-04-01
Payer: COMMERCIAL

## 2021-04-01 DIAGNOSIS — Z12.31 ENCOUNTER FOR SCREENING MAMMOGRAM FOR MALIGNANT NEOPLASM OF BREAST: ICD-10-CM

## 2021-04-01 PROCEDURE — 77063 BREAST TOMOSYNTHESIS BI: CPT

## 2021-04-05 ENCOUNTER — HOSPITAL ENCOUNTER (OUTPATIENT)
Dept: PHYSICAL THERAPY | Age: 56
Setting detail: THERAPIES SERIES
Discharge: HOME | End: 2021-04-05
Payer: COMMERCIAL

## 2021-04-05 PROCEDURE — 97110 THERAPEUTIC EXERCISES: CPT

## 2021-04-05 PROCEDURE — 97161 PT EVAL LOW COMPLEX 20 MIN: CPT

## 2021-04-05 PROCEDURE — 97116 GAIT TRAINING THERAPY: CPT

## 2021-04-05 NOTE — FLOWSHEET NOTE
[address] [facility] [address]  [address]  Phone: [phone]   Fax:     [phone]    Physical Therapy Treatment Note/ Progress Report:     Date:  2021    Patient Name:  America Huff    :  1965  MRN: [id number]    Pertinent Medical History:     Medical/Treatment Diagnosis Information:  Diagnosis: Avascular necrosis of left femur ([address] (M87.052  Treatment Diagnosis: Left hip weakness limiting tolerance to walking, standing and adl's    Insurance/Certification information:  PT Insurance Information: Bentley Michel  Physician Information:  Referring Practitioner: Dr Jonh Fan of care signed (Y/N): routed 21    Date of Patient follow up with Physician:      Progress Report: []  Yes  [x]  No     Date Range for reporting period:  Beginnin2021  Ending:      Progress report due (10 Rx/or 30 days whichever is less): 86     Recertification due (POC duration/ or 90 days whichever is less):     Visit # POC/Insurance Allowable Auth Needed   1 BOMN []Yes   []No     Latex Allergy:  [x]NO      []YES  Preferred Language for Healthcare:   [x]English       []Other:    Functional Scale:      Date assessed: at eval  Test:  Score:    Pain level:  4/10     History of Injury:     SUBJECTIVE:  See eval    OBJECTIVE: see eval   Test measurements:    Functional testing Prehab        Date    Re eval   post op Date  4 week f/u   Date    8 week f/u  Date    D/C  Date           TUG        30 second sit to stand        6 minute walk                Balance        Narrow DARIN        Semi tandem        Tandem         SLS                Knee AROM        Knee Extension MMT R/L L=  R=       Hip aBduction MMT R/L L=  R=       LEFS           RESTRICTIONS/PRECAUTIONS:     Exercises/Interventions:     Therapeutic Ex (48825)   Min: Reps/Resistance Notes/CUES   Heel slides  15 x    Gluteal sets  5\" 15 x Bridges  5-10\" x 10     Clams  L 20 x                                                 Manual Intervention (03285)  Min:          NMR re-education (60244)  Min:                                             Therapeutic Activity (63866)  Min:          Gait Training (42026)  Min:       The patient was educated on and practiced gait with a rolling walker on a level surface. They demonstrated proper technique, good safety awareness and good posture following the instruction. Balance was observed to be good. All patient questions regarding gait were answered. The patient was educated on stairs using railings and one step at a time for both ascending and descending stairs. With the LE as the operative leg they were instruction to lead up the strong LE and lead down steps with the weaker (operative LE). \"Up with the good and down with the bad\" was used as a quip to help the patient remember the sequence. They demonstrated proper technique and verbalized understanding to stair climbing instruction. All patient questions regarding stairs were answered. Modalities  Min:            Other Therapeutic Activities: Patient was thoroughly educated on this date regarding prehabilitation goals, importance of PT sessions in improving overall ROM, strength and stability prior to surgery, and how prehabilitation will facilitate improved post-operative outcomes. The patient was educated on and instructed in HEP as listed. The patient was given a detailed handout for exercises to initiate in the hospital post-operatively as well as at home. The discharge plan from the hospital was reviewed with the patient; specifically, to reduce length of hospital stay and to minimize time before reinitiating outpatient physical therapy after surgery. Education regarding early mobility post-operatively in the hospital and emphasis on working with both physical therapy and nursing staff to achieve ambulation goal was provided.  The training and instruction to the patient for proper postural muscle recruitment and positioning with ambulation re-education     Home Exercise Program:    [x] (83252) Reviewed/Progressed HEP activities related to strengthening, flexibility, endurance, ROM of core, proximal hip and LE for functional self-care, mobility, lifting and ambulation/stair navigation   [] (54753)Reviewed/Progressed HEP activities related to improving balance, coordination, kinesthetic sense, posture, motor skill, proprioception of core, proximal hip and LE for self care, mobility, lifting, and ambulation/stair navigation      Manual Treatments:  PROM / STM / Oscillations-Mobs:  G-I, II, III, IV (PA's, Inf., Post.)  [] (28336) Provided manual therapy to mobilize LE, proximal hip and/or LS spine soft tissue/joints for the purpose of modulating pain, promoting relaxation,  increasing ROM, reducing/eliminating soft tissue swelling/inflammation/restriction, improving soft tissue extensibility and allowing for proper ROM for normal function with self care, mobility, lifting and ambulation.      Charges:  Timed Code Treatment Minutes: 25   Total Treatment Minutes: 45      [x] EVAL (LOW) 01294 (typically 20 minutes face-to-face)  [] EVAL (MOD) 78543 (typically 30 minutes face-to-face)  [] EVAL (HIGH) 80531 (typically 45 minutes face-to-face)  [] RE-EVAL     [x] DELANO(18776) x     [] Dry needle 1 or 2 Muscles (25976)  [] NMR (12248) x     [] Dry needle 3+ Muscles (70699)  [] Manual (37448) x     [] Ultrasound (26419) x  [] TA (76846) x     [] [facility] T[facility] (07097)  [] ES(attended) (09411)     [] ES (un) (91226):   [] Vasopump (72165) [] Ionto (46995)   [x] Gait (67204)  [] Other:    GOALS:    ASSESSMENT:  See eval    Treatment/Activity Tolerance:  [x] Patient tolerated treatment well [] Patient limited by fatique  [] Patient limited by pain  [] Patient limited by other medical complications  [] Other:     Overall Progression Towards Functional goals/ Treatment Progress Update:  [x] Patient is progressing as expected towards functional goals listed. [] Progression is slowed due to complexities/Impairments listed. [] Progression has been slowed due to co-morbidities. [] Plan just implemented, too soon to assess goals progression <30days   [] Goals require adjustment due to lack of progress  [] Patient is not progressing as expected and requires additional follow up with physician  [] Other    Prognosis for POC: [x] Good [] Fair  [] Poor    Patient requires continued skilled intervention: [] Yes  [x] No        PLAN:  Patient will be  seen for 2 visits to complete  HEP. (Address pelvic obliquity with trunk rotation and ITB stretching)        Patient is a good candidate for THR surgery and would benefit from outpatient rehab following surgery. [x] Continue per plan of care [] Alter current plan (see comments)  [] Plan of care initiated [] Hold pending MD visit [] Discharge    Electronically signed by: Sancho Alvarado PT    Note: If patient does not return for scheduled/recommended follow up visits, this note will serve as a discharge from care along with the most recent update on progress.

## 2021-04-05 NOTE — PROGRESS NOTES
[facility] [place]. [address] [address]  Phone: [phone]   Fax:     [phone]          Physical Therapy Certification    Dear Referring Practitioner: Dr Jose Miguel Em,    We had the pleasure of evaluating the following patient for physical therapy services at [facility]. A summary of our findings can be found in the initial assessment below. This includes our plan of care. If you have any questions or concerns regarding these findings, please do not hesitate to contact me at the office phone number checked above.   Thank you for the referral.       Physician Signature:_______________________________Date:__________________  By signing above (or electronic signature), therapists plan is approved by physician        Patient: Disha Melchor   : 1965   MRN: [id number]  Referring Physician: Referring Practitioner: Dr Jose Miguel Em      Evaluation Date: 2021      Medical Diagnosis Information:  Diagnosis: Avascular necrosis of left femur ([address] (M87.052   Treatment Diagnosis: Left hip weakness limiting tolerance to walking, standing and adl's                                         Insurance information: PT Insurance Information: BCBS     Precautions/ Contra-indications:   Latex Allergy:  [x]NO      []YES  Preferred Language for Healthcare:   [x]English       []other:    C-SSRS Triggered by Intake questionnaire (Past 2 wk assessment ):   [x] No, Questionnaire did not trigger screening.   [] Yes, Patient intake triggered C-SSRS Screening      [] C-SSRS Screening completed  [] PCP notified via Epic     SUBJECTIVE: Patient stated complaint:c/o constant left hip pain that is aggravated with activity    Relevant Medical History:   Functional Outcome Measure: LEFS= 33     Pain Scale: 4/10  Easing factors: sitting down  Provocative factors: walking/ standing/ physical activity level?     [] Highly Active [] Active  [x] Somewhat active  [] Sedentary     OBJECTIVE:   Palpation: ttp at left trochanter , Right pelvis obliquely higher at right     Quad: good tone     Functional Mobility/Transfers: independent     Posture: good    Bandages/Dressings/Incisions: n/a    Gait: walks without a device    Dermatomes Normal Abnormal Comments   inguinal area (L1)       anterior mid-thigh (L2)      distal ant thigh/med knee (L3)      medial lower leg and foot (L4)      lateral lower leg and foot (L5)      posterior calf (S1)      medial calcaneus (S2)          Myotomes Normal Abnormal Comments   Hip flexion (L1-L2)      Knee extension (L2-L4)      Dorsiflexion (L4-L5)      Great Toe Ext (L5)      Ankle Eversion (S1-S2)      Ankle PF(S1-S2)          Reflexes Normal Abnormal Comments   S1-2 Seated achilles      S1-2 Prone knee bend      L3-4 Patellar tendon      Clonus      Babinski           PROM AROM    L R L R   Hip Flexion   125 125   Knee Flexion   134 132   Knee Extension   0 0       Strength (0-5) Left Right   Hip Flexion - supine     Hip Flexion - seated     Hip Abduction - sidelyling 52# 41#   Hip Adduction     Hip Extension     Quads 72# 51#   Hamstrings          Flexibility     Hamstrings (90/90)     ITB (Fuad)     Quads (Ely's)     Hip Flexor Martha Lav)          Girth     Mid patella     Suprapatellar         Joint mobility: hip    [x]Normal    []Hypo   []Hyper         Functional Testing  Sx Date  Prehab Date  Post-op Re-Eval Date  4 week F/U date  8 week F/U date  D/C Date       TUG (sec) 13       30 second sit to stand (reps) 9       6 minute walk (m) 287          Balance:    Narrowed DARIN (sec) 10       Semi Tandem (sec)   10             Tandem (sec)   10               SLS (sec) 10            Knee AROM L R L R L R L R L R   Flexion             Extension                Knee Ext: L R L R L R L R L R   MMT (of 5)             Knee Ext (#)                Hip Abd:  L R L R L R L R L R   MMT (of 5)             Hip Abd (#)                LEFS (raw)                               [x] Patient history, allergies, meds reviewed. Medical chart reviewed. See intake form. Review Of Systems (ROS):  [x]Performed Review of systems (Integumentary, CardioPulmonary, Neurological) by intake and observation. Intake form has been scanned into medical record. Patient has been instructed to contact their primary care physician regarding ROS issues if not already being addressed at this time. Co-morbidities/Complexities (which will affect course of rehabilitation):   []None           Arthritic conditions   []Rheumatoid arthritis (M05.9)  [x]Osteoarthritis (M19.91)   Cardiovascular conditions   []Hypertension (I10)  [x]Hyperlipidemia (E78.5)  []Angina pectoris (I20)  []Atherosclerosis (I70)   Musculoskeletal conditions   []Disc pathology   []Congenital spine pathologies   []Prior surgical intervention  []Osteoporosis (M81.8)  []Osteopenia (M85.8)   Endocrine conditions   []Hypothyroid (E03.9)  []Hyperthyroid Gastrointestinal conditions   []Constipation (S47.35)   Metabolic conditions   []Morbid obesity (E66.01)  []Diabetes type 1(E10.65) or 2 (E11.65)   []Neuropathy (G60.9)     Pulmonary conditions   [x]Asthma (J45)  []Coughing   []COPD (J44.9)   Psychological Disorders  []Anxiety (F41.9)  []Depression (F32.9)   []Other:   []Other:          Barriers to/and or personal factors that will affect rehab potential:              []Age  []Sex    []Smoker              []Motivation/Lack of Motivation                        []Co-Morbidities              []Cognitive Function, education/learning barriers              [x]Environmental, home barriers              []profession/work barriers  []past PT/medical experience  []other:  Justification:     Falls Risk Assessment (30 days):   [x] Falls Risk assessed and no intervention required.   [] Falls Risk assessed and Patient requires intervention due to being higher risk   TUG score (>12s at risk):     [] Falls education provided, including         ASSESSMENT:   Functional Impairments:     []Noted lumbar/proximal hip/LE joint hypomobility   []Decreased LE functional ROM   []Decreased core/proximal hip strength and neuromuscular control   [x]Decreased LE functional strength   []Reduced balance/proprioceptive control   []other:      Functional Activity Limitations (from functional questionnaire and intake)   [x]Reduced ability to tolerate prolonged functional positions   [x]Reduced ability or difficulty with changes of positions or transfers between positions   [x]Reduced ability to maintain good posture and demonstrate good body mechanics with sitting, bending, and lifting   [x]Reduced ability to sleep   [] Reduced ability or tolerance with driving and/or computer work   [x]Reduced ability to perform lifting, carrying tasks   [x]Reduced ability to squat   [x]Reduced ability to forward bend   [x]Reduced ability to ambulate prolonged functional periods/distances/surfaces   [x]Reduced ability to ascend/descend stairs   []Reduced ability to run, hop, cut or jump   []other:    Participation Restrictions   [x]Reduced participation in self care activities   [x]Reduced participation in home management activities   [x]Reduced participation in work activities   []Reduced participation in social activities. []Reduced participation in sport/recreation activities. Classification :    []Signs/symptoms consistent with post-surgical status including decreased ROM, strength and function.    []Signs/symptoms consistent with joint sprain/strain   []Signs/symptoms consistent with patella-femoral syndrome   [x]Signs/symptoms consistent with knee OA/hip OA   []Signs/symptoms consistent with internal derangement of knee/Hip   []Signs/symptoms consistent with functional hip weakness/NMR control      []Signs/symptoms consistent with tendinitis/tendinosis    []signs/symptoms consistent with pathology which may activity modification, progression of HEP. HEP instruction: written HEP instructions provided and discussed    Patient education:  Patient was thoroughly educated on this date regarding prehabilitation goals, importance of PT sessions in improving overall ROM, strength and stability prior to surgery, and how prehabilitation will facilitate improved post-operative outcomes. The patient was educated on and instructed in HEP as listed. The patient was given a detailed handout for exercises to initiate in the hospital post-operatively as well as at home. The discharge plan from the hospital was reviewed with the patient; specifically, to reduce length of hospital stay and to minimize time before reinitiating outpatient physical therapy after surgery. Education regarding early mobility post-operatively in the hospital and emphasis on working with both physical therapy and nursing staff to achieve ambulation goal of 150 feet was provided. The patient was highly encouraged to attend joint class in hospital prior to surgery for further instructions on pre and post-surgical care. Also, the patient was educated on precautions after hip replacement to avoid, specifically, hip extension and repetitive hip flexion. It is in my medical opinion that this patient is clear from all physical barriers prior to consideration for surgery, activity modifications prior to and post operatively have been discussed with this patient as well as discharge planning and is cleared for surgery from physical therapy perspective. GOALS:  Patient stated goal: better movement  [x] Progressing: [] Met: [] Not Met: [] Adjusted    Therapist goals for Patient:   Short Term Goals: To be achieved in: 1 visit  1. Independent in HEP and progression per patient tolerance, in order to prevent re-injury. [x] Progressing: [] Met: [] Not Met: [] Adjusted  2. All patient questions regarding expectations for rehab following upcoming surgery are answered. [] Progressing: [x] Met: [] Not Met: [] Adjusted    Long Term Goals: long term goals to be determined at re-eval following upcoming surgery    Electronically signed by:  Nathan Hahn PT

## 2021-04-06 ENCOUNTER — TELEPHONE (OUTPATIENT)
Dept: ORTHOPEDIC SURGERY | Age: 56
End: 2021-04-06

## 2021-04-06 ENCOUNTER — TELEPHONE (OUTPATIENT)
Dept: CARDIOLOGY CLINIC | Age: 56
End: 2021-04-06

## 2021-04-07 ENCOUNTER — PREP FOR PROCEDURE (OUTPATIENT)
Dept: ORTHOPEDICS UNIT | Age: 56
End: 2021-04-07

## 2021-04-07 DIAGNOSIS — E78.00 HYPERCHOLESTEROLEMIA: ICD-10-CM

## 2021-04-07 RX ORDER — ATORVASTATIN CALCIUM 10 MG/1
TABLET, FILM COATED ORAL
Qty: 90 TABLET | Refills: 0 | Status: SHIPPED | OUTPATIENT
Start: 2021-04-07 | End: 2021-06-29

## 2021-04-07 NOTE — TELEPHONE ENCOUNTER
Spoke with Scar @ [facility]. Restarted case due to clinicals not received. PA requsted via [employer] by Phone @ [phone] w/clinicals.   Reference # [id number]

## 2021-04-08 RX ORDER — CELECOXIB 200 MG/1
200 CAPSULE ORAL ONCE
Status: CANCELLED | OUTPATIENT
Start: 2021-04-08 | End: 2021-04-08

## 2021-04-08 RX ORDER — OXYCODONE HYDROCHLORIDE 10 MG/1
10 TABLET ORAL ONCE
Status: CANCELLED | OUTPATIENT
Start: 2021-04-08 | End: 2021-04-08

## 2021-04-13 ENCOUNTER — OFFICE VISIT (OUTPATIENT)
Dept: FAMILY MEDICINE CLINIC | Age: 56
End: 2021-04-13
Payer: COMMERCIAL

## 2021-04-13 VITALS
SYSTOLIC BLOOD PRESSURE: 102 MMHG | WEIGHT: 162.4 LBS | HEIGHT: 68 IN | HEART RATE: 67 BPM | DIASTOLIC BLOOD PRESSURE: 72 MMHG | OXYGEN SATURATION: 97 % | BODY MASS INDEX: 24.61 KG/M2 | RESPIRATION RATE: 12 BRPM | TEMPERATURE: 97 F

## 2021-04-13 DIAGNOSIS — F32.1 CURRENT MODERATE EPISODE OF MAJOR DEPRESSIVE DISORDER, UNSPECIFIED WHETHER RECURRENT (HCC): ICD-10-CM

## 2021-04-13 DIAGNOSIS — Z13.220 LIPID SCREENING: ICD-10-CM

## 2021-04-13 DIAGNOSIS — Z01.419 WELL WOMAN EXAM WITH ROUTINE GYNECOLOGICAL EXAM: Primary | ICD-10-CM

## 2021-04-13 DIAGNOSIS — R20.2 PARESTHESIA OF RIGHT LEG: ICD-10-CM

## 2021-04-13 LAB
CHOLESTEROL, TOTAL: 199 MG/DL (ref 0–199)
HDLC SERPL-MCNC: 33 MG/DL (ref 40–60)
LDL CHOLESTEROL CALCULATED: 133 MG/DL
TRIGL SERPL-MCNC: 164 MG/DL (ref 0–150)
VLDLC SERPL CALC-MCNC: 33 MG/DL

## 2021-04-13 PROCEDURE — 36415 COLL VENOUS BLD VENIPUNCTURE: CPT | Performed by: NURSE PRACTITIONER

## 2021-04-13 PROCEDURE — 99396 PREV VISIT EST AGE 40-64: CPT | Performed by: NURSE PRACTITIONER

## 2021-04-13 RX ORDER — ESCITALOPRAM OXALATE 10 MG/1
10 TABLET ORAL DAILY
Qty: 60 TABLET | Refills: 0 | Status: SHIPPED | OUTPATIENT
Start: 2021-04-13 | End: 2021-06-07

## 2021-04-13 RX ORDER — GABAPENTIN 300 MG/1
300 CAPSULE ORAL NIGHTLY PRN
Qty: 90 CAPSULE | Refills: 0 | Status: ON HOLD | OUTPATIENT
Start: 2021-04-13 | End: 2021-05-04

## 2021-04-13 RX ORDER — GABAPENTIN 300 MG/1
300 CAPSULE ORAL 3 TIMES DAILY
Qty: 90 CAPSULE | Refills: 0 | Status: SHIPPED | OUTPATIENT
Start: 2021-04-13 | End: 2021-04-13 | Stop reason: SDUPTHER

## 2021-04-13 ASSESSMENT — COLUMBIA-SUICIDE SEVERITY RATING SCALE - C-SSRS: 2. HAVE YOU ACTUALLY HAD ANY THOUGHTS OF KILLING YOURSELF?: NO

## 2021-04-13 ASSESSMENT — PATIENT HEALTH QUESTIONNAIRE - PHQ9
4. FEELING TIRED OR HAVING LITTLE ENERGY: 0
2. FEELING DOWN, DEPRESSED OR HOPELESS: 2
SUM OF ALL RESPONSES TO PHQ QUESTIONS 1-9: 10
6. FEELING BAD ABOUT YOURSELF - OR THAT YOU ARE A FAILURE OR HAVE LET YOURSELF OR YOUR FAMILY DOWN: 2
7. TROUBLE CONCENTRATING ON THINGS, SUCH AS READING THE NEWSPAPER OR WATCHING TELEVISION: 1
SUM OF ALL RESPONSES TO PHQ QUESTIONS 1-9: 10
10. IF YOU CHECKED OFF ANY PROBLEMS, HOW DIFFICULT HAVE THESE PROBLEMS MADE IT FOR YOU TO DO YOUR WORK, TAKE CARE OF THINGS AT HOME, OR GET ALONG WITH OTHER PEOPLE: 0
9. THOUGHTS THAT YOU WOULD BE BETTER OFF DEAD, OR OF HURTING YOURSELF: 0

## 2021-04-13 NOTE — PATIENT INSTRUCTIONS
Patient Education        Depression and Chronic Disease: Care Instructions  Your Care Instructions     A chronic disease is one that you have for a long time. Some chronic diseases can be controlled, but they usually cannot be cured. Depression is common in people with chronic diseases, but it often goes unnoticed. Many people have concerns about seeking treatment for a mental health problem. You may think it's a sign of weakness, or you don't want people to know about it. It's important to overcome these reasons for not seeking treatment. Treating depression or anxiety is good for your health. Follow-up care is a key part of your treatment and safety. Be sure to make and go to all appointments, and call your doctor if you are having problems. It's also a good idea to know your test results and keep a list of the medicines you take. How can you care for yourself at home? Watch for symptoms of depression  The symptoms of depression are often subtle at first. You may think they are caused by your disease rather than depression. Or you may think it is normal to be depressed when you have a chronic disease. If you are depressed you may:  · Feel sad or hopeless. · Feel guilty or worthless. · Not enjoy the things you used to enjoy. · Feel hopeless, as though life is not worth living. · Have trouble thinking or remembering. · Have low energy, and you may not eat or sleep well. · Pull away from others. · Think often about death or killing yourself. (Keep the numbers for these national suicide hotlines: 9-248-681-TALK [1-298.556.1582] and 7-359-JRNPRRH [1-816.239.5837]. )  Get treatment  By treating your depression, you can feel more hopeful and have more energy. If you feel better, you may take better care of yourself, so your health may improve. · Talk to your doctor if you have any changes in mood during treatment for your disease. · Ask your doctor for help.  Counseling, antidepressant medicine, or a combination of the two can help most people with depression. Often a combination works best. Counseling can also help you cope with having a chronic disease. When should you call for help? Call 911 anytime you think you may need emergency care. For example, call if:    · You feel like hurting yourself or someone else.     · Someone you know has depression and is about to attempt or is attempting suicide. Call your doctor now or seek immediate medical care if:    · You hear voices.     · Someone you know has depression and:  ? Starts to give away his or her possessions. ? Uses illegal drugs or drinks alcohol heavily. ? Talks or writes about death, including writing suicide notes or talking about guns, knives, or pills. ? Starts to spend a lot of time alone. ? Acts very aggressively or suddenly appears calm. Watch closely for changes in your health, and be sure to contact your doctor if:    · You do not get better as expected. Where can you learn more? Go to https://ZÃ¼m XR.Wholeshare. org and sign in to your Serus account. Enter Z491 in the Kickfire box to learn more about \"Depression and Chronic Disease: Care Instructions. \"     If you do not have an account, please click on the \"Sign Up Now\" link. Current as of: September 23, 2020               Content Version: 12.8  © 2006-2021 [employer]. Care instructions adapted under license by [facility] ([facility]). If you have questions about a medical condition or this instruction, always ask your healthcare professional. [place] [address] any warranty or liability for your use of this information. Patient Education        Numbness and Tingling: Care Instructions  Your Care Instructions     Many things can cause numbness or tingling. Swelling may put pressure on a nerve. This could cause you to lose feeling or have a pins-and-needles sensation on part of your body.  Nerves may be damaged from trauma, toxins, or diseases, such as diabetes or multiple sclerosis (MS). Sometimes, though, the cause is not clear. If there is no clear reason for your symptoms, and you are not having any other symptoms, your doctor may suggest watching and waiting for a while to see if the numbness or tingling goes away on its own. Your doctor may want you to have blood or nerve tests to find the cause of your symptoms. Follow-up care is a key part of your treatment and safety. Be sure to make and go to all appointments, and call your doctor if you are having problems. It's also a good idea to know your test results and keep a list of the medicines you take. How can you care for yourself at home? · If your doctor prescribes medicine, take it exactly as directed. Call your doctor if you think you are having a problem with your medicine. · If you have any swelling, put ice or a cold pack on the area for 10 to 20 minutes at a time. Put a thin cloth between the ice and your skin. When should you call for help? Call 911 anytime you think you may need emergency care. For example, call if:    · You have weakness, numbness, or tingling in both legs.     · You lose bowel or bladder control.     · You have symptoms of a stroke. These may include:  ? Sudden numbness, tingling, weakness, or loss of movement in your face, arm, or leg, especially on only one side of your body. ? Sudden vision changes. ? Sudden trouble speaking. ? Sudden confusion or trouble understanding simple statements. ? Sudden problems with walking or balance. ? A sudden, severe headache that is different from past headaches. Watch closely for changes in your health, and be sure to contact your doctor if you have any problems, or if:    · You do not get better as expected. Where can you learn more? Go to https://chdipakeb.Librato. org and sign in to your BioClinica account.  Enter L324 in the Mayomi box to learn more about \"Numbness and stop-smoking programs and medicines. These can increase your chances of quitting for good. · Care for your mental health. It is easy to get weighed down by worry and stress. Learn strategies to manage stress, like deep breathing and mindfulness, and stay connected with your family and community. If you find you often feel sad or hopeless, talk with your doctor. Treatment can help. · Talk to your doctor about whether you have any risk factors for sexually transmitted infections (STIs). You can help prevent STIs if you wait to have sex with a new partner (or partners) until you've each been tested for STIs. It also helps if you use condoms (male or female condoms) and if you limit your sex partners to one person who only has sex with you. Vaccines are available for some STIs. · If you think you may have a problem with alcohol or drug use, talk to your doctor. This includes prescription medicines (such as amphetamines and opioids) and illegal drugs (such as cocaine and methamphetamine). Your doctor can help you figure out what type of treatment is best for you. · Protect your skin from too much sun. When you're outdoors from 10 a.m. to 4 p.m., stay in the shade or cover up with clothing and a hat with a wide brim. Wear sunglasses that block UV rays. Even when it's cloudy, put broad-spectrum sunscreen (SPF 30 or higher) on any exposed skin. · See a dentist one or two times a year for checkups and to have your teeth cleaned. · Wear a seat belt in the car. When should you call for help? Watch closely for changes in your health, and be sure to contact your doctor if you have any problems or symptoms that concern you. Where can you learn more? Go to https://enrique.health-partners. org and sign in to your Inkerwang account. Enter L828 in the [facility] box to learn more about \"Well Visit, Women 50 to 72: Care Instructions. \"     If you do not have an account, please click on the \"Sign Up Now\" link.  Current as of: May 27, 2020               Content Version: 12.8  © 3894-0030 [employer]. Care instructions adapted under license by [facility] ([facility]). If you have questions about a medical condition or this instruction, always ask your healthcare professional. [address] any warranty or liability for your use of this information.
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
## 2021-04-14 LAB
C TRACH DNA GENITAL QL NAA+PROBE: NEGATIVE
CANDIDA SPECIES, DNA PROBE: NORMAL
GARDNERELLA VAGINALIS, DNA PROBE: NORMAL
HPV COMMENT: NORMAL
HPV TYPE 16: NOT DETECTED
HPV TYPE 18: NOT DETECTED
HPVOH (OTHER TYPES): NOT DETECTED
N. GONORRHOEAE DNA: NEGATIVE
TRICHOMONAS VAGINALIS DNA: NORMAL

## 2021-04-15 ENCOUNTER — HOSPITAL ENCOUNTER (OUTPATIENT)
Dept: PHYSICAL THERAPY | Age: 56
Setting detail: THERAPIES SERIES
Discharge: HOME | End: 2021-04-15
Payer: COMMERCIAL

## 2021-04-15 PROCEDURE — 97140 MANUAL THERAPY 1/> REGIONS: CPT

## 2021-04-15 PROCEDURE — 97110 THERAPEUTIC EXERCISES: CPT

## 2021-04-15 NOTE — FLOWSHEET NOTE
[address] [facility] [address]  Phone: [phone]   Fax:     [phone]    Physical Therapy Treatment Note/ Progress Report:     Date:  4/15/2021     Patient Name:  Jadiel Alamo    :  1965  MRN: [id number]    Pertinent Medical History:     Medical/Treatment Diagnosis Information:  Diagnosis: Avascular necrosis of left femur ([address] (M87.052  Treatment Diagnosis: Left hip weakness limiting tolerance to walking, standing and adl's    Insurance/Certification information:  PT Insurance Information: Kenneth [employer] [employer]  Physician Information:  Referring Practitioner: Dr Abisai Robertson of care signed (Y/N): routed 21    Date of Patient follow up with Physician:      Progress Report: []  Yes  [x]  No     Date Range for reporting period:  Beginnin/15/2021  Ending:      Progress report due (10 Rx/or 30 days whichever is less): 87     Recertification due (POC duration/ or 90 days whichever is less):     Visit # POC/Insurance Allowable Auth Needed   2 BOMN []Yes   []No     Latex Allergy:  [x]NO      []YES  Preferred Language for Healthcare:   [x]English       []Other:    Functional Scale:      Date assessed: at eval  Test:  Score:    Pain level:  6/10     History of Injury:     SUBJECTIVE:    4/15/21 Pt reports hip soreness this morning is about the same. OBJECTIVE: see eval   Test measurements:      RESTRICTIONS/PRECAUTIONS:     Exercises/Interventions:     Therapeutic Ex (57264)   Min: Reps/Resistance Notes/CUES   Nu step  L4 x 5 min     Leg Press Bilat.  75# 20 x 90# 20 x 2                        Heel slides  15 x    Gluteal sets  5\" 15 x    Bridges  5-10\" x 10     Clams  L 20 x                                                 Manual Intervention (92996)  Min:     DTM Bilat LS and gluteals     Mobs Hip distraction R grade 5 and Left grade 3              NMR re-education (44357)  Min:                                             Therapeutic Activity (43909)  Min:          Gait Training (95936)  Min:       The patient was educated on and practiced gait with a rolling walker on a level surface. They demonstrated proper technique, good safety awareness and good posture following the instruction. Balance was observed to be good. All patient questions regarding gait were answered. The patient was educated on stairs using railings and one step at a time for both ascending and descending stairs. With the LE as the operative leg they were instruction to lead up the strong LE and lead down steps with the weaker (operative LE). \"Up with the good and down with the bad\" was used as a quip to help the patient remember the sequence. They demonstrated proper technique and verbalized understanding to stair climbing instruction. All patient questions regarding stairs were answered. Modalities  Min:            Other Therapeutic Activities: Patient was thoroughly educated on this date regarding prehabilitation goals, importance of PT sessions in improving overall ROM, strength and stability prior to surgery, and how prehabilitation will facilitate improved post-operative outcomes. The patient was educated on and instructed in HEP as listed. The patient was given a detailed handout for exercises to initiate in the hospital post-operatively as well as at home. The discharge plan from the hospital was reviewed with the patient; specifically, to reduce length of hospital stay and to minimize time before reinitiating outpatient physical therapy after surgery. Education regarding early mobility post-operatively in the hospital and emphasis on working with both physical therapy and nursing staff to achieve ambulation goal was provided. The patient was highly encouraged to attend joint class in hospital prior to surgery for further instructions on pre and post-surgical care.  Also, the training and instruction to the patient for proper LE, core and proximal hip recruitment and positioning and eccentric body weight control with ambulation re-education including up and down stairs     Gait Training:  [] (16972) Provided training and instruction to the patient for proper postural muscle recruitment and positioning with ambulation re-education     Home Exercise Program:    [x] (19458) Reviewed/Progressed HEP activities related to strengthening, flexibility, endurance, ROM of core, proximal hip and LE for functional self-care, mobility, lifting and ambulation/stair navigation   [] (12384)Reviewed/Progressed HEP activities related to improving balance, coordination, kinesthetic sense, posture, motor skill, proprioception of core, proximal hip and LE for self care, mobility, lifting, and ambulation/stair navigation      Manual Treatments:  PROM / STM / Oscillations-Mobs:  G-I, II, III, IV (PA's, Inf., Post.)  [] (34169) Provided manual therapy to mobilize LE, proximal hip and/or LS spine soft tissue/joints for the purpose of modulating pain, promoting relaxation,  increasing ROM, reducing/eliminating soft tissue swelling/inflammation/restriction, improving soft tissue extensibility and allowing for proper ROM for normal function with self care, mobility, lifting and ambulation.      Charges:  Timed Code Treatment Minutes: 45   Total Treatment Minutes: 45      [] EVAL (LOW) 36415 (typically 20 minutes face-to-face)  [] EVAL (MOD) 43650 (typically 30 minutes face-to-face)  [] EVAL (HIGH) 88561 (typically 45 minutes face-to-face)  [] RE-EVAL     [x] XH(79755) x   2  [] Dry needle 1 or 2 Muscles (97293)  [] NMR (60600) x     [] Dry needle 3+ Muscles (77046)  [x] Manual (56150) x     [] Ultrasound (32841) x  [] TA (26133) x     [] Mech Traction (79848)  [] ES(attended) (22265)     [] ES (un) (00600):   [] Vasopump (12896) [] Ionto (96434)   [] Gait (70585)  [] Other:    GOALS:  Patient stated goal: better

## 2021-04-16 ENCOUNTER — HOSPITAL ENCOUNTER (OUTPATIENT)
Dept: GENERAL RADIOLOGY | Age: 56
Discharge: HOME | End: 2021-04-16
Payer: COMMERCIAL

## 2021-04-16 ENCOUNTER — TELEPHONE (OUTPATIENT)
Dept: FAMILY MEDICINE CLINIC | Age: 56
End: 2021-04-16

## 2021-04-16 DIAGNOSIS — Z78.0 ASYMPTOMATIC MENOPAUSE: ICD-10-CM

## 2021-04-16 PROCEDURE — 77080 DXA BONE DENSITY AXIAL: CPT

## 2021-04-16 NOTE — TELEPHONE ENCOUNTER
SHE ALSO SENT A MYCHART - IS IT OK THAT SHE DOES THE COLOGUARD?  WILL YOU ORDER IN [facility] PLEASE

## 2021-04-16 NOTE — TELEPHONE ENCOUNTER
Since she has multiple grandparents who had colon cancer, I would recommend getting the colonoscopy, but if it is Cologuard or nothing I will order it. Let me know what she thinks.

## 2021-04-16 NOTE — TELEPHONE ENCOUNTER
PT @  [phone]    PT CALLING TO SEE IF SHE CAN DO THE COLOGUARD TEST INSTEAD OF THE COLONOSCOPY - ANYONE CAN ANSWER

## 2021-04-21 ENCOUNTER — PATIENT MESSAGE (OUTPATIENT)
Dept: FAMILY MEDICINE CLINIC | Age: 56
End: 2021-04-21

## 2021-04-21 NOTE — TELEPHONE ENCOUNTER
From: Lizzie Villanueva  To: Gwynneth Canavan, APRN - CNP  Sent: 4/21/2021 5:35 PM EDT  Subject: Visit Follow-Up Question    Kenia [place],    My op appointment is rescheduled. Can you please tell me if you have something sooner?     Thanks!!

## 2021-04-22 ENCOUNTER — TELEPHONE (OUTPATIENT)
Dept: ORTHOPEDIC SURGERY | Age: 56
End: 2021-04-22

## 2021-04-22 NOTE — TELEPHONE ENCOUNTER
General Question     Subject: QUESTION/MEDICATION REGARDING DEXA SCAN  Patient and /or Facility Request: Zen Boyle \"Laura Brandt\"  Contact Number: [phone]    QUESTIONS REGARDING IF SHE CAN TAKE  MEDICATION PRIOR TO SURGERY, AND THE RESULTS OF HER DEXA SCAN    PLEASE CALL BACK PATIENT AT THE ABOVE NUMBER

## 2021-04-23 ENCOUNTER — TELEPHONE (OUTPATIENT)
Dept: ORTHOPEDIC SURGERY | Age: 56
End: 2021-04-23

## 2021-04-23 NOTE — TELEPHONE ENCOUNTER
General Question     [address]  Patient and /or Facility Request: PATIENT  Contact Number: [phone]

## 2021-04-23 NOTE — TELEPHONE ENCOUNTER
General Question     Subject: returned call  Patient and /or Facility Request: Philip Vásquez  Contact Number: [phone]  ROGELIO RETURNED CALL

## 2021-04-26 ENCOUNTER — HOSPITAL ENCOUNTER (OUTPATIENT)
Dept: PREADMISSION TESTING | Age: 56
Discharge: HOME | End: 2021-04-30
Payer: COMMERCIAL

## 2021-04-26 DIAGNOSIS — M87.059 AVASCULAR NECROSIS OF FEMUR, UNSPECIFIED LATERALITY (HCC): ICD-10-CM

## 2021-04-26 LAB
ABO/RH: NORMAL
ALBUMIN SERPL-MCNC: 5.2 G/DL (ref 3.4–5)
ANION GAP SERPL CALCULATED.3IONS-SCNC: 16 MMOL/L (ref 3–16)
ANTIBODY SCREEN: NORMAL
APTT: 34.1 SEC (ref 24.2–36.2)
BACTERIA: ABNORMAL /HPF
BASOPHILS ABSOLUTE: 0 K/UL (ref 0–0.2)
BASOPHILS RELATIVE PERCENT: 0.5 %
BILIRUBIN URINE: NEGATIVE
BLOOD, URINE: ABNORMAL
BUN BLDV-MCNC: 10 MG/DL (ref 7–20)
CALCIUM SERPL-MCNC: 10 MG/DL (ref 8.3–10.6)
CHLORIDE BLD-SCNC: 102 MMOL/L (ref 99–110)
CLARITY: ABNORMAL
CO2: 24 MMOL/L (ref 21–32)
COLOR: YELLOW
CREAT SERPL-MCNC: 0.7 MG/DL (ref 0.6–1.1)
EKG ATRIAL RATE: 56 BPM
EKG DIAGNOSIS: NORMAL
EKG P AXIS: 76 DEGREES
EKG P-R INTERVAL: 156 MS
EKG Q-T INTERVAL: 426 MS
EKG QRS DURATION: 76 MS
EKG QTC CALCULATION (BAZETT): 411 MS
EKG R AXIS: 38 DEGREES
EKG T AXIS: 65 DEGREES
EKG VENTRICULAR RATE: 56 BPM
EOSINOPHILS ABSOLUTE: 0 K/UL (ref 0–0.6)
EOSINOPHILS RELATIVE PERCENT: 0.8 %
EPITHELIAL CELLS, UA: 6 /HPF (ref 0–5)
ESTIMATED AVERAGE GLUCOSE: 131.2 MG/DL
GFR AFRICAN AMERICAN: >60
GFR NON-AFRICAN AMERICAN: >60
GLUCOSE BLD-MCNC: 85 MG/DL (ref 70–99)
GLUCOSE URINE: NEGATIVE MG/DL
HBA1C MFR BLD: 6.2 %
HCT VFR BLD CALC: 34 % (ref 36–48)
HEMOGLOBIN: 11.1 G/DL (ref 12–16)
HYALINE CASTS: 4 /LPF (ref 0–8)
INR BLD: 1.03 (ref 0.86–1.14)
KETONES, URINE: NEGATIVE MG/DL
LEUKOCYTE ESTERASE, URINE: ABNORMAL
LYMPHOCYTES ABSOLUTE: 2.2 K/UL (ref 1–5.1)
LYMPHOCYTES RELATIVE PERCENT: 39.9 %
MCH RBC QN AUTO: 23.3 PG (ref 26–34)
MCHC RBC AUTO-ENTMCNC: 32.5 G/DL (ref 31–36)
MCV RBC AUTO: 71.7 FL (ref 80–100)
MICROSCOPIC EXAMINATION: YES
MONOCYTES ABSOLUTE: 0.4 K/UL (ref 0–1.3)
MONOCYTES RELATIVE PERCENT: 7.3 %
NEUTROPHILS ABSOLUTE: 2.8 K/UL (ref 1.7–7.7)
NEUTROPHILS RELATIVE PERCENT: 51.5 %
NITRITE, URINE: NEGATIVE
PDW BLD-RTO: 17.4 % (ref 12.4–15.4)
PH UA: 6 (ref 5–8)
PLATELET # BLD: 413 K/UL (ref 135–450)
PMV BLD AUTO: 7.1 FL (ref 5–10.5)
POTASSIUM SERPL-SCNC: 4.2 MMOL/L (ref 3.5–5.1)
PREALBUMIN: 34.2 MG/DL (ref 20–40)
PROTEIN UA: NEGATIVE MG/DL
PROTHROMBIN TIME: 11.9 SEC (ref 10–13.2)
RBC # BLD: 4.75 M/UL (ref 4–5.2)
RBC UA: 3 /HPF (ref 0–4)
SEDIMENTATION RATE, ERYTHROCYTE: 38 MM/HR (ref 0–30)
SODIUM BLD-SCNC: 142 MMOL/L (ref 136–145)
SPECIFIC GRAVITY UA: 1.01 (ref 1–1.03)
URINE REFLEX TO CULTURE: ABNORMAL
URINE TYPE: ABNORMAL
UROBILINOGEN, URINE: 0.2 E.U./DL
VITAMIN D 25-HYDROXY: 28.5 NG/ML
WBC # BLD: 5.5 K/UL (ref 4–11)
WBC UA: 5 /HPF (ref 0–5)

## 2021-04-26 PROCEDURE — 83036 HEMOGLOBIN GLYCOSYLATED A1C: CPT

## 2021-04-26 PROCEDURE — 85652 RBC SED RATE AUTOMATED: CPT

## 2021-04-26 PROCEDURE — 86850 RBC ANTIBODY SCREEN: CPT

## 2021-04-26 PROCEDURE — 93005 ELECTROCARDIOGRAM TRACING: CPT

## 2021-04-26 PROCEDURE — 85730 THROMBOPLASTIN TIME PARTIAL: CPT

## 2021-04-26 PROCEDURE — 81001 URINALYSIS AUTO W/SCOPE: CPT

## 2021-04-26 PROCEDURE — 85025 COMPLETE CBC W/AUTO DIFF WBC: CPT

## 2021-04-26 PROCEDURE — 86901 BLOOD TYPING SEROLOGIC RH(D): CPT

## 2021-04-26 PROCEDURE — 85610 PROTHROMBIN TIME: CPT

## 2021-04-26 PROCEDURE — 87641 MR-STAPH DNA AMP PROBE: CPT

## 2021-04-26 PROCEDURE — 82040 ASSAY OF SERUM ALBUMIN: CPT

## 2021-04-26 PROCEDURE — 86900 BLOOD TYPING SEROLOGIC ABO: CPT

## 2021-04-26 PROCEDURE — 84134 ASSAY OF PREALBUMIN: CPT

## 2021-04-26 PROCEDURE — 93010 ELECTROCARDIOGRAM REPORT: CPT | Performed by: INTERNAL MEDICINE

## 2021-04-26 PROCEDURE — 80048 BASIC METABOLIC PNL TOTAL CA: CPT

## 2021-04-26 PROCEDURE — 82306 VITAMIN D 25 HYDROXY: CPT

## 2021-04-27 ENCOUNTER — TELEPHONE (OUTPATIENT)
Dept: ORTHOPEDIC SURGERY | Age: 56
End: 2021-04-27

## 2021-04-27 LAB — MRSA SCREEN RT-PCR: NORMAL

## 2021-04-27 NOTE — CARE COORDINATION
DATE OF JET CLASS INTERVIEW:  4/27/21    ACCOMPANIED TODAY BY:   Phone Interview    FIRST JOINT REPLACEMENT? Yes  TYPE AND DATE OF LAST JOINT REPLACEMENT? TRANSPORTATION:  Fabian Koch  Ph: [phone]    STEPS INTO HOME?  10 steps (5 with rail)    STEPS TO BATHROOM/BEDROOM?  10 steps to living area. DME:   Needs walker. Needs shower chair. Needs raised toilet seat. CHOICES FOR HHC, DME VENDORS AND SKILLED/ REHAB FACILITIES PROVIDED TO PT DURING THIS INTERVIEW. DISCHARGE PLAN:/ INCLUDING WHO WILL BE STAYING WITH YOU AT HOME? Fabian Koch will be home. LENGTH OF STAY HAS BEEN DISCUSSED WITH THE PT, APPROPRIATE TO HIS/ HER PROCEDURE. PT HAS BEEN INFORMED THAT THEY WILL BE DISCHARGED WHEN THE PHYSICIAN DEEMS THEM MEDICALLY READY. MOST PATIENTS CAN EXPECT  TO BE IN THE HOSPITAL ONE NIGHT AS AN OBSERVATION ONLY, OR 1-2 DAYS AS AN ADMISSION FOR THOSE WITH MEDICAL HEALTH  ISSUES/COMPLICATIONS. HOME CARE: [facility]     SNF/REHAB: n/a        PT AGREEABLE TO MEDS TO BEDS FROM [facility]. Agreeable to meds to bed program.    Contact information for case management provided to pt. Will follow with therapies and social service.

## 2021-04-27 NOTE — TELEPHONE ENCOUNTER
Vitamin D level is low at 28.5. Instructed patient to take over-the-counter Vitamin D [id number] IUs daily.     I called the patient and informed her

## 2021-04-28 RX ORDER — ALENDRONATE SODIUM 70 MG/1
70 TABLET ORAL
Status: ON HOLD | COMMUNITY
Start: 2021-04-22 | End: 2021-05-04

## 2021-04-28 NOTE — PROGRESS NOTES
of any open or redden areas that may                        look infected                     3.  DO NOT shave your operative site 96 hours(four days) prior to                                  surgery. 4.  Shower the week before surgery with an antibacterial soap, such as                       dial, safeguard, etc.                         5.  Three(3) days prior to your surgery, cleanse the operative site with                          Hibiclens(anti-microbial soap). This soap may dry your skin, please                          do not apply any oils or lotions     · Please bring your insurance card and picture ID day of surgery    · If you have a living will or durable power of attorny. Please bring in a copy of your advanced directives. · If you have dentures, they will be removed before going to the OR, we will provide you with a container. If you wear contact lenses or glasses, they will be removes, please bring a case for them. · Have you seen your family doctor for a pre-op history and physical.      · Surgery scheduler will call you 48 hours prior to your surgery to notify you of the time of your surgery and the time you will need to be at hospital...patients are asked to arrive 21/2 hours prior to surgery. ·  Please call Pre-Admission testing if you have any further questions. [id number] [facility] testing phone number:  [phone]      Thank You for choosing [facility]!!  Preoperative Screening for Elective Surgery/Invasive Procedures While COVID-19 present in the community     Have you tested positive or have been told to self-isolate for COVID-19 like symptoms within the past 28 days? no   Do you currently have any of the following symptoms?no  o Fever >100.0 F or 99.9 F in immunocompromised patients?   o New onset cough, shortness of breath or difficulty breathing?  o New onset sore throat, myalgia (muscle aches and pains), headache, loss of taste/smell or diarrhea?  Have you had a potential exposure to COVID-19 within the past 14 days by:  o Close contact with a confirmed case?no  o Close contact with a healthcare worker,  or essential infrastructure worker (grocery store, [employer], gas station, public utilities or transportation)?no  o Do you reside in a congregate setting such as; skilled nursing facility, adult home, correctional facility, homeless shelter or other institutional setting?no  o Have you had recent travel to a known COVID-19 hotspot? no    Indicate if the patient has a positive screen by answering yes to one or more of the above questions. Patients who test positive or screen positive prior to surgery or on the day of surgery should be evaluated in conjunction with the surgeon/proceduralist/anesthesiologist to determine the urgency of the procedure.

## 2021-04-29 ENCOUNTER — OFFICE VISIT (OUTPATIENT)
Dept: FAMILY MEDICINE CLINIC | Age: 56
End: 2021-04-29
Payer: COMMERCIAL

## 2021-04-29 ENCOUNTER — OFFICE VISIT (OUTPATIENT)
Dept: PRIMARY CARE CLINIC | Age: 56
End: 2021-04-29
Payer: COMMERCIAL

## 2021-04-29 VITALS
WEIGHT: 162.2 LBS | DIASTOLIC BLOOD PRESSURE: 82 MMHG | HEIGHT: 68 IN | RESPIRATION RATE: 12 BRPM | TEMPERATURE: 97.2 F | BODY MASS INDEX: 24.58 KG/M2 | OXYGEN SATURATION: 97 % | HEART RATE: 64 BPM | SYSTOLIC BLOOD PRESSURE: 120 MMHG

## 2021-04-29 DIAGNOSIS — Z20.828 EXPOSURE TO SARS-ASSOCIATED CORONAVIRUS: Primary | ICD-10-CM

## 2021-04-29 DIAGNOSIS — Z01.818 PRE-OP EXAM: ICD-10-CM

## 2021-04-29 DIAGNOSIS — M87.052 AVASCULAR NECROSIS OF BONE OF LEFT HIP (HCC): Primary | ICD-10-CM

## 2021-04-29 LAB — SARS-COV-2: NOT DETECTED

## 2021-04-29 PROCEDURE — 99214 OFFICE O/P EST MOD 30 MIN: CPT | Performed by: NURSE PRACTITIONER

## 2021-04-29 PROCEDURE — 99211 OFF/OP EST MAY X REQ PHY/QHP: CPT | Performed by: NURSE PRACTITIONER

## 2021-04-29 RX ORDER — FERROUS SULFATE 325(65) MG
325 TABLET ORAL
COMMUNITY

## 2021-04-29 RX ORDER — MULTIVIT-MIN/IRON/FOLIC ACID/K 18-600-40
1 CAPSULE ORAL DAILY
COMMUNITY

## 2021-04-29 NOTE — PATIENT INSTRUCTIONS

## 2021-04-29 NOTE — PROGRESS NOTES
[facility] They were educated on isolation and quarantine as appropriate. For any symptoms, they were directed to seek care from their PCP, given contact information to establish with a doctor, directed to an urgent care or the emergency room.

## 2021-04-29 NOTE — PROGRESS NOTES
Forced sexual activity: Not on file   Other Topics Concern    Not on file   Social History Narrative    ** Merged History Encounter **    No exercise program. In PT for left hip x 3 wks. 10/5/20. Review of Systems  A comprehensive review of systems was negative except for what was noted in the HPI. Physical Exam   Constitutional: She is oriented to person, place, and time. She appears well-developed and well-nourished. No distress. HENT:   Head: Normocephalic and atraumatic. Mouth/Throat: Uvula is midline, oropharynx is clear and moist and mucous membranes are normal.   Eyes: Conjunctivae and EOM are normal. Pupils are equal, round, and reactive to light. Neck: Trachea normal and normal range of motion. Neck supple. No JVD present. Carotid bruit is not present. No mass and no thyromegaly present. Cardiovascular: Normal rate, regular rhythm, normal heart sounds and intact distal pulses. Exam reveals no gallop and no friction rub. No murmur heard. Pulmonary/Chest: Effort normal and breath sounds normal. No respiratory distress. She has no wheezes. She has no rales. Abdominal: Soft. Normal aorta and bowel sounds are normal. She exhibits no distension and no mass. There is no hepatosplenomegaly. No tenderness. Musculoskeletal: She exhibits no edema and no tenderness. Neurological: She is alert and oriented to person, place, and time. She has normal strength. No cranial nerve deficit or sensory deficit. Coordination and gait normal.   Skin: Skin is warm and dry. No rash noted. No erythema. Psychiatric: She has a normal mood and affect. Her behavior is normal.     EKG Interpretation:  .    Lab Review not applicable        Assessment:    Kang Garay was seen today for pre-op exam and other. Diagnoses and all orders for this visit:    Avascular necrosis of bone of left hip ([address]  Pre-op exam       49 y.o. patient with planned surgery as above.     Known risk factors for perioperative

## 2021-04-30 ENCOUNTER — TELEPHONE (OUTPATIENT)
Dept: ORTHOPEDIC SURGERY | Age: 56
End: 2021-04-30

## 2021-04-30 NOTE — DISCHARGE INSTR - COC
[facility]) Continuity of Care Form    Patient Name:  Alejandrnia Ham  : 1965    MRN:  [id number]    Admit date:  2021  Discharge date:  2021    Code Status Order: full  Advance Directives: No    Admitting Physician: Fran Oh MD  PCP: Cathy Snow, APRN - CNP    Discharging Nurse: [facility] Unit/Room#: [address] [place] [address]  Discharging Unit Phone Number: [phone]    Emergency Contact:        Past Surgical History:  Past Surgical History:   Procedure Laterality Date    BREAST ENHANCEMENT SURGERY      COLONOSCOPY  2018    COLONOSCOPY, DR Jj Pinto, 3 MM, 4 MM, 5 MM ASCENDING COLON POLYP REMOVED. LIMITED PREP. REPEAT 3-6 MONTHS.     EPIGASTRIC HERNIA REPAIR  10/30/2017    with mesh     OTHER SURGICAL HISTORY Left     lump removed from under left arm pit in her 30's    PITUITARY SURGERY N/A     brain surgery    TUBAL LIGATION Bilateral 8618    UMBILICAL HERNIA REPAIR  2016    and umbilectomy       Immunization History:   Immunization History   Administered Date(s) Administered    COVID-19, Pfizer, PF, 30mcg/0.3mL 2021    Hepatitis B (Recombivax HB) 2014, 2015, 2015    Influenza Vaccine, unspecified formulation 01/10/2014, 2015    Influenza Virus Vaccine 2014, 2014, 2015, 09/10/2018    Influenza Whole 2014    Influenza, Intradermal, Preservative free 2015    Influenza, Intradermal, Quadrivalent, Preservative Free 10/17/2017    Influenza, Quadv, IM, (6 mo and older Fluzone, Flulaval, Fluarix and 3 yrs and older Afluria) 10/21/2016, 09/10/2018    Influenza, Quadv, IM, PF (6 mo and older Fluzone, Flulaval, Fluarix, and 3 yrs and older Afluria) 10/20/2020    Pneumococcal Conjugate 13-valent (Giytmpa51) 2017    Pneumococcal Polysaccharide ([id number]) 2018    Tdap (Boostrix, Adacel) 2014    Zoster Recombinant (Shingrix) 2019, 2019       Active Problems:  Active Problems:    * No active hospital problems. *  Resolved Problems:    * No resolved hospital problems. *      Isolation/Infection:       Nurse Assessment:  Last Vital Signs:LMP 02/07/2010 (Exact Date)   Last documented pain score (0-10 scale):    Last Weight:   Wt Readings from Last 1 Encounters:   04/29/21 162 lb 3.2 oz (73.6 kg)     Mental Status:  oriented and alert     IV Access:  - None    Nursing Mobility/ADLs:  Walking   Assisted  Transfer  Assisted  Bathing  Assisted  Dressing  Assisted  Toileting  Assisted  Feeding  [address] [facility] Delivery   whole    Wound Care Documentation and Therapy:  Keep glued Prineo dressing intact. DO NOT remove. This is waterproof for showering. Doctor will evaluate wound at office visit 2 weeks after surgery to discuss removal.     Incision 00/26/32 Umbilicus (Active)   Number of days: 7201       Incision 10/30/17 Abdomen (Active)   Number of days: 1278        Elimination:  Urinary Catheter: None   Colostomy/Ileostomy: No  Continence:   · Bowel: Yes  · Bladder: Yes  Date of Last BM: 5/3/21    Intake/Output Summary (Last 24 hours)   No intake or output data in the 24 hours ending 04/30/21 1217  Safety Concerns: At Risk for Falls    Impairments/Disabilities:      Vision    Nutrition Therapy:  Current Nutrition Therapy: No diet orders on file  Routes of Feeding: Oral  Liquids: No Restrictions  Daily Fluid Restriction: no  Last Modified Barium Swallow with Video (Video Swallowing Test): not done    Treatments at the Time of Hospital Discharge:   Respiratory Treatments:   Oxygen Therapy:  is not on home oxygen therapy.   Ventilator:    - No ventilator support    Lab orders for discharge:        Rehab Therapies: Physical Therapy, Occupational Therapy and nursing care  Weight Bearing Status/Restrictions: No weight bearing restrictions, anterior hip precautions, NO straight leg raises  Other Medical Equipment (for information only, NOT a DME order):  Rolling walker  Other Treatments: ASA 81mg twice at day for 30 days for DVT prophylaxis , bilateral knee high JR hose for 2 weeks after surgery    Patient's personal belongings (please select all that are sent with patient):  Glasses    RN SIGNATURE:  Electronically signed by Meño Delgado RN on 5/4/21 at 4:03 PM EDT    PHYSICIAN SECTION    Prognosis: Good    Condition at Discharge: Stable    Rehab Potential (if transferring to Rehab): Good    Physician Certification: I certify the above orders, information, and transfer of [place] Huff is necessary for the continuing treatment of the diagnosis listed and that he requires [address] for less 30 days. Update Admission H&P: No change in H&P    PHYSICIAN SIGNATURE:  Electronically signed by SHON Bartlett CNP on 4/30/21 at 12:18 PM EDT/ Dr Chakraborty Notice Dr Louisa Hendrickson in office 2 weeks after surgery   [facility] and Sports Medicine, [address] [place] [address],[address] ,   [phone]    CASE MANAGEMENT/SOCIAL WORK SECTION    Inpatient Status Date: 5/4/21    [facility] Readmission Risk Assessment Score:    Discharging to Facility/ Agency   · Name:  [facility].   · Address:  · Phone: [phone]  · Fax: [phone]      / signature: Electronically signed by Ronda Camp on 5/4/21 at 2:01 PM EDT  Activity:   Elevate your leg if swelling occurs in your ankle. Use elastic wraps/hose until swelling decreases.  Continue the exercise program as prescribed by physical therapists.  Take frequent walks.  Use walker, crutches, or cane with weight bearing instructions as indicated by the physical therapists.  Take rest periods often. Elevate leg during rest period.  Avoid sitting in low chairs or toilets without raised seats.  Keep knees apart. Sleep with a pillow between your legs.  Do not cross your legs, especially when putting on shoes and socks.   Wound Care:  [place] the wound with a sterile gauze dressing and change daily as long as there is drainage.  Do not scrub wound. Pat it dry with a soft towel.  Dont apply any lotions or creams to your wound.  Check the incision every day for redness, swelling, or increase in drainage. Diet:   You can resume your normal diet. There are no limits on your diet due to your surgery.  Pain pills and activity changes may lead to constipation. To prevent this, use prune juice or bran cereals liberally. You may need to use a laxative such as Dulcolax, Senokot, or Milk of Magnesia.  Drink plenty of fluids. Medications:   Take pain pills if needed to maintain comfort.  Never drive while taking pain medicine.  Avoid over the counter medications until checking with your doctor.  Resume previous medications as instructed by your doctor. You will be on Aspirin or Eliquis  for 30 days only. Stay off other anti-inflammatory medications (except Celebrex) while on blood thinners  Call Your Doctor If:  [facility] You have increased pain not controlled by medications.  Excessive swelling in your ankle.  You develop numbness, tingling, or decreased movement.  You have a fever greater than 100 degrees for a day or over 101 degrees at any one time.  Your wound becomes more reddened, starts draining, or opens.  If you fall. You have any questions about your recovery. ? Inform your family doctor/dentist or any other doctor who cares for you in the future that you have a joint replacement. You may need antibiotics for dental or surgical procedures if there is any evidence of infection present. ?  If you have required the use of insulin to control your blood sugar after surgery, follow up with your family doctor. ? Call your surgeons office to schedule your appointment to be seen after surgery. ? Make your appointment to continue physical therapy per doctors orders. ?  Smoking cessation assistance can be obtained from your family doctor or by calling [place] @ [phone]    _______________________________   _____   _______________________  ____                Patient Signature              Date      Witness                               Date          Anterior Approach  The main positions and movements to avoid after an anterior approach include bending the hip back, turning your hip and leg out, or spreading your leg outward.  Don't stretch your hip back. Walk with short steps. Taking a longer step when leading with your nonoperated hip stretches the surgical hip back.  Don't kneel only on one knee. Kneeling only on the surgical hip stretches the hip back. Use both knees when you must kneel down.  Don't turn your foot out. Place a pillow next to your hip and leg to keep your leg from turning or rolling out while lying on your back in bed.  Don't twist your body away from your operated hip. This means don't stand with your toes pointed out. Keep the toes of your affected leg pointed forward when you stand, sit, or walk. If you turn your body away from your surgical hip without pivoting your foot, your hip will be placed in an unsafe position. Remember to lift and turn your foot as you turn.  Don't swing your leg outward away from your body. This means scooting to the side in bed by supporting your surgical leg.  Don't put your leg in a straddling position, as though you are mounting a horse. This means preventing your leg from bending up and out when getting in or out of the bathtub.  Instead, hold your leg, and lift it straight up and over the edge of the tub

## 2021-04-30 NOTE — TELEPHONE ENCOUNTER
PATIENT IS NOT GOING TO MAKE IT ON TIME FOR HER APPT TODAY AND WANTS TO KNOW IF SHE CAN RESCHEDULE ANOTHER DAY. PLEASE CALL HER BACK -[phone].

## 2021-05-03 ENCOUNTER — OFFICE VISIT (OUTPATIENT)
Dept: ORTHOPEDIC SURGERY | Age: 56
End: 2021-05-03
Payer: COMMERCIAL

## 2021-05-03 ENCOUNTER — ANESTHESIA EVENT (OUTPATIENT)
Dept: OPERATING ROOM | Age: 56
DRG: 470 | End: 2021-05-03
Payer: COMMERCIAL

## 2021-05-03 VITALS — BODY MASS INDEX: 24.55 KG/M2 | RESPIRATION RATE: 18 BRPM | WEIGHT: 162 LBS | HEIGHT: 68 IN

## 2021-05-03 DIAGNOSIS — M87.052 AVASCULAR NECROSIS OF LEFT FEMUR (HCC): Primary | ICD-10-CM

## 2021-05-03 PROCEDURE — 99214 OFFICE O/P EST MOD 30 MIN: CPT | Performed by: ORTHOPAEDIC SURGERY

## 2021-05-03 RX ORDER — ACYCLOVIR 200 MG/1
CAPSULE ORAL
Qty: 90 CAPSULE | Refills: 0 | Status: SHIPPED | OUTPATIENT
Start: 2021-05-03 | End: 2021-07-13

## 2021-05-03 NOTE — PROGRESS NOTES
[facility]  Office Visit    Chief Complaint: Left hip pain    HPI:  Jadiel Alamo is a 49 y. o. who is here ahead of a planned left total hip arthroplasty surgery. For review, she has a known history of avascular necrosis in bilateral femoral heads and has been more symptomatic on the left. She did have a intra-articular steroid injection with Dr. Shaggy Iglesias in November 2020 which helped for 3 days. She currently rates the pain a 7/10. There is no history of injury or surgery to the hip. She did have a history of pituitary adenoma in 2018 and was on high-dose steroids at that time. The patient has difficulty putting on socks and shoes, difficulty getting out of a car, difficulty with ambulation and doing activities of daily living including pain at night. Pain at rest is 7 and with activities 9-10/10. She denies a history of diabetes, blood clot, anticoagulants, hormone replacement therapy, tobacco use, low back pain, narcotic drug usage, heart or lung issues, sleep apnea. She does continue to work. She walks without assistive device. Patient Active Problem List   Diagnosis    Hyperlipidemia    Anemia    Vitamin D deficiency    Hypertriglyceridemia    Asthma    Pituitary adenoma with extrasellar extension ([address]    Avascular necrosis of femur ([address]    Left knee pain       ROS:  Constitutional: denies fever, chills, weight loss  MSK: denies pain in other joints, muscle aches  Neurological: denies numbness, tingling, weakness    Exam:  Resp. rate 18, height 5' 8\" (1.727 m), weight 162 lb (73.5 kg), last menstrual period 02/07/2010, not currently breastfeeding. Appearance: sitting in exam room chair, appears to be in no acute distress, awake and alert  Resp: unlabored breathing on room air  Skin: warm, dry and intact with out erythema or significant increased temperature  Neuro: grossly intact both lower extremities. Intact sensation to light touch. Motor exam 4+ to 5/5 in all major motor groups. Left hip: Examination demonstrates pain with logroll and Stinchfield. There is brisk capillary refill. There are 2+ dorsalis pedis and posterior tibial pulses. Strength is 5/5 in hamstrings, quads, hip flexors. Left hip radiographs were reviewed today. Significant for avascular necrosis of the left femoral head without collapse. Assessment:  Left femoral head avascular necrosis     Plan:  We discussed the diagnosis and treatment options. She is having hip pain on a daily basis despite conservative management. We discussed anterior left total hip arthroplasty. The operative procedure, alternatives, and risks were discussed in detail with the patient. The risks include but are not limited to: Infection, vessel injury, nerve injury, DVT, pulmonary embolism, implant loosening, need for revision surgery, leg length discrepancy, dislocation, lateral femoral cutaneous nerve palsy, intraoperative fracture. Despite these risks the patient would like to proceed. All questions have been answered and no guarantees were made. I discussed with the patient the diagnosis in detail and answered all questions. The patient verbalized understanding of the plan as it has been described above and is in agreement. Plan for anterior left total hip arthroplasty. Preoperative labs reviewed. Hemoglobin 11.1 and vitamin D slightly below normal limits. She is taking vitamin D supplementation and iron supplementation. Her hemoglobin currently measures at its baseline. She has seen her endocrinologist preoperatively and he recommended postoperative steroids. Total time spent on today's encounter was at least 32 minutes.  This time included reviewing prior notes, radiographs, and lab results when available, reviewing history obtained by medical assistant, performing history and physical exam, reviewing tests/radiographs with the patient, counseling the patient, ordering medications or tests, documentation in the electronic health record, and coordination of care. This dictation was done with [employer] dictation and may contain mechanical errors related to translation.

## 2021-05-03 NOTE — PROGRESS NOTES
Discussed recommended periop steroids due to pt low cortisol levels with Dr Jcarlos Kim by phone at this time  Plan Hydrocortisone 50mg IV AM of surgery then  15mg qAM and 5mg q PM for 2 days then  10mg qAM and 5mg q PM for 2 days then  5mg q AM then 5gm qPM for 2 days then stop.

## 2021-05-04 ENCOUNTER — APPOINTMENT (OUTPATIENT)
Dept: GENERAL RADIOLOGY | Age: 56
DRG: 470 | End: 2021-05-04
Attending: ORTHOPAEDIC SURGERY
Payer: COMMERCIAL

## 2021-05-04 ENCOUNTER — HOSPITAL ENCOUNTER (INPATIENT)
Age: 56
LOS: 1 days | Discharge: HOME HEALTH | DRG: 470 | End: 2021-05-05
Attending: ORTHOPAEDIC SURGERY | Admitting: ORTHOPAEDIC SURGERY
Payer: COMMERCIAL

## 2021-05-04 ENCOUNTER — ANESTHESIA (OUTPATIENT)
Dept: OPERATING ROOM | Age: 56
DRG: 470 | End: 2021-05-04
Payer: COMMERCIAL

## 2021-05-04 VITALS
TEMPERATURE: 98.8 F | RESPIRATION RATE: 13 BRPM | SYSTOLIC BLOOD PRESSURE: 115 MMHG | OXYGEN SATURATION: 100 % | DIASTOLIC BLOOD PRESSURE: 58 MMHG

## 2021-05-04 DIAGNOSIS — M16.12 ARTHRITIS OF LEFT HIP: ICD-10-CM

## 2021-05-04 DIAGNOSIS — M87.052 AVASCULAR NECROSIS OF LEFT FEMUR (HCC): Primary | ICD-10-CM

## 2021-05-04 LAB
ABO/RH: NORMAL
ANTIBODY SCREEN: NORMAL
GLUCOSE BLD-MCNC: 137 MG/DL (ref 70–99)
GLUCOSE BLD-MCNC: 151 MG/DL (ref 70–99)
GLUCOSE BLD-MCNC: 151 MG/DL (ref 70–99)
PERFORMED ON: ABNORMAL

## 2021-05-04 PROCEDURE — 7100000000 HC PACU RECOVERY - FIRST 15 MIN: Performed by: ORTHOPAEDIC SURGERY

## 2021-05-04 PROCEDURE — 86850 RBC ANTIBODY SCREEN: CPT

## 2021-05-04 PROCEDURE — 97162 PT EVAL MOD COMPLEX 30 MIN: CPT

## 2021-05-04 PROCEDURE — 2500000003 HC RX 250 WO HCPCS: Performed by: NURSE ANESTHETIST, CERTIFIED REGISTERED

## 2021-05-04 PROCEDURE — 3600000015 HC SURGERY LEVEL 5 ADDTL 15MIN: Performed by: ORTHOPAEDIC SURGERY

## 2021-05-04 PROCEDURE — 6360000002 HC RX W HCPCS: Performed by: NURSE ANESTHETIST, CERTIFIED REGISTERED

## 2021-05-04 PROCEDURE — 2580000003 HC RX 258: Performed by: ANESTHESIOLOGY

## 2021-05-04 PROCEDURE — 2580000003 HC RX 258: Performed by: NURSE PRACTITIONER

## 2021-05-04 PROCEDURE — 73502 X-RAY EXAM HIP UNI 2-3 VIEWS: CPT

## 2021-05-04 PROCEDURE — 1200000000 HC SEMI PRIVATE

## 2021-05-04 PROCEDURE — 2580000003 HC RX 258: Performed by: ORTHOPAEDIC SURGERY

## 2021-05-04 PROCEDURE — 2720000010 HC SURG SUPPLY STERILE: Performed by: ORTHOPAEDIC SURGERY

## 2021-05-04 PROCEDURE — 88304 TISSUE EXAM BY PATHOLOGIST: CPT

## 2021-05-04 PROCEDURE — 97166 OT EVAL MOD COMPLEX 45 MIN: CPT

## 2021-05-04 PROCEDURE — 3600000005 HC SURGERY LEVEL 5 BASE: Performed by: ORTHOPAEDIC SURGERY

## 2021-05-04 PROCEDURE — 6370000000 HC RX 637 (ALT 250 FOR IP): Performed by: ORTHOPAEDIC SURGERY

## 2021-05-04 PROCEDURE — 6360000002 HC RX W HCPCS: Performed by: ORTHOPAEDIC SURGERY

## 2021-05-04 PROCEDURE — 97116 GAIT TRAINING THERAPY: CPT

## 2021-05-04 PROCEDURE — 6360000002 HC RX W HCPCS: Performed by: NURSE PRACTITIONER

## 2021-05-04 PROCEDURE — 7100000001 HC PACU RECOVERY - ADDTL 15 MIN: Performed by: ORTHOPAEDIC SURGERY

## 2021-05-04 PROCEDURE — 3700000000 HC ANESTHESIA ATTENDED CARE: Performed by: ORTHOPAEDIC SURGERY

## 2021-05-04 PROCEDURE — 94150 VITAL CAPACITY TEST: CPT

## 2021-05-04 PROCEDURE — 2709999900 HC NON-CHARGEABLE SUPPLY: Performed by: ORTHOPAEDIC SURGERY

## 2021-05-04 PROCEDURE — C1776 JOINT DEVICE (IMPLANTABLE): HCPCS | Performed by: ORTHOPAEDIC SURGERY

## 2021-05-04 PROCEDURE — 2700000000 HC OXYGEN THERAPY PER DAY

## 2021-05-04 PROCEDURE — 73501 X-RAY EXAM HIP UNI 1 VIEW: CPT

## 2021-05-04 PROCEDURE — 94761 N-INVAS EAR/PLS OXIMETRY MLT: CPT

## 2021-05-04 PROCEDURE — 86900 BLOOD TYPING SEROLOGIC ABO: CPT

## 2021-05-04 PROCEDURE — 97535 SELF CARE MNGMENT TRAINING: CPT

## 2021-05-04 PROCEDURE — 3209999900 FLUORO FOR SURGICAL PROCEDURES

## 2021-05-04 PROCEDURE — 88311 DECALCIFY TISSUE: CPT

## 2021-05-04 PROCEDURE — 0SRB04A REPLACEMENT OF LEFT HIP JOINT WITH CERAMIC ON POLYETHYLENE SYNTHETIC SUBSTITUTE, UNCEMENTED, OPEN APPROACH: ICD-10-PCS | Performed by: ORTHOPAEDIC SURGERY

## 2021-05-04 PROCEDURE — C1713 ANCHOR/SCREW BN/BN,TIS/BN: HCPCS | Performed by: ORTHOPAEDIC SURGERY

## 2021-05-04 PROCEDURE — 86901 BLOOD TYPING SEROLOGIC RH(D): CPT

## 2021-05-04 PROCEDURE — 3700000001 HC ADD 15 MINUTES (ANESTHESIA): Performed by: ORTHOPAEDIC SURGERY

## 2021-05-04 DEVICE — CABLE SURG L635MM DIA1.8MM CO CHROM CERCLAGE CBL RDY: Type: IMPLANTABLE DEVICE | Site: HIP | Status: FUNCTIONAL

## 2021-05-04 DEVICE — HEAD FEM DIA32MM +1MM OFFSET 12/14 TAPR HIP CERAMIC BIOLOX: Type: IMPLANTABLE DEVICE | Site: HIP | Status: FUNCTIONAL

## 2021-05-04 DEVICE — CUP ACET DIA50MM HIP GRIPTION PRI CEMENTLESS FIX SECT SER: Type: IMPLANTABLE DEVICE | Site: HIP | Status: FUNCTIONAL

## 2021-05-04 DEVICE — LINER ACET OD50MM ID32MM +4MM OFFSET HIP POLYETH MTL ON: Type: IMPLANTABLE DEVICE | Site: HIP | Status: FUNCTIONAL

## 2021-05-04 DEVICE — STEM FEM SZ 5 L105MM 12/14 TAPR HI OFFSET HIP DUOFIX CLLRD: Type: IMPLANTABLE DEVICE | Site: HIP | Status: FUNCTIONAL

## 2021-05-04 RX ORDER — SODIUM CHLORIDE 450 MG/100ML
INJECTION, SOLUTION INTRAVENOUS CONTINUOUS
Status: DISCONTINUED | OUTPATIENT
Start: 2021-05-04 | End: 2021-05-05 | Stop reason: HOSPADM

## 2021-05-04 RX ORDER — ONDANSETRON 2 MG/ML
4 INJECTION INTRAMUSCULAR; INTRAVENOUS
Status: DISCONTINUED | OUTPATIENT
Start: 2021-05-04 | End: 2021-05-04 | Stop reason: HOSPADM

## 2021-05-04 RX ORDER — MAGNESIUM HYDROXIDE 1200 MG/15ML
LIQUID ORAL CONTINUOUS PRN
Status: COMPLETED | OUTPATIENT
Start: 2021-05-04 | End: 2021-05-04

## 2021-05-04 RX ORDER — HYDROCORTISONE 5 MG/1
5 TABLET ORAL SEE ADMIN INSTRUCTIONS
Qty: 15 TABLET | Refills: 0 | Status: SHIPPED | OUTPATIENT
Start: 2021-05-04 | End: 2021-09-29

## 2021-05-04 RX ORDER — FENTANYL CITRATE 50 UG/ML
50 INJECTION, SOLUTION INTRAMUSCULAR; INTRAVENOUS EVERY 5 MIN PRN
Status: DISCONTINUED | OUTPATIENT
Start: 2021-05-04 | End: 2021-05-04 | Stop reason: HOSPADM

## 2021-05-04 RX ORDER — GABAPENTIN 300 MG/1
300 CAPSULE ORAL NIGHTLY
Qty: 30 CAPSULE | Refills: 0 | Status: SHIPPED | OUTPATIENT
Start: 2021-05-04 | End: 2021-05-05 | Stop reason: HOSPADM

## 2021-05-04 RX ORDER — OXYCODONE HYDROCHLORIDE 5 MG/1
5-10 TABLET ORAL EVERY 6 HOURS PRN
Qty: 40 TABLET | Refills: 0 | Status: SHIPPED | OUTPATIENT
Start: 2021-05-04 | End: 2021-05-10 | Stop reason: SDUPTHER

## 2021-05-04 RX ORDER — HYDROCORTISONE 5 MG/1
5 TABLET ORAL EVERY EVENING
Status: DISCONTINUED | OUTPATIENT
Start: 2021-05-05 | End: 2021-05-05 | Stop reason: HOSPADM

## 2021-05-04 RX ORDER — ROCURONIUM BROMIDE 10 MG/ML
INJECTION, SOLUTION INTRAVENOUS PRN
Status: DISCONTINUED | OUTPATIENT
Start: 2021-05-04 | End: 2021-05-04 | Stop reason: SDUPTHER

## 2021-05-04 RX ORDER — ASPIRIN 81 MG/1
81 TABLET ORAL 2 TIMES DAILY
Status: DISCONTINUED | OUTPATIENT
Start: 2021-05-04 | End: 2021-05-05 | Stop reason: HOSPADM

## 2021-05-04 RX ORDER — HYDROCORTISONE 10 MG/1
10 TABLET ORAL DAILY
Status: DISCONTINUED | OUTPATIENT
Start: 2021-05-07 | End: 2021-05-05 | Stop reason: HOSPADM

## 2021-05-04 RX ORDER — CELECOXIB 200 MG/1
200 CAPSULE ORAL ONCE
Status: COMPLETED | OUTPATIENT
Start: 2021-05-04 | End: 2021-05-04

## 2021-05-04 RX ORDER — LIDOCAINE HYDROCHLORIDE 20 MG/ML
INJECTION, SOLUTION EPIDURAL; INFILTRATION; INTRACAUDAL; PERINEURAL PRN
Status: DISCONTINUED | OUTPATIENT
Start: 2021-05-04 | End: 2021-05-04 | Stop reason: SDUPTHER

## 2021-05-04 RX ORDER — HYDROCORTISONE 5 MG/1
5 TABLET ORAL SEE ADMIN INSTRUCTIONS
Status: DISCONTINUED | OUTPATIENT
Start: 2021-05-05 | End: 2021-05-04 | Stop reason: SDUPTHER

## 2021-05-04 RX ORDER — FENTANYL CITRATE 50 UG/ML
INJECTION, SOLUTION INTRAMUSCULAR; INTRAVENOUS PRN
Status: DISCONTINUED | OUTPATIENT
Start: 2021-05-04 | End: 2021-05-04 | Stop reason: SDUPTHER

## 2021-05-04 RX ORDER — ASPIRIN 81 MG/1
81 TABLET ORAL 2 TIMES DAILY
Qty: 60 TABLET | Refills: 0 | Status: SHIPPED | OUTPATIENT
Start: 2021-05-04 | End: 2021-09-29

## 2021-05-04 RX ORDER — SENNA AND DOCUSATE SODIUM 50; 8.6 MG/1; MG/1
1 TABLET, FILM COATED ORAL 2 TIMES DAILY
Status: DISCONTINUED | OUTPATIENT
Start: 2021-05-04 | End: 2021-05-05 | Stop reason: HOSPADM

## 2021-05-04 RX ORDER — ONDANSETRON 2 MG/ML
INJECTION INTRAMUSCULAR; INTRAVENOUS PRN
Status: DISCONTINUED | OUTPATIENT
Start: 2021-05-04 | End: 2021-05-04 | Stop reason: SDUPTHER

## 2021-05-04 RX ORDER — HYDROCORTISONE 5 MG/1
5 TABLET ORAL DAILY
Qty: 15 TABLET | Refills: 0 | Status: SHIPPED | OUTPATIENT
Start: 2021-05-05 | End: 2021-05-04

## 2021-05-04 RX ORDER — DEXTROSE MONOHYDRATE 50 MG/ML
100 INJECTION, SOLUTION INTRAVENOUS PRN
Status: DISCONTINUED | OUTPATIENT
Start: 2021-05-04 | End: 2021-05-05 | Stop reason: HOSPADM

## 2021-05-04 RX ORDER — GABAPENTIN 300 MG/1
300 CAPSULE ORAL NIGHTLY
Status: DISCONTINUED | OUTPATIENT
Start: 2021-05-04 | End: 2021-05-05

## 2021-05-04 RX ORDER — ATORVASTATIN CALCIUM 10 MG/1
10 TABLET, FILM COATED ORAL DAILY
Status: DISCONTINUED | OUTPATIENT
Start: 2021-05-04 | End: 2021-05-05 | Stop reason: HOSPADM

## 2021-05-04 RX ORDER — INSULIN GLARGINE 100 [IU]/ML
0.25 INJECTION, SOLUTION SUBCUTANEOUS NIGHTLY
Status: DISCONTINUED | OUTPATIENT
Start: 2021-05-04 | End: 2021-05-05 | Stop reason: HOSPADM

## 2021-05-04 RX ORDER — ACETAMINOPHEN 325 MG/1
650 TABLET ORAL EVERY 6 HOURS
Status: DISCONTINUED | OUTPATIENT
Start: 2021-05-04 | End: 2021-05-05 | Stop reason: HOSPADM

## 2021-05-04 RX ORDER — SODIUM CHLORIDE 9 MG/ML
INJECTION, SOLUTION INTRAVENOUS CONTINUOUS
Status: DISCONTINUED | OUTPATIENT
Start: 2021-05-04 | End: 2021-05-04

## 2021-05-04 RX ORDER — PROMETHAZINE HYDROCHLORIDE 25 MG/ML
6.25 INJECTION, SOLUTION INTRAMUSCULAR; INTRAVENOUS
Status: DISCONTINUED | OUTPATIENT
Start: 2021-05-04 | End: 2021-05-04 | Stop reason: HOSPADM

## 2021-05-04 RX ORDER — SODIUM CHLORIDE 9 MG/ML
25 INJECTION, SOLUTION INTRAVENOUS PRN
Status: DISCONTINUED | OUTPATIENT
Start: 2021-05-04 | End: 2021-05-05 | Stop reason: HOSPADM

## 2021-05-04 RX ORDER — ONDANSETRON 2 MG/ML
4 INJECTION INTRAMUSCULAR; INTRAVENOUS EVERY 6 HOURS PRN
Status: DISCONTINUED | OUTPATIENT
Start: 2021-05-04 | End: 2021-05-05 | Stop reason: HOSPADM

## 2021-05-04 RX ORDER — GLYCOPYRROLATE 0.2 MG/ML
INJECTION INTRAMUSCULAR; INTRAVENOUS PRN
Status: DISCONTINUED | OUTPATIENT
Start: 2021-05-04 | End: 2021-05-04 | Stop reason: SDUPTHER

## 2021-05-04 RX ORDER — DEXAMETHASONE SODIUM PHOSPHATE 4 MG/ML
INJECTION, SOLUTION INTRA-ARTICULAR; INTRALESIONAL; INTRAMUSCULAR; INTRAVENOUS; SOFT TISSUE PRN
Status: DISCONTINUED | OUTPATIENT
Start: 2021-05-04 | End: 2021-05-04 | Stop reason: SDUPTHER

## 2021-05-04 RX ORDER — MORPHINE SULFATE 2 MG/ML
2 INJECTION, SOLUTION INTRAMUSCULAR; INTRAVENOUS
Status: DISCONTINUED | OUTPATIENT
Start: 2021-05-04 | End: 2021-05-05

## 2021-05-04 RX ORDER — HYDROCODONE BITARTRATE AND ACETAMINOPHEN 5; 325 MG/1; MG/1
2 TABLET ORAL PRN
Status: DISCONTINUED | OUTPATIENT
Start: 2021-05-04 | End: 2021-05-04 | Stop reason: HOSPADM

## 2021-05-04 RX ORDER — HYDROCORTISONE 5 MG/1
5 TABLET ORAL DAILY
Status: DISCONTINUED | OUTPATIENT
Start: 2021-05-09 | End: 2021-05-05 | Stop reason: HOSPADM

## 2021-05-04 RX ORDER — PROPOFOL 10 MG/ML
INJECTION, EMULSION INTRAVENOUS PRN
Status: DISCONTINUED | OUTPATIENT
Start: 2021-05-04 | End: 2021-05-04 | Stop reason: SDUPTHER

## 2021-05-04 RX ORDER — OXYCODONE HYDROCHLORIDE 10 MG/1
10 TABLET ORAL ONCE
Status: COMPLETED | OUTPATIENT
Start: 2021-05-04 | End: 2021-05-04

## 2021-05-04 RX ORDER — ESCITALOPRAM OXALATE 10 MG/1
10 TABLET ORAL DAILY
Status: DISCONTINUED | OUTPATIENT
Start: 2021-05-05 | End: 2021-05-05 | Stop reason: HOSPADM

## 2021-05-04 RX ORDER — ACYCLOVIR 200 MG/1
200 CAPSULE ORAL 3 TIMES DAILY
Status: DISCONTINUED | OUTPATIENT
Start: 2021-05-04 | End: 2021-05-05 | Stop reason: HOSPADM

## 2021-05-04 RX ORDER — MIDAZOLAM HYDROCHLORIDE 1 MG/ML
INJECTION INTRAMUSCULAR; INTRAVENOUS PRN
Status: DISCONTINUED | OUTPATIENT
Start: 2021-05-04 | End: 2021-05-04 | Stop reason: SDUPTHER

## 2021-05-04 RX ORDER — FENTANYL CITRATE 50 UG/ML
25 INJECTION, SOLUTION INTRAMUSCULAR; INTRAVENOUS EVERY 5 MIN PRN
Status: DISCONTINUED | OUTPATIENT
Start: 2021-05-04 | End: 2021-05-04 | Stop reason: HOSPADM

## 2021-05-04 RX ORDER — SODIUM CHLORIDE 0.9 % (FLUSH) 0.9 %
10 SYRINGE (ML) INJECTION PRN
Status: DISCONTINUED | OUTPATIENT
Start: 2021-05-04 | End: 2021-05-04 | Stop reason: HOSPADM

## 2021-05-04 RX ORDER — OXYCODONE HYDROCHLORIDE 5 MG/1
5 TABLET ORAL EVERY 4 HOURS PRN
Status: DISCONTINUED | OUTPATIENT
Start: 2021-05-04 | End: 2021-05-05 | Stop reason: HOSPADM

## 2021-05-04 RX ORDER — HYDROCODONE BITARTRATE AND ACETAMINOPHEN 5; 325 MG/1; MG/1
1 TABLET ORAL PRN
Status: DISCONTINUED | OUTPATIENT
Start: 2021-05-04 | End: 2021-05-04 | Stop reason: HOSPADM

## 2021-05-04 RX ORDER — OXYCODONE HYDROCHLORIDE 10 MG/1
10 TABLET ORAL EVERY 4 HOURS PRN
Status: DISCONTINUED | OUTPATIENT
Start: 2021-05-04 | End: 2021-05-05 | Stop reason: HOSPADM

## 2021-05-04 RX ORDER — SODIUM CHLORIDE 9 MG/ML
25 INJECTION, SOLUTION INTRAVENOUS PRN
Status: DISCONTINUED | OUTPATIENT
Start: 2021-05-04 | End: 2021-05-04 | Stop reason: HOSPADM

## 2021-05-04 RX ORDER — 0.9 % SODIUM CHLORIDE 0.9 %
250 INTRAVENOUS SOLUTION INTRAVENOUS ONCE
Status: COMPLETED | OUTPATIENT
Start: 2021-05-04 | End: 2021-05-04

## 2021-05-04 RX ORDER — MEPERIDINE HYDROCHLORIDE 25 MG/ML
12.5 INJECTION INTRAMUSCULAR; INTRAVENOUS; SUBCUTANEOUS
Status: DISCONTINUED | OUTPATIENT
Start: 2021-05-04 | End: 2021-05-04 | Stop reason: HOSPADM

## 2021-05-04 RX ORDER — NICOTINE POLACRILEX 4 MG
15 LOZENGE BUCCAL PRN
Status: DISCONTINUED | OUTPATIENT
Start: 2021-05-04 | End: 2021-05-05 | Stop reason: HOSPADM

## 2021-05-04 RX ORDER — SODIUM CHLORIDE 0.9 % (FLUSH) 0.9 %
10 SYRINGE (ML) INJECTION EVERY 12 HOURS SCHEDULED
Status: DISCONTINUED | OUTPATIENT
Start: 2021-05-04 | End: 2021-05-04 | Stop reason: HOSPADM

## 2021-05-04 RX ORDER — SODIUM CHLORIDE 0.9 % (FLUSH) 0.9 %
10 SYRINGE (ML) INJECTION PRN
Status: DISCONTINUED | OUTPATIENT
Start: 2021-05-04 | End: 2021-05-05 | Stop reason: HOSPADM

## 2021-05-04 RX ORDER — MORPHINE SULFATE 4 MG/ML
4 INJECTION, SOLUTION INTRAMUSCULAR; INTRAVENOUS
Status: DISCONTINUED | OUTPATIENT
Start: 2021-05-04 | End: 2021-05-05

## 2021-05-04 RX ORDER — SODIUM CHLORIDE 0.9 % (FLUSH) 0.9 %
10 SYRINGE (ML) INJECTION EVERY 12 HOURS SCHEDULED
Status: DISCONTINUED | OUTPATIENT
Start: 2021-05-04 | End: 2021-05-05 | Stop reason: HOSPADM

## 2021-05-04 RX ORDER — DEXTROSE MONOHYDRATE 25 G/50ML
12.5 INJECTION, SOLUTION INTRAVENOUS PRN
Status: DISCONTINUED | OUTPATIENT
Start: 2021-05-04 | End: 2021-05-05 | Stop reason: HOSPADM

## 2021-05-04 RX ORDER — HYDRALAZINE HYDROCHLORIDE 20 MG/ML
5 INJECTION INTRAMUSCULAR; INTRAVENOUS EVERY 10 MIN PRN
Status: DISCONTINUED | OUTPATIENT
Start: 2021-05-04 | End: 2021-05-04 | Stop reason: HOSPADM

## 2021-05-04 RX ORDER — ONDANSETRON 4 MG/1
4 TABLET, ORALLY DISINTEGRATING ORAL EVERY 8 HOURS PRN
Status: DISCONTINUED | OUTPATIENT
Start: 2021-05-04 | End: 2021-05-05 | Stop reason: HOSPADM

## 2021-05-04 RX ADMIN — ONDANSETRON 4 MG: 2 INJECTION INTRAMUSCULAR; INTRAVENOUS at 07:26

## 2021-05-04 RX ADMIN — GABAPENTIN 300 MG: 300 CAPSULE ORAL at 21:37

## 2021-05-04 RX ADMIN — OXYCODONE HYDROCHLORIDE 10 MG: 10 TABLET ORAL at 07:01

## 2021-05-04 RX ADMIN — PROPOFOL 150 MG: 10 INJECTION, EMULSION INTRAVENOUS at 07:36

## 2021-05-04 RX ADMIN — MORPHINE SULFATE 4 MG: 4 INJECTION, SOLUTION INTRAMUSCULAR; INTRAVENOUS at 22:42

## 2021-05-04 RX ADMIN — ACETAMINOPHEN 650 MG: 325 TABLET ORAL at 11:38

## 2021-05-04 RX ADMIN — ASPIRIN 81 MG: 81 TABLET, COATED ORAL at 21:00

## 2021-05-04 RX ADMIN — CEFAZOLIN SODIUM 2000 MG: 10 INJECTION, POWDER, FOR SOLUTION INTRAVENOUS at 21:38

## 2021-05-04 RX ADMIN — ROCURONIUM BROMIDE 40 MG: 10 INJECTION INTRAVENOUS at 07:36

## 2021-05-04 RX ADMIN — SODIUM CHLORIDE: 9 INJECTION, SOLUTION INTRAVENOUS at 07:01

## 2021-05-04 RX ADMIN — ACETAMINOPHEN 650 MG: 325 TABLET ORAL at 17:59

## 2021-05-04 RX ADMIN — MORPHINE SULFATE 2 MG: 2 INJECTION, SOLUTION INTRAMUSCULAR; INTRAVENOUS at 15:01

## 2021-05-04 RX ADMIN — DOCUSATE SODIUM 50 MG AND SENNOSIDES 8.6 MG 1 TABLET: 8.6; 5 TABLET, FILM COATED ORAL at 21:37

## 2021-05-04 RX ADMIN — SODIUM CHLORIDE: 4.5 INJECTION, SOLUTION INTRAVENOUS at 11:44

## 2021-05-04 RX ADMIN — ACYCLOVIR 200 MG: 200 CAPSULE ORAL at 21:37

## 2021-05-04 RX ADMIN — DEXAMETHASONE SODIUM PHOSPHATE 4 MG: 4 INJECTION, SOLUTION INTRAMUSCULAR; INTRAVENOUS at 07:43

## 2021-05-04 RX ADMIN — MIDAZOLAM 2 MG: 1 INJECTION INTRAMUSCULAR; INTRAVENOUS at 07:26

## 2021-05-04 RX ADMIN — CELECOXIB 200 MG: 200 CAPSULE ORAL at 07:01

## 2021-05-04 RX ADMIN — LIDOCAINE HYDROCHLORIDE 80 MG: 20 INJECTION, SOLUTION EPIDURAL; INFILTRATION; INTRACAUDAL; PERINEURAL at 07:36

## 2021-05-04 RX ADMIN — HYDROMORPHONE HYDROCHLORIDE 0.5 MG: 1 INJECTION, SOLUTION INTRAMUSCULAR; INTRAVENOUS; SUBCUTANEOUS at 08:27

## 2021-05-04 RX ADMIN — HYDROMORPHONE HYDROCHLORIDE 0.5 MG: 1 INJECTION, SOLUTION INTRAMUSCULAR; INTRAVENOUS; SUBCUTANEOUS at 08:24

## 2021-05-04 RX ADMIN — PROPOFOL 50 MG: 10 INJECTION, EMULSION INTRAVENOUS at 08:20

## 2021-05-04 RX ADMIN — FAMOTIDINE 20 MG: 10 INJECTION, SOLUTION INTRAVENOUS at 07:26

## 2021-05-04 RX ADMIN — SODIUM CHLORIDE 250 ML: 9 INJECTION, SOLUTION INTRAVENOUS at 16:15

## 2021-05-04 RX ADMIN — ACYCLOVIR 200 MG: 200 CAPSULE ORAL at 15:01

## 2021-05-04 RX ADMIN — CEFAZOLIN SODIUM 2000 MG: 10 INJECTION, POWDER, FOR SOLUTION INTRAVENOUS at 15:01

## 2021-05-04 RX ADMIN — HYDROCORTISONE SODIUM SUCCINATE 50 MG: 100 INJECTION, POWDER, FOR SOLUTION INTRAMUSCULAR; INTRAVENOUS at 07:05

## 2021-05-04 RX ADMIN — SUGAMMADEX 200 MG: 100 INJECTION, SOLUTION INTRAVENOUS at 08:59

## 2021-05-04 RX ADMIN — OXYCODONE HYDROCHLORIDE 10 MG: 10 TABLET ORAL at 11:39

## 2021-05-04 RX ADMIN — FENTANYL CITRATE 100 MCG: 50 INJECTION INTRAMUSCULAR; INTRAVENOUS at 07:36

## 2021-05-04 RX ADMIN — ACETAMINOPHEN 650 MG: 325 TABLET ORAL at 23:34

## 2021-05-04 RX ADMIN — OXYCODONE HYDROCHLORIDE 10 MG: 10 TABLET ORAL at 21:37

## 2021-05-04 RX ADMIN — CEFAZOLIN SODIUM 2000 MG: 10 INJECTION, POWDER, FOR SOLUTION INTRAVENOUS at 07:28

## 2021-05-04 RX ADMIN — GLYCOPYRROLATE 0.2 MG: 0.2 INJECTION, SOLUTION INTRAMUSCULAR; INTRAVENOUS at 09:04

## 2021-05-04 ASSESSMENT — PULMONARY FUNCTION TESTS
PIF_VALUE: 16
PIF_VALUE: 19
PIF_VALUE: 16
PIF_VALUE: 18
PIF_VALUE: 16
PIF_VALUE: 26
PIF_VALUE: 16
PIF_VALUE: 17
PIF_VALUE: 14
PIF_VALUE: 15
PIF_VALUE: 16
PIF_VALUE: 24
PIF_VALUE: 16
PIF_VALUE: 16
PIF_VALUE: 19
PIF_VALUE: 15
PIF_VALUE: 17
PIF_VALUE: 16
PIF_VALUE: 19
PIF_VALUE: 16
PIF_VALUE: 17
PIF_VALUE: 15
PIF_VALUE: 16
PIF_VALUE: 18
PIF_VALUE: 18
PIF_VALUE: 17
PIF_VALUE: 16
PIF_VALUE: 17
PIF_VALUE: 17
PIF_VALUE: 18
PIF_VALUE: 28
PIF_VALUE: 17
PIF_VALUE: 17
PIF_VALUE: 16
PIF_VALUE: 16
PIF_VALUE: 1
PIF_VALUE: 17
PIF_VALUE: 17
PIF_VALUE: 16
PIF_VALUE: 28
PIF_VALUE: 16
PIF_VALUE: 4
PIF_VALUE: 16
PIF_VALUE: 19

## 2021-05-04 ASSESSMENT — PAIN SCALES - GENERAL
PAINLEVEL_OUTOF10: 3
PAINLEVEL_OUTOF10: 0
PAINLEVEL_OUTOF10: 7
PAINLEVEL_OUTOF10: 4
PAINLEVEL_OUTOF10: 10
PAINLEVEL_OUTOF10: 8
PAINLEVEL_OUTOF10: 3

## 2021-05-04 ASSESSMENT — PAIN DESCRIPTION - ONSET
ONSET: ON-GOING

## 2021-05-04 ASSESSMENT — PAIN DESCRIPTION - PROGRESSION
CLINICAL_PROGRESSION: NOT CHANGED

## 2021-05-04 ASSESSMENT — PAIN DESCRIPTION - DESCRIPTORS
DESCRIPTORS: ACHING
DESCRIPTORS: ACHING;THROBBING
DESCRIPTORS: ACHING;THROBBING

## 2021-05-04 ASSESSMENT — PAIN DESCRIPTION - FREQUENCY
FREQUENCY: CONTINUOUS

## 2021-05-04 ASSESSMENT — PAIN - FUNCTIONAL ASSESSMENT
PAIN_FUNCTIONAL_ASSESSMENT: PREVENTS OR INTERFERES SOME ACTIVE ACTIVITIES AND ADLS
PAIN_FUNCTIONAL_ASSESSMENT: 0-10
PAIN_FUNCTIONAL_ASSESSMENT: PREVENTS OR INTERFERES SOME ACTIVE ACTIVITIES AND ADLS

## 2021-05-04 ASSESSMENT — PAIN DESCRIPTION - LOCATION
LOCATION: HIP

## 2021-05-04 ASSESSMENT — PAIN DESCRIPTION - ORIENTATION
ORIENTATION: LEFT

## 2021-05-04 ASSESSMENT — PAIN DESCRIPTION - PAIN TYPE
TYPE: SURGICAL PAIN

## 2021-05-04 NOTE — FLOWSHEET NOTE
05/04/21 0804   Encounter Summary   Services provided to: Patient and family together   Referral/Consult From: Nurse   Support System Family members   Place of [address] Visiting   (pre-op visit and prayer 5/4 CL)   Complexity of Encounter Moderate   Length of Encounter 15 minutes   Spiritual/Hinduism   Type Spiritual support   Assessment Calm; Approachable; Hopeful;Coping   Intervention Active listening;Explored feelings, thoughts, concerns;Prayer;Discussed belief system/Sabianism practices/marlon   Outcome Engaged in conversation;Coping; Hopeful

## 2021-05-04 NOTE — PROGRESS NOTES
Patient A& but still groggy from surgery. No complaints at this time. Call light within reach, able to make needs knows, fall precautions in place. Will continue to monitor.  Electronically signed by Surya Lara RN on 5/4/2021 at 6:14 PM

## 2021-05-04 NOTE — CARE COORDINATION
5/4  CM spoke with patient and confirmed JET Class plan to return home with [facility] and the support of her  . CM made referral to [facility]. #[phone]. They can accept for services. Patient states she will need walker,shower chair, and RTS - agreeable to referral to [facility] notified -will follow.   Electronically signed by Kimberly Pretty on 5/4/2021 at 1:55 PM  #[phone]

## 2021-05-04 NOTE — PROGRESS NOTES
Educated patient on purpose of 4 eyes skin assessment and asked patient if allowed to perform, patient verbalized understanding and agreed to assessment. 4 Eyes Skin Assessment     The patient is being assess for  Post-Op Surgical    I agree that 2 RN's have performed a thorough Head to Toe Skin Assessment on the patient. ALL assessment sites listed below have been assessed. Areas assessed by both nurses: krystyna and oswald  [x]   Head, Face, and Ears   [x]   Shoulders, Back, and Chest  [x]   Arms, Elbows, and Hands   [x]   Coccyx, Sacrum, and IschIum  [x]   Legs, Feet, and Heels        Does the Patient have Skin Breakdown?   No         Cal Prevention initiated:  Yes   Wound Care Orders initiated:  NA      [facility] nurse consulted for Pressure Injury (Stage 3,4, Unstageable, DTI, NWPT, and Complex wounds), New and Established Ostomies:  NA      Nurse 1 eSignature: Electronically signed by Kelli Dubon RN on 5/4/21 at 3:57 PM EDT    **SHARE this note so that the co-signing nurse is able to place an eSignature**    Nurse 2 eSignature: Electronically signed by Sparkle Mcknight RN on 5/4/21 at 6:01 PM EDT

## 2021-05-04 NOTE — ANESTHESIA PRE PROCEDURE
[facility] Department of Anesthesiology  Pre-Anesthesia Evaluation/Consultation       Name:  Susy Payne  : 1965  Age:  49 y. o. MRN:  [id number]  Date: 2021           Surgeon: Surgeon(s):  Meagan Quinn MD    Procedure: Procedure(s):  LEFT ANTERIOR TOTAL HIP REPLACEMENT WITH C-ARM     Allergies   Allergen Reactions    Other     Contrast [Iodides]      Rash all over body, low grade fever, body aches. Went to ER next day for treatment. Patient Active Problem List   Diagnosis    Hyperlipidemia    Anemia    Vitamin D deficiency    Hypertriglyceridemia    Asthma    Pituitary adenoma with extrasellar extension ([address]    Avascular necrosis of femur ([address]    Left knee pain     Past Medical History:   Diagnosis Date    Anemia     iron defieciency    Asthma     Brain tumor ([address]     surgery for brain tumor 3/15/18    Hemorrhagic cyst of ovary     Left    Hyperlipidemia     Tubular adenoma of colon 2018    COLONOSCOPY, DR CLAUDINE KHAN, TUBULAR ADENOMAS ASCENDING COLON, 3 MM, 4 MM, 5 MM REMOVED. Past Surgical History:   Procedure Laterality Date    BREAST ENHANCEMENT SURGERY      COLONOSCOPY  2018    COLONOSCOPY, DR CLAUDINE KHAN, 3 MM, 4 MM, 5 MM ASCENDING COLON POLYP REMOVED. LIMITED PREP. REPEAT 3-6 MONTHS.  EPIGASTRIC HERNIA REPAIR  10/30/2017    with mesh     OTHER SURGICAL HISTORY Left     lump removed from under left arm pit in her 31's    PITUITARY SURGERY N/A     brain surgery    TUBAL LIGATION Bilateral 6432    UMBILICAL HERNIA REPAIR  2016    and umbilectomy     Social History     Tobacco Use    Smoking status: Never Smoker    Smokeless tobacco: Never Used   Substance Use Topics    Alcohol use: No    Drug use: No     Medications  No current facility-administered medications on file prior to encounter.       Current Outpatient Medications on File Prior to Encounter   Medication Sig Dispense Refill    alendronate (FOSAMAX) 70 MG tablet Take 70 mg by mouth every 7 days      albuterol sulfate HFA (PROAIR HFA) 108 (90 Base) MCG/ACT inhaler Inhale 2 puffs into the lungs every 4 hours as needed for Wheezing 1 Inhaler 2     Current Facility-Administered Medications   Medication Dose Route Frequency Provider Last Rate Last Admin    0.9 % sodium chloride infusion   Intravenous Continuous Amaury Owen MD        sodium chloride flush 0.9 % injection 10 mL  10 mL Intravenous 2 times per day Amaury Owen MD        sodium chloride flush 0.9 % injection 10 mL  10 mL Intravenous PRN Amaury Owen MD        0.9 % sodium chloride infusion  25 mL Intravenous PRN Amaury Owen MD        hydrocortisone sodium succinate PF (SOLU-CORTEF) injection 50 mg  50 mg Intravenous Once Arthur Grace APRN - CNP        ceFAZolin (ANCEF) 2000 mg in dextrose 5 % 100 mL IVPB  2,000 mg Intravenous Once Hazel Zapata MD        celecoxib (CELEBREX) capsule 200 mg  200 mg Oral Once Hazel Zapata MD        oxyCODONE HCl (OXY-IR) immediate release tablet 10 mg  10 mg Oral Once Hazel Zapata MD         Vital Signs (Current)   There were no vitals filed for this visit. BP Readings from Last 3 Encounters:   21 120/82   21 102/72   21 117/72     Vital Signs Statistics (for past 48 hrs)     Resp  Av  Min: 25   Min taken time: 21 0812  Max: 18   Max taken time: 21 0812  BP Readings from Last 3 Encounters:   21 120/82   21 102/72   21 117/72       BMI  There is no height or weight on file to calculate BMI. Estimated body mass index is 24.63 kg/m² as calculated from the following:    Height as of 5/3/21: 5' 8\" (1.727 m). Weight as of 5/3/21: 162 lb (73.5 kg).     CBC   Lab Results   Component Value Date    WBC 5.5 2021    RBC 4.75 2021    HGB 11.1 2021    HCT 34.0 2021 MCV 71.7 04/26/2021    RDW 17.4 04/26/2021     04/26/2021     CMP    Lab Results   Component Value Date     04/26/2021    K 4.2 04/26/2021     04/26/2021    CO2 24 04/26/2021    BUN 10 04/26/2021    CREATININE 0.7 04/26/2021    GFRAA >60 04/26/2021    AGRATIO 1.6 03/01/2021    LABGLOM >60 04/26/2021    GLUCOSE 85 04/26/2021    PROT 7.5 03/01/2021    CALCIUM 10.0 04/26/2021    BILITOT 0.4 03/01/2021    ALKPHOS 65 03/01/2021    AST 12 03/01/2021    ALT 10 03/01/2021     BMP    Lab Results   Component Value Date     04/26/2021    K 4.2 04/26/2021     04/26/2021    CO2 24 04/26/2021    BUN 10 04/26/2021    CREATININE 0.7 04/26/2021    CALCIUM 10.0 04/26/2021    GFRAA >60 04/26/2021    LABGLOM >60 04/26/2021    GLUCOSE 85 04/26/2021     POCGlucose  No results for input(s): GLUCOSE in the last 72 hours.    Coags    Lab Results   Component Value Date    PROTIME 11.9 04/26/2021    INR 1.03 04/26/2021    APTT 34.1 46/42/9858     HCG (If Applicable) No results found for: PREGTESTUR, PREGSERUM, HCG, HCGQUANT   ABGs No results found for: PHART, PO2ART, PDC2KCF, CJN9PPS, BEART, B8BSPZPE   Type & Screen (If Applicable)  Lab Results   Component Value Date    LABABO A  POSITIVE   03/13/2018                            BMI: Wt Readings from Last 3 Encounters:       NPO Status:  >8h                          Anesthesia Evaluation  Patient summary reviewed no history of anesthetic complications:   Airway: Mallampati: II  TM distance: >3 FB   Neck ROM: full  Mouth opening: > = 3 FB Dental: normal exam         Pulmonary: breath sounds clear to auscultation  (+) asthma:     (-) COPD                           Cardiovascular:  Exercise tolerance: good (>4 METS),   (+) hyperlipidemia    (-) hypertension, past MI, CABG/stent and  angina        Rate: normal                    Neuro/Psych:      (-) seizures, TIA and CVA            ROS comment: Brain tumor GI/Hepatic/Renal:             Endo/Other:    (+) : arthritis: OA., malignancy/cancer. (-) diabetes mellitus, hypothyroidism               Abdominal:           Vascular:     - DVT and PE. Anesthesia Plan      general     ASA 3       Induction: intravenous. MIPS: Postoperative opioids intended and Prophylactic antiemetics administered. Anesthetic plan and risks discussed with patient. Plan discussed with CRNA. This pre-anesthesia assessment may be used as a history and physical.    DOS STAFF ADDENDUM:    Pt seen and examined, chart reviewed (including anesthesia, drug and allergy history). No interval changes to history and physical examination. Anesthetic plan, risks, benefits, alternatives, and personnel involved discussed with patient. Patient verbalized an understanding and agrees to proceed.       Giovanny Donahue MD  May 4, 2021  6:31 AM

## 2021-05-04 NOTE — PROGRESS NOTES
Physical Therapy    Facility/Department: [facility] [place] [facility]  Initial Assessment    NAME: Alejandrina Ham  : 1965  MRN: [id number]    Date of Service: 2021    Assessment / Discharge Recommendations:  -good effort with initial effort OOB to ambulate to the bathroom  -anticipate discharge to home with family assist and home PT   -will see in AM to assess readiness for home   -instructed in anterior hip precautions but will need to review tomorrow    Body structures, Functions, Activity limitations: Decreased functional mobility ; Decreased ADL status; Decreased strength  Prognosis: Good  Decision Making: Medium Complexity  REQUIRES PT FOLLOW UP: Yes  Activity Tolerance  Activity Tolerance: Patient limited by fatigue(low blood pressure)       Patient Diagnosis(es): The primary encounter diagnosis was Avascular necrosis of left femur ([facility] A diagnosis of Arthritis of left hip was also pertinent to this visit. has a past medical history of Anemia, Asthma, Brain tumor ([address] Hemorrhagic cyst of ovary, Hyperlipidemia, and Tubular adenoma of colon. has a past surgical history that includes other surgical history (Left); Tubal ligation (Bilateral, ); Umbilical hernia repair (2016); epigastric hernia repair (10/30/2017); Colonoscopy (2018); pituitary surgery (N/A); and Breast enhancement surgery.     Restrictions  Restrictions/Precautions  Restrictions/Precautions: Weight Bearing, Fall Risk  Position Activity Restriction  Other position/activity restrictions: anterior hip precautions - no extension + external rotation  Vision/Hearing  Vision: Impaired  Vision Exceptions: Wears glasses for reading  Hearing: Within functional limits     Subjective  General  Chart Reviewed: Yes  Patient assessed for rehabilitation services?: Yes  Additional Pertinent Hx: here for elective left THR  Response To Previous Treatment: Not applicable  Family / Caregiver Present: Yes  Follows Commands: Within per hour in easy pace  Comments: too sleepy for additional instruction   Plan   Plan  Times per week: 1-4 sessions  Current Treatment Recommendations: Strengthening, Transfer Training, Patient/Caregiver Education & Training, ADL/Self-care Training, Modalities, Gait Training, Positioning, Stair training  Safety Devices  Type of devices:  All fall risk precautions in place, Call light within reach, Bed alarm in place, Left in bed(Thom)  Goals  Short term goals  Time Frame for Short term goals: 1-2 days  Short term goal 1: bed mobility at [address] term goal 2: transfers at supervision/sba  Short term goal 3: ambulation at supervision/sba wbat rolling walker for household distances     -steps as needed for home entry at [address] [place] [address] term goal 4: exercises at supervision  Patient Goals   Patient goals : relief of chronic left hip pain       Therapy Time   Individual Concurrent Group Co-treatment   Time In 1145         Time Out 1215         Minutes Mahin Stark PT

## 2021-05-04 NOTE — PROGRESS NOTES
deficits / Impairments: Decreased functional mobility ; Decreased endurance;Decreased strength;Decreased ADL status; Decreased balance  Assessment: Pt is 49 y.o. F who presents with arthritis of L hip. Pt is s/p L JENNIFER and is WBAT with anterior precautions. PTA pt lives with  and daughter in two story home with 10 JAMES. Pt reports independence in self-care tasks, homemaking responsibilities, and functional mobility with no device. Currently, pt presents with ROM/strength in [employer] for self-care and transfers. Pt completed bed mobility with min A and sit <> stand transfers with min A. Pt completed short functional mobility with RW with min A however felt faint and required use of timmy stedy. Pt required assist for toileting - anticipate requiring mod A for ADL needs at this time. Anticipate pt will progress toward therapy goals and be safe to d/c home with 24/7 supervision/assist and home health OT. Prognosis: Fair;Good  Decision Making: Medium Complexity  History: PMH: brain tumor surgery 2018, colon surgery 2018  Exam: ADLs, transfers, func mob, bed mob  Assistance / Modification: min A for mobility, min/mod A for ADLs  OT Education: OT Role;ADL Adaptive Strategies; Plan of Care;Transfer Training  REQUIRES OT FOLLOW UP: Yes  Activity Tolerance  Activity Tolerance: Patient limited by fatigue;Patient limited by pain  Activity Tolerance: Pt reports she felt faint and dizzy while seated on toilet - able to assist back to bed in timmy stedy. Initial BP reading once supine 90/57, after several minutes with LEs elevated BP improved to 101/64. Pt remained positioned seated upright in bed. Safety Devices  Safety Devices in place: Yes(TERESE Calvo) in room upon exit)  Type of devices: Nurse notified;Gait belt;Call light within reach; Chair alarm in place; Left in chair           Patient Diagnosis(es): The primary encounter diagnosis was Avascular necrosis of left femur ([address]  A diagnosis of Arthritis of left hip was also pertinent to this visit. has a past medical history of Anemia, Asthma, Brain tumor ([address] Hemorrhagic cyst of ovary, Hyperlipidemia, and Tubular adenoma of colon. has a past surgical history that includes other surgical history (Left); Tubal ligation (Bilateral, 2005); Umbilical hernia repair (12/06/2016); epigastric hernia repair (10/30/2017); Colonoscopy (03/02/2018); pituitary surgery (N/A); and Breast enhancement surgery. Restrictions  Restrictions/Precautions  Restrictions/Precautions: Weight Bearing, Fall Risk  Position Activity Restriction  Other position/activity restrictions: anterior hip precautions - no extension + external rotation    Subjective   General  Chart Reviewed: Yes  Patient assessed for rehabilitation services?: Yes  Additional Pertinent Hx: Pt is 49 y.o. F who presents with arthritis of L hip. Pt is s/p L JENNIFER and is WBAT with anterior precautions. PMH: brain tumor surgery 2018, colon surgery 2018  Family / Caregiver Present: Yes()  Referring Practitioner: Carmelita Jensen MD  Diagnosis: Arthritis of L hip  Subjective  Subjective: Pt met bedside, agreeable for therapy evaluation, resting to urinate. Pt drowsy and sleepy however following cues. Pt became dizzy and light headed after toileting so returned to bed. Patient Currently in Pain: (Wincing in pain in L hip)  Pain Assessment  Pain Assessment: 0-10  Pain Level: 7  Patient's Stated Pain Goal: No pain  Pain Type: Surgical pain  Pain Location: Hip  Pain Orientation: Left  Pain Descriptors: Aching; Throbbing  Pain Frequency: Continuous  Pain Onset: On-going  Clinical Progression: Not changed  Functional Pain Assessment: Prevents or interferes some active activities and ADLs  Non-Pharmaceutical Pain Intervention(s): Cold applied; Rest  Response to Pain Intervention: Patient Satisfied  Multiple Pain Sites: No  Vital Signs  Pulse: 63  BP: 101/64  BP Location: Right Arm  Level of Consciousness: Alert (0)  Patient Currently in Pain: (Wincing in pain in L hip)  Oxygen Therapy  SpO2: 98 %     Social/Functional History  Social/Functional History  Lives With: Spouse, Daughter  Type of Home: House  Home Layout: Laundry in basement, 1/2 bath on main level, Bed/Bath upstairs, Able to Live on Main level with bedroom/bathroom(needs to go upstairs to bedroom)  Home Access: Stairs to enter with rails, Stairs to enter without rails  Entrance Stairs - Number of Steps: 5 w/ rails, 5 w/o rails  Bathroom Toilet: Standard  Bathroom Accessibility: Accessible  Home Equipment: Crutches  ADL Assistance: Independent  Homemaking Assistance: Independent  Ambulation Assistance: Independent  Transfer Assistance: Independent  Additional Comments: 1 fall in past 3 months, needs to check w/ father for shower chair, toilet raiser, RW per JET intake       Objective   Vision: Impaired  Vision Exceptions: Wears glasses for reading  Hearing: Within functional limits      Orientation  Overall Orientation Status: Within Functional Limits     Balance  Sitting Balance: Stand by assistance  Standing Balance: Contact guard assistance  Standing Balance  Time: ~2 minutes  Activity: Func mob, transfers, ADL tasks    Functional Mobility  Functional - Mobility Device: Rolling Walker  Activity: To/from bathroom  Assist Level: Minimal assistance  Functional Mobility Comments: Pt completed functional mobility from bed to toilet with RW with min A, very slow pace, effortful and required max cues. Pt felt faint on commode and required use of timmy stedy back to bed. Toilet Transfers  Toilet - Technique: Ambulating  Toilet Transfer: Minimal assistance  Toilet Transfers Comments: Sat to commode from RW however required use of timmy stedy back to bed d/t feeling faint    ADL  Toileting: Moderate assistance;Maximum assistance(Assist to manage gown and complete pericare)  Additional Comments: PTA pt reports independence in self-care tasks.  Anticipate pt to require min A for LB ADLs, SBA for UB ADLs, CGA for toileting. Declined further ADL needs. Tone RUE  RUE Tone: Normotonic  Tone LUE  LUE Tone: Normotonic  Coordination  Movements Are Fluid And Coordinated: Yes     Bed mobility  Supine to Sit: Minimal assistance  Sit to Supine: Moderate assistance;2 Person assistance(Increased assist d/t pt feeling faint)     Transfers  Sit to stand: Minimal assistance  Stand to sit: Minimal assistance  Transfer Comments: Min A for sit <> stand from EOB to RW, use of timmy stedy from toilet back to bed d/t drowsiness and feeling faint. Min A for transfer off timmy stedy to bed     Cognition  Overall Cognitive Status: WFL        Sensation  Overall Sensation Status: WFL        LUE AROM (degrees)  LUE AROM : WFL  Left Hand AROM (degrees)  Left Hand AROM: WFL  RUE AROM (degrees)  RUE AROM : WFL  Right Hand AROM (degrees)  Right Hand AROM: WFL     LUE Strength  Gross LUE Strength: WFL  RUE Strength  Gross RUE Strength: WFL          Plan   Plan  Times per week: 1 or 2 more sessions  Current Treatment Recommendations: Functional Mobility Training, Strengthening, Endurance Training, Balance Training, Safety Education & Training, Self-Care / ADL, Equipment Evaluation, Education, & procurement, Patient/Caregiver Education & Training    AM-PAC Score    Yi Elias scored a 18/24 on the AM-PAC ADL Inpatient form. Current research shows that an AM-PAC score of 18 or greater is typically associated with a discharge to the patient's home setting. Based on the patient's AM-PAC score, and their current ADL deficits, it is recommended that the patient have 2-3 sessions per week of Occupational Therapy at d/c to increase the patient's independence. At this time, this patient demonstrates the endurance and safety to discharge home with home health OT (home vs OP services) and a follow up treatment frequency of 2-3x/wk. Please see assessment section for further patient specific details.     If patient discharges prior to next

## 2021-05-04 NOTE — OP NOTE
Patient: Viviane Hutson  YOB: 1965  MRN: [id number]    Date of Procedure: 5/4/2021      Pre-Op Diagnosis: Avascular necrosis, left femoral head     Post-Op Diagnosis: Same       Procedure Performed:   1. Left anterior total hip arthroplasty  2. Prophylactic cable of calcar     Surgeon: Mack Zarate MD     Physician Assistant: JOVAN Jennings     Anesthesia: General     Estimated Blood Loss: 093 mL      Complications: None     Specimens: Femoral head    Implants:  Depuy [place] Gription cup, 50 mm  32 mm polyethylene liner, +4 mm offset  Depuy Actis stem, size 5 high offset  32 mm diameter ceramic femoral head, +1 mm offset    Indications: This is a 49 y.o. female with avascular necrosis of the left femoral head that continues to be painful despite conservative management. We discussed the diagnosis and treatment options and I recommended total hip arthroplasty. The operative procedure, alternatives, and risks were discussed in detail with the patient. The risks include but are not limited to: Infection, vessel injury, nerve injury, DVT, pulmonary embolism, implant loosening, need for revision surgery, leg length discrepancy, dislocation, lateral femoral cutaneous nerve palsy, intraoperative fracture. Informed consent for surgery was signed by the patient. Details: The patient was seen in the preoperative holding area where the site of surgery was marked and informed consent was confirmed. The patient was brought back to the operating room by OR personnel. General anesthesia was administered. The patient was positioned supine on the [place] table. The left lower extremity was then prepped and draped in a standard and sterile fashion. A final and formal timeout was then performed which confirmed the correct patient, correct position, and correct site of surgery. IV antibiotics were administered within 1 hour of the skin incision. A direct anterior supine approach was utilized.  The skin and subcutaneous tissue was dissected down to the body of the tensor fascia drake. Incision into the fascia of the TFL was made and dissection was carried medial to the tensor and lateral to the rectus femoris and sartorius. The anterior femoral circumflex vessels were identified and coagulated. The anterior capsule of the hip was exposed and an anterior capsulotomy was made. The femoral neck was exposed and protected by two cobra retractors. A proximal femoral osteotomy was performed and the femoral head was removed. The acetabulum was exposed and debrided of labral and capsular structures. Osteophytes and other acetabular debris were removed. The acetabulum was reamed under direct fluroscopic evaluation and then an uncemented 50 mm cup was hammered into place in the appropriate abduction and anteversion. This was checked with the C arm and was found to be in satisfacfory position. There were no screws used to secure the fixation of the implant to the floor of the acetabulum. A +4.0 mm 32 mm trial liner was placed. The proximal femur was exposed by using the hook from the [place] table, externally rotating, extending, and adducting the leg. The proximal femur was prepared to accept a 5 high offset trial stem. The hip was reduced after placing the +1 mm 32 mm head segment over the [place] taper of the trial implant. The C arm was brought in to confirm satisfactory hardware placement and leg length equality. The hip was dislocated and the trial implants were removed. At this point, it was noted that there was a partial thickness defect of the outer cortex of the posterior calcar, likely from a retractor digging into the bone while broaching the femur. The broach envelope was intact with no fracture evident on the inner cortex. The patient had softer bone in general, so the decision was made to place a prophylactic cable around the calcar prior to placing the actual stem.  The +4.0 mm 32 mm liner was placed. Again using the hook from the [place] table and extending, externally rotating, and adducting the hip, the number 5 high offset Actis stem was hammered into place in the appropriate anteversion. The +1 mm 32 mm ceramic head segment was placed over the Venkatesh Alar taper. The hip was reduced. The C arm was brought in and confirmed satisfactory postion of the implants and leg length equity. The wound was irrigated and hemostasis was obtained. The wound was injected with a mixture containing 90 cc ropivacaine . 5%, 10 mg morphine, 40 mg depomedrol, and 750 mg cefuroxime. The wound was bathed with Irrisept. The wound was closed in layers, a dressing was applied, and the patient was brought to recovery in satisfactory condition. JOVAN Blanco was essential in patient positioning, surgical assistance during the arthroplasty, and in wound closure.     Bridgett Olguin MD  5/4/2021

## 2021-05-04 NOTE — PROGRESS NOTES
Met with patient  at bedside, patient is oriented x4, drowsy falls asleep easily, RR >10/minute. discussed role of nurse navigator. Reviewed reasons to call with questions or concerns, importance of TEDS, Incentive spirometer, pain medication, and physical and occupational therapy. 2/4 bed rails up, bed in lowest position, fall precautions in place, call light within reach. Pulses present bilaterally +2 pedal, no drainage or odor noted at surgical dressing left hip. dry Dressing clean, dry, and intact. Ice in place. Aris and scds on BLEs. Neurovascular checks performed and WNLs, patient denies numbness or tingling. Hypotension noted and notified [place] Erik NP and bolus ordered. DC Plan: home with daughter daylin and [address] [place] [address] Avenue to transport patient. DME needs:needs rolling walker, agreeable to [employer] and saad informed. Shower chair and raised toilet seat needed and self pay, information provided to patient, she will decide once she is more awake.      Isadora Thompson  Orthopedic Nurse Navigator  Phone number: [phone]    Future Appointments   Date Time Provider David Stevenson   5/17/2021 10:30 AM MD Gerard Gamboa MMA     Electronically signed by Mason Luevano RN on 5/4/2021 at 3:58 PM

## 2021-05-04 NOTE — PROGRESS NOTES
[facility] Orthopedic Surgery   Progress Note      S/P :  SUBJECTIVE  In bed. Drowsy but oriented. Pain is   described in left hip and with the intensity of moderate. Pain is described as aching. OBJECTIVE              Physical                      VITALS:  /64   Pulse 63   Temp 97.9 °F (36.6 °C) (Oral)   Resp 16   Ht 5' 8\" (1.727 m)   Wt 163 lb (73.9 kg)   LMP 02/07/2010 (Exact Date)   SpO2 98%   BMI 24.78 kg/m²                     MUSCULOSKELETAL:  left foot NVI. Wiggles toes to command. Pedal pulses are palpable. NEUROLOGIC:                                  Sensory:  Touch:  Left Lower Extremity:  normal                                                 Surgical wound appears clean and dry left hip with gauze and tape dressing. Ice pack on. JR hose on.      Data       CBC:   Lab Results   Component Value Date    WBC 5.5 04/26/2021    RBC 4.75 04/26/2021    HGB 11.1 04/26/2021    HCT 34.0 04/26/2021    MCV 71.7 04/26/2021    MCH 23.3 04/26/2021    MCHC 32.5 04/26/2021    RDW 17.4 04/26/2021     04/26/2021    MPV 7.1 04/26/2021        WBC:    Lab Results   Component Value Date    WBC 5.5 04/26/2021        Hemoglobin/Hematocrit:    Lab Results   Component Value Date    HGB 11.1 04/26/2021    HCT 34.0 04/26/2021        PT/INR:    Lab Results   Component Value Date    PROTIME 11.9 04/26/2021    INR 1.03 04/26/2021              Current Inpatient Medications             Current Facility-Administered Medications: acyclovir (ZOVIRAX) capsule 200 mg, 200 mg, Oral, TID  atorvastatin (LIPITOR) tablet 10 mg, 10 mg, Oral, Daily  [START ON 5/5/2021] escitalopram (LEXAPRO) tablet 10 mg, 10 mg, Oral, Daily  gabapentin (NEURONTIN) capsule 300 mg, 300 mg, Oral, Nightly  insulin glargine (LANTUS) injection vial 18 Units, 0.25 Units/kg, Subcutaneous, Nightly  insulin lispro (HUMALOG) injection vial 6 Units, 0.08 Units/kg, Subcutaneous, TID WC  insulin lispro (HUMALOG) injection vial 0-6 Units, DVT prophylaxis  PT OT for ADL's and ambulation as tolerated  SS for DC planning, home with home care tomorrow  IV or PO pain med as ordered    Brennan Abdi 91  5/4/2021  1:50 PM

## 2021-05-04 NOTE — PROGRESS NOTES
Pt arrived from PACU, vital signs stable. Medication given for pain. Will cont to monitor and reassess.  Electronically signed by Tiara Banerjee RN on 5/4/2021 at 12:30 PM

## 2021-05-04 NOTE — CARE COORDINATION
Wendy/Charity received referral from RN-CM for [facility] [facility]. Will need PT notes and DME Orders. Per RN-CM, patient also needs RTS & SC. ITems not covered by insurance. Will discuss with patient. Will verify patient's insurance and follow up with patient to deliver the ordered item(s) prior to discharge.     Thank you for the referral.  Electronically signed by Ben Rojas on 5/4/2021 at 3:01 PM  Cell ph# [phone]

## 2021-05-04 NOTE — PROGRESS NOTES
Pt reported feeling dizzy with PT when she got up to go to the bathroom. Checked BP once back in bed and BP was 91/59. Elevated foot of bed and rechecked BP. Pt BP is now 101/64. Will cont to monitor and reassess. Pt denies dizziness at this time.  Electronically signed by Basim Milan RN on 5/4/2021 at 12:31 PM

## 2021-05-04 NOTE — PLAN OF CARE
Problem: Tissue Perfusion:  Goal: Ability to maintain adequate tissue perfusion will improve  Description: Ability to maintain adequate tissue perfusion will improve  Outcome: Ongoing  Note: Pt will maintain adequate tissue perfusion. Problem: Discharge Planning:  Goal: Knowledge of discharge instructions  Description: Knowledge of discharge instructions  Outcome: Ongoing  Note: Pt will be knowledgeable of discharge instructions. Problem: Infection - Surgical Site:  Goal: Signs of wound healing will improve  Description: Signs of wound healing will improve  Outcome: Ongoing  Note: Signs of wound healing will improve. Problem: Mobility - Impaired:  Goal: Achieve maximum mobility level  Description: Achieve maximum mobility level  Outcome: Ongoing  Note: Pt mobility is increasing. Pt continues with therapy. Will monitor. Problem: Pain - Acute:  Goal: Pain level will decrease  Description: Pain level will decrease  Outcome: Ongoing  Note: Pt assessed for pain. Pt in pain and assessed with 0-10 pain rating scale. Pt given prescribed analgesic for pain. (See eMar) Pt satisfied with pain relief thus far. Will reassess and continue to monitor. Problem: Falls - Risk of:  Goal: Will remain free from falls  Description: Will remain free from falls  Outcome: Ongoing  Note: Fall risk assessment completed. Fall precautions in place. Bed in lowest position, wheels locked, bed/chair exit alarm in place, call light within reach, and non skid footwear on. Walkway free of clutter. Pt alert and oriented and able to make needs known. Pt educated to use call light when needing to get up, and pt utilizes call light to make needs known. Will continue to monitor. Problem: Falls - Risk of:  Goal: Absence of physical injury  Description: Absence of physical injury  Outcome: Ongoing  Note: Pt is free of injury. No injury noted. Fall precautions in place. Call light within reach. Will monitor.

## 2021-05-05 VITALS
BODY MASS INDEX: 27.67 KG/M2 | HEART RATE: 70 BPM | RESPIRATION RATE: 16 BRPM | HEIGHT: 68 IN | OXYGEN SATURATION: 100 % | DIASTOLIC BLOOD PRESSURE: 59 MMHG | WEIGHT: 182.54 LBS | TEMPERATURE: 98.4 F | SYSTOLIC BLOOD PRESSURE: 108 MMHG

## 2021-05-05 LAB
ANION GAP SERPL CALCULATED.3IONS-SCNC: 11 MMOL/L (ref 3–16)
BUN BLDV-MCNC: 9 MG/DL (ref 7–20)
CALCIUM SERPL-MCNC: 8.4 MG/DL (ref 8.3–10.6)
CHLORIDE BLD-SCNC: 100 MMOL/L (ref 99–110)
CO2: 24 MMOL/L (ref 21–32)
CREAT SERPL-MCNC: 0.7 MG/DL (ref 0.6–1.1)
GFR AFRICAN AMERICAN: >60
GFR NON-AFRICAN AMERICAN: >60
GLUCOSE BLD-MCNC: 132 MG/DL (ref 70–99)
GLUCOSE BLD-MCNC: 132 MG/DL (ref 70–99)
GLUCOSE BLD-MCNC: 138 MG/DL (ref 70–99)
HCT VFR BLD CALC: 24.9 % (ref 36–48)
HEMOGLOBIN: 8.1 G/DL (ref 12–16)
MCH RBC QN AUTO: 23 PG (ref 26–34)
MCHC RBC AUTO-ENTMCNC: 32.7 G/DL (ref 31–36)
MCV RBC AUTO: 70.4 FL (ref 80–100)
PDW BLD-RTO: 18 % (ref 12.4–15.4)
PERFORMED ON: ABNORMAL
PERFORMED ON: ABNORMAL
PLATELET # BLD: 287 K/UL (ref 135–450)
PMV BLD AUTO: 6.9 FL (ref 5–10.5)
POTASSIUM SERPL-SCNC: 3.9 MMOL/L (ref 3.5–5.1)
RBC # BLD: 3.53 M/UL (ref 4–5.2)
SODIUM BLD-SCNC: 135 MMOL/L (ref 136–145)
WBC # BLD: 9.6 K/UL (ref 4–11)

## 2021-05-05 PROCEDURE — 80048 BASIC METABOLIC PNL TOTAL CA: CPT

## 2021-05-05 PROCEDURE — 97530 THERAPEUTIC ACTIVITIES: CPT

## 2021-05-05 PROCEDURE — 6370000000 HC RX 637 (ALT 250 FOR IP): Performed by: NURSE PRACTITIONER

## 2021-05-05 PROCEDURE — 6360000002 HC RX W HCPCS: Performed by: ORTHOPAEDIC SURGERY

## 2021-05-05 PROCEDURE — 85027 COMPLETE CBC AUTOMATED: CPT

## 2021-05-05 PROCEDURE — 2580000003 HC RX 258: Performed by: ORTHOPAEDIC SURGERY

## 2021-05-05 PROCEDURE — 97116 GAIT TRAINING THERAPY: CPT

## 2021-05-05 PROCEDURE — 97535 SELF CARE MNGMENT TRAINING: CPT

## 2021-05-05 PROCEDURE — 6370000000 HC RX 637 (ALT 250 FOR IP): Performed by: ORTHOPAEDIC SURGERY

## 2021-05-05 PROCEDURE — 97110 THERAPEUTIC EXERCISES: CPT

## 2021-05-05 PROCEDURE — 36415 COLL VENOUS BLD VENIPUNCTURE: CPT

## 2021-05-05 RX ORDER — CYCLOBENZAPRINE HCL 5 MG
5 TABLET ORAL 2 TIMES DAILY PRN
Qty: 10 TABLET | Refills: 0 | Status: SHIPPED | OUTPATIENT
Start: 2021-05-05 | End: 2021-05-10 | Stop reason: SDUPTHER

## 2021-05-05 RX ADMIN — SODIUM CHLORIDE: 4.5 INJECTION, SOLUTION INTRAVENOUS at 05:35

## 2021-05-05 RX ADMIN — Medication 10 ML: at 08:54

## 2021-05-05 RX ADMIN — INSULIN LISPRO 6 UNITS: 100 INJECTION, SOLUTION INTRAVENOUS; SUBCUTANEOUS at 12:42

## 2021-05-05 RX ADMIN — MORPHINE SULFATE 2 MG: 2 INJECTION, SOLUTION INTRAMUSCULAR; INTRAVENOUS at 07:58

## 2021-05-05 RX ADMIN — MORPHINE SULFATE 4 MG: 4 INJECTION, SOLUTION INTRAMUSCULAR; INTRAVENOUS at 03:39

## 2021-05-05 RX ADMIN — OXYCODONE HYDROCHLORIDE 10 MG: 10 TABLET ORAL at 02:09

## 2021-05-05 RX ADMIN — ESCITALOPRAM OXALATE 10 MG: 10 TABLET ORAL at 08:50

## 2021-05-05 RX ADMIN — DOCUSATE SODIUM 50 MG AND SENNOSIDES 8.6 MG 1 TABLET: 8.6; 5 TABLET, FILM COATED ORAL at 08:50

## 2021-05-05 RX ADMIN — ACYCLOVIR 200 MG: 200 CAPSULE ORAL at 08:51

## 2021-05-05 RX ADMIN — OXYCODONE HYDROCHLORIDE 10 MG: 10 TABLET ORAL at 11:53

## 2021-05-05 RX ADMIN — HYDROCORTISONE 15 MG: 10 TABLET ORAL at 08:51

## 2021-05-05 RX ADMIN — ATORVASTATIN CALCIUM 10 MG: 10 TABLET, FILM COATED ORAL at 08:51

## 2021-05-05 RX ADMIN — OXYCODONE HYDROCHLORIDE 10 MG: 10 TABLET ORAL at 06:30

## 2021-05-05 RX ADMIN — ASPIRIN 81 MG: 81 TABLET, COATED ORAL at 08:50

## 2021-05-05 RX ADMIN — ACYCLOVIR 200 MG: 200 CAPSULE ORAL at 13:58

## 2021-05-05 RX ADMIN — ACETAMINOPHEN 650 MG: 325 TABLET ORAL at 11:53

## 2021-05-05 RX ADMIN — ACETAMINOPHEN 650 MG: 325 TABLET ORAL at 05:23

## 2021-05-05 ASSESSMENT — PAIN DESCRIPTION - DESCRIPTORS
DESCRIPTORS: ACHING
DESCRIPTORS: ACHING
DESCRIPTORS: CONSTANT
DESCRIPTORS: ACHING

## 2021-05-05 ASSESSMENT — PAIN DESCRIPTION - LOCATION
LOCATION: HIP

## 2021-05-05 ASSESSMENT — PAIN DESCRIPTION - ONSET
ONSET: ON-GOING

## 2021-05-05 ASSESSMENT — PAIN DESCRIPTION - FREQUENCY
FREQUENCY: CONTINUOUS

## 2021-05-05 ASSESSMENT — PAIN SCALES - GENERAL
PAINLEVEL_OUTOF10: 3
PAINLEVEL_OUTOF10: 6
PAINLEVEL_OUTOF10: 7
PAINLEVEL_OUTOF10: 7
PAINLEVEL_OUTOF10: 8
PAINLEVEL_OUTOF10: 8
PAINLEVEL_OUTOF10: 7

## 2021-05-05 ASSESSMENT — PAIN DESCRIPTION - ORIENTATION
ORIENTATION: LEFT

## 2021-05-05 ASSESSMENT — PAIN DESCRIPTION - PAIN TYPE
TYPE: SURGICAL PAIN

## 2021-05-05 ASSESSMENT — PAIN DESCRIPTION - PROGRESSION
CLINICAL_PROGRESSION: GRADUALLY IMPROVING
CLINICAL_PROGRESSION: NOT CHANGED
CLINICAL_PROGRESSION: GRADUALLY IMPROVING
CLINICAL_PROGRESSION: GRADUALLY WORSENING

## 2021-05-05 NOTE — PLAN OF CARE
Problem: Tissue Perfusion:  Goal: Ability to maintain adequate tissue perfusion will improve  Description: Ability to maintain adequate tissue perfusion will improve  Outcome: Met This Shift  Note: The left hip is warm, the color is appropriate, she has brisk cap refill to the left foot. Problem: Discharge Planning:  Goal: Knowledge of discharge instructions  Description: Knowledge of discharge instructions  Outcome: Met This Shift  Note: Patient and family verbalized understanding of all discharge instructions      Problem: Infection - Surgical Site:  Goal: Signs of wound healing will improve  Description: Signs of wound healing will improve  Outcome: Met This Shift  Note: Prineo dressing is clean, dry, intact, the surgical incision is well approximated      Problem: Mobility - Impaired:  Goal: Achieve maximum mobility level  Description: Achieve maximum mobility level  Outcome: Met This Shift  Note: Patient worked with PT and OT, she started with a stedy for movement and before discharge she was using a walker. Problem: Pain - Acute:  Goal: Pain level will decrease  Description: Pain level will decrease  Outcome: Met This Shift  Note: Pain well controled with PRN pain medications. Problem: Falls - Risk of:  Goal: Will remain free from falls  Description: Will remain free from falls  Outcome: Met This Shift  Goal: Absence of physical injury  Description: Absence of physical injury  Outcome: Met This Shift     Problem: Skin Integrity:  Goal: Will show no infection signs and symptoms  Description: Will show no infection signs and symptoms  Outcome: Met This Shift  Goal: Absence of new skin breakdown  Description: Absence of new skin breakdown  Outcome: Met This Shift     Problem: Pain:  Goal: Pain level will decrease  Description: Pain level will decrease  Outcome: Met This Shift  Note: Pain well controled with PRN pain medications.    Goal: Control of acute pain  Description: Control of acute pain  Outcome: Met This Shift  Goal: Control of chronic pain  Description: Control of chronic pain  Outcome: Met This Shift

## 2021-05-05 NOTE — PROGRESS NOTES
Patient given prn pain medications as ordered. See mar. Patient currently rating her pain at at 7 on a -10 numerical scale.  Patient denies any n/v. Electronically signed by Franny Galloway RN on 5/5/2021 at 6:41 AM

## 2021-05-05 NOTE — CARE COORDINATION
[facility] delivered requested 2-Wheeled Gloria Mcbride to patient and reviewed insurance coverage and equipment set up with patient and spouse. Patient purchased her [employer] equipment online. Notified RN.     Thank you for the referral.  Electronically signed by Dann Duong on 5/5/2021 at 12:18 PM Cell ph# [phone]

## 2021-05-05 NOTE — PROGRESS NOTES
[facility]   Progress Note      S/P :  SUBJECTIVE  In bed. alert and oriented.  at bedside. Pt reports left leg spasms intermittently and requests muscle relaxer. BP has been running low . Pt also reports she does not take gabapentin at home due to [facility] be feel weird\" but was given last night. This is likely contributing to low BP. Pain is   described in left hip and with the intensity of moderate. Pain is described as burning, shooting. OBJECTIVE              Physical                      VITALS:  BP (!) 96/56   Pulse 78   Temp 98.6 °F (37 °C) (Oral)   Resp 15   Ht 5' 8\" (1.727 m)   Wt 182 lb 8.7 oz (82.8 kg)   LMP 02/07/2010 (Exact Date)   SpO2 99%   BMI 27.76 kg/m²                     MUSCULOSKELETAL:  left foot NVI. Wiggles toes to command. Pedal pulses are palpable. NEUROLOGIC:                                  Sensory:  Touch:  Left Lower Extremity:  normal                                                 Surgical wound appears clean and dry left hip with Prineo dressing. Ice pack on.  JR hose on    Data       CBC:   Lab Results   Component Value Date    WBC 9.6 05/05/2021    RBC 3.53 05/05/2021    HGB 8.1 05/05/2021    HCT 24.9 05/05/2021    MCV 70.4 05/05/2021    MCH 23.0 05/05/2021    MCHC 32.7 05/05/2021    RDW 18.0 05/05/2021     05/05/2021    MPV 6.9 05/05/2021        WBC:    Lab Results   Component Value Date    WBC 9.6 05/05/2021        Hemoglobin/Hematocrit:    Lab Results   Component Value Date    HGB 8.1 05/05/2021    HCT 24.9 05/05/2021        PT/INR:    Lab Results   Component Value Date    PROTIME 11.9 04/26/2021    INR 1.03 04/26/2021              Current Inpatient Medications             Current Facility-Administered Medications: acyclovir (ZOVIRAX) capsule 200 mg, 200 mg, Oral, TID  atorvastatin (LIPITOR) tablet 10 mg, 10 mg, Oral, Daily  escitalopram (LEXAPRO) tablet 10 mg, 10 mg, Oral, Daily  insulin glargine (LANTUS) injection vial prophylaxis ordered, ASA 81mg twice at day for 30 days for DVT prophylaxis  PT OT for ADL's and ambulation as tolerated  SS for DC planning, home with home care later today , allow Neurontin to wear off. IV or PO pain med as ordered.  Add Flexeril for spasms bid prn  Stop Neurontin, pt not taking at home and low BP    Yola Hoff [facility]  5/5/2021  8:52 AM

## 2021-05-05 NOTE — CARE COORDINATION
5/5 natividad w/ [place] w/ [facility] [place], [facility]. #[phone] aware of dc- they have auth and will pull orders from Epic.   Electronically signed by Yovanny Mejia on 5/5/2021 at 9:45 AM  #[phone]

## 2021-05-05 NOTE — PLAN OF CARE
Problem: Tissue Perfusion:  Goal: Ability to maintain adequate tissue perfusion will improve  Description: Ability to maintain adequate tissue perfusion will improve  5/5/2021 0006 by Phoebe Smith RN  Outcome: Ongoing  5/4/2021 1252 by Ge Beltrán RN  Outcome: Ongoing  Note: Pt will maintain adequate tissue perfusion. Problem: Discharge Planning:  Goal: Knowledge of discharge instructions  Description: Knowledge of discharge instructions  5/5/2021 0006 by Phoebe Smith RN  Outcome: Ongoing  5/4/2021 1252 by Ge Beltrán RN  Outcome: Ongoing  Note: Pt will be knowledgeable of discharge instructions. Problem: Infection - Surgical Site:  Goal: Signs of wound healing will improve  Description: Signs of wound healing will improve  5/5/2021 0006 by Phoebe Smith RN  Outcome: Ongoing  5/4/2021 1252 by Ge Beltrán RN  Outcome: Ongoing  Note: Signs of wound healing will improve. Problem: Mobility - Impaired:  Goal: Achieve maximum mobility level  Description: Achieve maximum mobility level  5/5/2021 0006 by Phoebe Smith RN  Outcome: Ongoing  5/4/2021 1252 by Ge Beltrán RN  Outcome: Ongoing  Note: Pt mobility is increasing. Pt continues with therapy. Will monitor. Problem: Pain - Acute:  Goal: Pain level will decrease  Description: Pain level will decrease  5/5/2021 0006 by Phoebe Smith RN  Outcome: Ongoing  5/4/2021 1252 by Ge Beltrán RN  Outcome: Ongoing  Note: Pt assessed for pain. Pt in pain and assessed with 0-10 pain rating scale. Pt given prescribed analgesic for pain. (See eMar) Pt satisfied with pain relief thus far. Will reassess and continue to monitor. Problem: Falls - Risk of:  Goal: Will remain free from falls  Description: Will remain free from falls  5/5/2021 0006 by Phoebe Smith RN  Outcome: Ongoing  5/4/2021 1252 by Ge Beltrán RN  Outcome: Ongoing  Note: Fall risk assessment completed.  Fall precautions in place. Bed in lowest position, wheels locked, bed/chair exit alarm in place, call light within reach, and non skid footwear on. Walkway free of clutter. Pt alert and oriented and able to make needs known. Pt educated to use call light when needing to get up, and pt utilizes call light to make needs known. Will continue to monitor. Goal: Absence of physical injury  Description: Absence of physical injury  5/5/2021 0006 by Yessenia Cordero RN  Outcome: Ongoing  5/4/2021 1252 by Arnaud Ivy RN  Outcome: Ongoing  Note: Pt is free of injury. No injury noted. Fall precautions in place. Call light within reach. Will monitor. Problem: Skin Integrity:  Goal: Will show no infection signs and symptoms  Description: Will show no infection signs and symptoms  Outcome: Ongoing  Goal: Absence of new skin breakdown  Description: Absence of new skin breakdown  Outcome: Ongoing     Problem: Pain:  Goal: Pain level will decrease  Description: Pain level will decrease  5/5/2021 0006 by Yessenia Cordero RN  Outcome: Ongoing  5/4/2021 1252 by Arnaud Ivy RN  Outcome: Ongoing  Note: Pt assessed for pain. Pt in pain and assessed with 0-10 pain rating scale. Pt given prescribed analgesic for pain. (See eMar) Pt satisfied with pain relief thus far. Will reassess and continue to monitor.      Goal: Control of acute pain  Description: Control of acute pain  Outcome: Ongoing  Goal: Control of chronic pain  Description: Control of chronic pain  Outcome: Ongoing

## 2021-05-05 NOTE — PROGRESS NOTES
Occupational Therapy  Facility/Department: [address] ORTHOPEDICS  Daily Treatment Note  NAME: Viviane Hutson  : 1965  MRN: [id number]    Date of Service: 2021    Discharge Recommendations:  24 hour supervision or assist, Home with [address]  [address] scored a 21/24 on the AM-PAC ADL Inpatient form. Current research shows that an AM-PAC score of 18 or greater is typically associated with a discharge to the patient's home setting. Based on the patient's AM-PAC score, and their current ADL deficits, it is recommended that the patient have 2-3 sessions per week of Occupational Therapy at d/c to increase the patient's independence. At this time, this patient demonstrates the endurance and safety to discharge home with home (home vs OP services) and a follow up treatment frequency of 2-3x/wk. Please see assessment section for further patient specific details. If patient discharges prior to next session this note will serve as a discharge summary. Please see below for the latest assessment towards goals. HOME HEALTH CARE: LEVEL 3 SAFETY        -Initial home health evaluation to occur within 24-48 hours, in patient home    -Home health agency to establish plan of care for patient over 60 day period    -Medication Reconciliation    -PT/OT/Speech evaluations in home within 24-48 hours of discharge; including  -DME and home safety    -Frontload therapy 5 days, then 3x a week    -OT to evaluate if patient has [address] needs for personal care    - evaluation within 24-48 hours, includes evaluation of resources   and insurance to determine [place], [place], LTC, and Medicaid options    -PCP Visit scheduled within three to seven days of discharge       Assessment: Discussed with OTR am pac score is 21. Anticipate that patient will be safe to return home with family support and home OT.  Patient able to complete functional  mobility with RW with SBA, slow, effortful gait with no overt LOB noted. Min A for supine to sit. SBA for sit<>stand from EOB to RW to sit to recliner chair. Possible discharge to home today. Patient Diagnosis(es): The primary encounter diagnosis was Avascular necrosis of left femur ([address] A diagnosis of Arthritis of left hip was also pertinent to this visit. has a past medical history of Anemia, Asthma, Brain tumor ([address] Hemorrhagic cyst of ovary, Hyperlipidemia, and Tubular adenoma of colon. has a past surgical history that includes other surgical history (Left); Tubal ligation (Bilateral, 2005); Umbilical hernia repair (12/06/2016); epigastric hernia repair (10/30/2017); Colonoscopy (03/02/2018); pituitary surgery (N/A); Breast enhancement surgery; and Total hip arthroplasty (Left, 5/4/2021). Restrictions  Restrictions/Precautions  Restrictions/Precautions: Weight Bearing, Fall Risk  Position Activity Restriction  Other position/activity restrictions: anterior hip precautions - no extension + external rotation  Subjective   General  Chart Reviewed: Yes  Patient assessed for rehabilitation services?: Yes  Additional Pertinent Hx: Pt is 49 y.o. F who presents with arthritis of L hip. Pt is s/p L JENNIFER and is WBAT with anterior precautions. PMH: brain tumor surgery 2018, colon surgery 2018  Response to previous treatment: Patient with no complaints from previous session  Family / Caregiver Present: Yes(spouse)  Referring Practitioner: Dara Fenton MD  Diagnosis: Arthritis of L hip  Subjective  Subjective: Patient supine in bed upon arrival to room. Patient agreeable to therapy.  Reports pain in left LE, ice placed at end of session      Orientation  Orientation  Overall Orientation Status: Within Functional Limits  Objective    ADL  Grooming: Setup;Stand by assistance(standing in front of sink to wash face and hands)  UE Dressing: Setup  LE Dressing: Stand by assistance(seated in recliner chair to dress lower body with use of AE) Balance  Sitting Balance: Supervision  Standing Balance: Stand by assistance  Standing Balance  Activity: Stood for ADL tasks  Functional Mobility  Functional - Mobility Device: Rolling Walker  Activity: To/from bathroom  Assist Level: Stand by assistance  Functional Mobility Comments: Functional mobility with RW with SBA, slow steady, effortful gait with no overt LOB noted. Bed mobility  Supine to Sit: Minimal assistance  Sit to Supine: Unable to assess(up in chair at end of session)  Transfers  Sit to stand: Stand by assistance  Stand to sit: Stand by assistance  Transfer Comments: SBA for sit<>stand from EOB to RW to sit to recliner chair     Cognition  Overall Cognitive Status: [facility]       Assessment   Performance deficits / Impairments: Decreased functional mobility ; Decreased endurance;Decreased strength;Decreased ADL status; Decreased balance  Assessment: Discussed with OTR am pac score is 21. Anticipate that patient will be safe to return home with family support and home OT. Patient able to complete functional  mobility with RW with SBA, slow, effortful gait with no overt LOB noted. Min A for supine to sit. SBA for sit<>stand from EOB to RW to sit to recliner chair. Possible discharge to home today. OT Education: OT Role;ADL Adaptive Strategies; Plan of Care;Transfer Training  Patient Education: Ice therapy, aime hose for 2 weeks and importance of mobility  REQUIRES OT FOLLOW UP: Yes  Activity Tolerance  Activity Tolerance: Patient Tolerated treatment well  Safety Devices  Safety Devices in place: Yes  Type of devices: Nurse notified;Gait belt;Call light within reach; Left in chair        Plan   Plan  Times per week: Possible discharge to home today  Current Treatment Recommendations: Functional Mobility Training, Strengthening, Endurance Training, Balance Training, Safety Education & Training, Self-Care / ADL, Equipment Evaluation, Education, & procurement, Patient/Caregiver Education & Training    AM-PAC Score        AM-[facility] Inpatient Daily Activity Raw Score: 21 (05/05/21 1244)  AM-PAC Inpatient ADL T-Scale Score : 44.27 (05/05/21 1244)  ADL Inpatient CMS 0-100% Score: 32.79 (05/05/21 1244)  ADL Inpatient CMS G-Code Modifier : Gustavo Blas (05/05/21 1244)    Goals  Short term goals  Time Frame for Short term goals: prior to d/c: all goals ongoing except where noted  Short term goal 1: Pt will complete functional mobility and transfers with SBA: met  Short term goal 2: Pt will complete toileting with SBA  Short term goal 3: Pt will complete dressing with SBA except JR hose: met  Short term goal 4: Pt will complete bathing with SBA  Short term goal 5: Pt will tolerate 3+ minutes of standing activity to increase strength and activity tolerance for self-care and transfers  Patient Goals   Patient goals : Pt did not state formal goal at this time, overall goal is to d/c home tomorrow       Therapy Time   Individual Concurrent Group Co-treatment   Time In 0945         Time Out 1058         Minutes 73                 Electronically signed by LAISHA [id number] on 5/5/2021 at 12:56 PM

## 2021-05-05 NOTE — CARE COORDINATION
Notified [employer] liaison of need for rolling walker and shower seat.    Electronically signed by Tyrone Negro on 5/5/2021 at 11:40 AM

## 2021-05-05 NOTE — PROGRESS NOTES
CLINICAL PHARMACY NOTE: MEDS TO [address] Select Patient?: No  Total # of Prescriptions Filled: 4   The following medications were delivered to the patient:  Current Discharge Medication List      START taking these medications    Details   cyclobenzaprine (FLEXERIL) 5 MG tablet Take 1 tablet by mouth 2 times daily as needed for Muscle spasms  Qty: 10 tablet, Refills: 0      oxyCODONE (ROXICODONE) 5 MG immediate release tablet Take 1-2 tablets by mouth every 6 hours as needed for Pain for up to 5 days.   Qty: 40 tablet, Refills: 0    Comments: Reduce doses taken as pain becomes manageable  Associated Diagnoses: Avascular necrosis of left femur (HCC)      aspirin 81 MG EC tablet Take 1 tablet by mouth 2 times daily  Qty: 60 tablet, Refills: 0      hydrocortisone (CORTEF) 5 MG tablet Take 1 tablet by mouth See Admin Instructions Beginning 5/5/21  Take 3 tablets in AM and 1 tablet at PM for 2 days then  Take 2 tablets in AM and 1 tablet at PM for 2 days then  Take 1 tablet in AM and 1 tablet at PM for 2 days then stop taking medication  Qty: 15 tablet, Refills: 0         ·   ·   Total # of Interventions Completed: 0  Time Spent (min): 30    Additional Documentation:

## 2021-05-05 NOTE — PROGRESS NOTES
David Serve to [facility] NP: She is having muscle spasms to her surgical leg. Pain control issues. She is worried about going home with these issues.

## 2021-05-05 NOTE — PROGRESS NOTES
Patient alert and oriented times four. Fall risk assessment completed. Fall precautions in place. Call light within reach. Pt educated on calling for assistance before getting up. Walkway free of clutter. Will continue to monitor. I/S encouraged as ordered, ambulation charted in daily cares.  Electronically signed by Donnie Solis RN on 5/5/2021 at 1:09 AM

## 2021-05-05 NOTE — PROGRESS NOTES
tandem of OT but not ready at that time to practice steps)  Pain Screening  Patient Currently in Pain: (rating pain to 7/10 to nursing - states it is tolerable to practice the steps)  Orientation  Orientation  Overall Orientation Status: Within Functional Limits  Social/Functional History  Social/Functional History  Lives With: Spouse, Daughter  Type of Home: House  Home Layout: Laundry in basement, 1/2 bath on main level, Bed/Bath upstairs, Able to Live on Main level with bedroom/bathroom(needs to go upstairs to bedroom)  Home Access: Stairs to enter with rails, Stairs to enter without rails  Entrance Stairs - Number of Steps: 5 w/ rails, 5 w/o rails  Bathroom Toilet: Standard  Bathroom Accessibility: Accessible  Home Equipment: Crutches  ADL Assistance: Independent  Homemaking Assistance: Independent  Ambulation Assistance: Independent  Transfer Assistance: Independent  Additional Comments: 1 fall in past 3 months, needs to check w/ father for shower chair, toilet raiser, RW per [facility] intake  Objective  Bed mobility  Supine to Sit: Minimal assistance  Sit to Supine: Unable to assess  Transfers  Sit to Stand: Stand by assistance;Supervision  Stand to sit: Stand by assistance;Supervision  Ambulation  Ambulation?: Yes  Ambulation 1  Surface: level tile  Device: Rolling Walker  Assistance: Supervision;Stand by assistance  Quality of Gait: step to pattern leading with her right LE (states more comfortable) with adequate base of support, good heel contact and fair weight bearing  Distance: in room for ~30 feet (able to manage more as needed for household distances)  Comments: steady on the walker  Stairs/Curb  Stairs?: (up and down 4 steps one rail and cga of one)  Balance  Comments: steady with transfers and ambulation  Exercises  Quad Sets: 10 per hour  Gluteal Sets: 10 per hour  Ankle Pumps: 30 per hour in easy pace  Comments: encouraged to continue practice with the spirometer at home for a few days     Plan Plan  Times per week: 1-4 sessions  Current Treatment Recommendations: Strengthening, Transfer Training, Patient/Caregiver Education & Training, ADL/Self-care Training, Modalities, Gait Training, Positioning, Stair training  Safety Devices  Type of devices:  All fall risk precautions in place, Call light within reach, Left in chair, Nurse notified(Divina Prettynadya Hsu)    Goals  Short term goals  Time Frame for Short term goals: 1-2 days  Short [facility] 2: transfers at supervision/sba  Short term goal 3: ambulation at supervision/sba wbat rolling walker for household distances     -steps as needed for home entry at [address] [place] [address] term goal 4: exercises at supervision  Patient Goals   Patient goals : relief of chronic left hip pain       Therapy Time   Individual Concurrent Group Co-treatment   Time In 1255         Time Out 1335         Minutes 111 Trent Pérez, PT

## 2021-05-06 ENCOUNTER — CARE COORDINATION (OUTPATIENT)
Dept: CASE MANAGEMENT | Age: 56
End: 2021-05-06

## 2021-05-06 NOTE — CARE COORDINATION
[facility] Transitions Initial Follow Up Call    Call within 2 business days of discharge: Yes    Patient: Jayla Fernandez Patient : 1965   MRN: [id number]  Reason for Admission: avascular necrosis of left femur  Discharge Date: 21 RARS: Readmission Risk Score: 11      Last Discharge [facility]       Complaint Diagnosis Description Type Department Provider    21  Avascular necrosis of left femur ([address] . .. Admission (Discharged) Gustavo Weller MD        Attempted to reach patient via phone for initial post hospital transition call. VM left  with my contact information requesting a return call. Writer spoke with Mariia Mercer from [facility], she verified that plan is for nurse to see patient today and PT/OT seeing patient tomorrow. Patient, Carlita Lehman, returned call. Challenges to be reviewed by the provider   Additional needs identified to be addressed with provider No  none             Method of communication with provider : none    Discussed COVID-19 related testing which was available at this time. Test results were negative. Patient informed of results, if available? Yes    Advance Care Planning:   Does patient have an Advance Directive:  not on file. Was this a readmission? No    Care Transition Nurse (CTN) contacted the patient by telephone to perform post hospital discharge assessment. Verified name and  with patient as identifiers. Provided introduction to self, and explanation of the CTN role. CTN reviewed discharge instructions, medical action plan and red flags with patient who verbalized understanding. Patient given an opportunity to ask questions and does not have any further questions or concerns at this time. Were discharge instructions available to patient? Yes.  Reviewed appropriate site of care based on symptoms and resources available to patient including: PCP and Specialist. The patient agrees to contact the PCP or surgeon office today.  Sparkle Soto stated ice could help with pain and swelling. Patient verbalized understanding. Patient stated has no signs of a fever.           Non-face-to-face services provided:  Obtained and reviewed discharge summary and/or continuity of care documents  Communication with home health agencies or other community services the patient is currently using-[facility]    Care Transitions 24 Hour Call    Care Transitions Interventions         Follow Up  Future Appointments   Date Time Provider David Stevenson   5/17/2021 10:30 AM MD RAUL Edgar RN

## 2021-05-07 ENCOUNTER — TELEPHONE (OUTPATIENT)
Dept: ORTHOPEDIC SURGERY | Age: 56
End: 2021-05-07

## 2021-05-07 NOTE — TELEPHONE ENCOUNTER
Medical Facility Question     Facility Name: [address] [place] [address]  Contact Name: Patric Mccann  Contact Number: [phone]  Request: NEED VERBAL ORDERS FOR PHYSICAL THERAPY   1 W1 3 W2

## 2021-05-10 ENCOUNTER — TELEPHONE (OUTPATIENT)
Dept: ORTHOPEDIC SURGERY | Age: 56
End: 2021-05-10

## 2021-05-10 DIAGNOSIS — M87.052 AVASCULAR NECROSIS OF LEFT FEMUR (HCC): ICD-10-CM

## 2021-05-10 RX ORDER — OXYCODONE HYDROCHLORIDE 5 MG/1
5-10 TABLET ORAL EVERY 6 HOURS PRN
Qty: 40 TABLET | Refills: 0 | Status: SHIPPED | OUTPATIENT
Start: 2021-05-10 | End: 2021-05-15

## 2021-05-10 RX ORDER — CYCLOBENZAPRINE HCL 5 MG
5 TABLET ORAL 2 TIMES DAILY PRN
Qty: 20 TABLET | Refills: 0 | Status: SHIPPED | OUTPATIENT
Start: 2021-05-10 | End: 2021-05-20

## 2021-05-10 NOTE — TELEPHONE ENCOUNTER
Pt would like a refill of Oxycodone and Muscle spasm meds she said.      Rafaela:   E[facility] and [facility]    Pt # [phone]

## 2021-05-12 ENCOUNTER — TELEPHONE (OUTPATIENT)
Dept: ORTHOPEDIC SURGERY | Age: 56
End: 2021-05-12

## 2021-05-12 ENCOUNTER — CARE COORDINATION (OUTPATIENT)
Dept: CASE MANAGEMENT | Age: 56
End: 2021-05-12

## 2021-05-17 ENCOUNTER — OFFICE VISIT (OUTPATIENT)
Dept: ORTHOPEDIC SURGERY | Age: 56
End: 2021-05-17

## 2021-05-17 VITALS — HEIGHT: 68 IN | WEIGHT: 162 LBS | BODY MASS INDEX: 24.55 KG/M2

## 2021-05-17 DIAGNOSIS — Z96.642 STATUS POST TOTAL REPLACEMENT OF LEFT HIP: Primary | ICD-10-CM

## 2021-05-17 PROCEDURE — 99024 POSTOP FOLLOW-UP VISIT: CPT | Performed by: PHYSICIAN ASSISTANT

## 2021-05-17 RX ORDER — CYCLOBENZAPRINE HCL 10 MG
10 TABLET ORAL NIGHTLY PRN
Qty: 30 TABLET | Refills: 0 | Status: SHIPPED | OUTPATIENT
Start: 2021-05-17 | End: 2021-05-27

## 2021-05-17 RX ORDER — OXYCODONE HYDROCHLORIDE AND ACETAMINOPHEN 5; 325 MG/1; MG/1
1 TABLET ORAL EVERY 6 HOURS PRN
Qty: 28 TABLET | Refills: 0 | Status: SHIPPED | OUTPATIENT
Start: 2021-05-17 | End: 2021-05-24

## 2021-05-19 ENCOUNTER — CARE COORDINATION (OUTPATIENT)
Dept: CASE MANAGEMENT | Age: 56
End: 2021-05-19

## 2021-05-19 NOTE — CARE COORDINATION
[facility] 45 Transitions Follow Up Call    2021    Patient: Kendell Rubin  Patient : 1965   MRN: [id number]  Reason for Admission: avascular necrosis of left femur  Discharge Date: 21 RARS: Readmission Risk Score: 11    Attempted to reach patient via phone for  post hospital transition call. VM left with my contact information requesting a return call. Care Transitions Subsequent and Final Call    Subsequent and Final Calls  Care Transitions Interventions  Other Interventions:            Follow Up  Future Appointments   Date Time Provider David Stevenson   2021  9:00 AM MD RAUL Tan ORTHO RUTH ANN Cruz RN

## 2021-05-21 ENCOUNTER — TELEPHONE (OUTPATIENT)
Dept: ORTHOPEDIC SURGERY | Age: 56
End: 2021-05-21

## 2021-05-21 NOTE — DISCHARGE SUMMARY
Physician Discharge Summary     Patient ID:  Constance Fernández  [id number]  49 y.o.  1965    Admit date: 5/4/2021    Discharge date and time: 5/5/2021  2:15 AM     Admitting Physician: Maryjane Rivera MD     Discharge Physician: Kelley Marshall    Admission Diagnoses: Arthritis of left hip [M16.12]  Avascular necrosis of left femur ([address] [M87.052]    Discharge Diagnoses: left hip AVN    Admission Condition: good    Discharged Condition: good    Indication for Admission: Failed conservative treatment as outpatient for joint pain including PT and pain meds. This patient was then electively scheduled for total joint replacement surgery. Surgical procedure: left JENNIFER    Consults: PT OT SS    This patient had no postoperative complications. They has PT and OT for ADL's . IV and PO pain med for pain control and was eventually DC in stable condition. She is under the care of Dr Alverto Rhodes, neurology for hx of brain tumor and periop stress dose steroids were issued at his request. She had postop drowsiness related to new start of Neurontin which was then held and the pt became more mentally alert prior to DC. Treatments: analgesia,  therapies: PT OT,  and surgery      Disposition: home    Patient Instructions:   [unfilled]  Activity: activity as tolerated  Diet: regular diet  Wound Care: keep wound clean and dry    Follow-up with TIANNA Malloy in 2 weeks.     Signed:  Shannon Hatchet, APRN - CNP  5/21/2021  1:18 PM

## 2021-05-21 NOTE — TELEPHONE ENCOUNTER
General Question     Subject: [place] MET ALL GOALS, DISCHARGED EFFECTIVE 5/21/21. Patient and /or Facility Request: [address] [place] [address] Number: [phone].

## 2021-05-22 NOTE — PROGRESS NOTES
This dictation was done with [employer] dictation and may contain mechanical errors related to translation. I have today reviewed with Abdoulaye Shoemaker the clinically relevant, past medical history, medications, allergies, family history, social history, and Review Of Systems form the patients most recent history form & I have documented any details relevant to today's presenting complaints in my history below. Ms. Abdoulaye Shoemaker self-reported past medical history, medications, allergies, family history, social history, and Review Of Systems form has been scanned into the chart under the \"Media\" tab. Subjective:  Abdoulaye Shoemaker is a 49 y. o. who is here postoperative left total hip replacement 5/4/2021. Pain scores a 5 out of 10 most all the time. She is taking oxycodone and is requesting a refill. I did send her for an AP pelvis and a 2 view left hip.       Patient Active Problem List   Diagnosis    Hyperlipidemia    Anemia    Vitamin D deficiency    Hypertriglyceridemia    Asthma    Pituitary adenoma with extrasellar extension ([address]    Avascular necrosis of femur ([address]    Left knee pain    Avascular necrosis of left femur ([address]           Current Outpatient Medications on File Prior to Visit   Medication Sig Dispense Refill    aspirin 81 MG EC tablet Take 1 tablet by mouth 2 times daily 60 tablet 0    hydrocortisone (CORTEF) 5 MG tablet Take 1 tablet by mouth See Admin Instructions Beginning 5/5/21  Take 3 tablets in AM and 1 tablet at PM for 2 days then  Take 2 tablets in AM and 1 tablet at PM for 2 days then  Take 1 tablet in AM and 1 tablet at PM for 2 days then stop taking medication 15 tablet 0    acyclovir (ZOVIRAX) 200 MG capsule TAKE 1 CAPSULE BY MOUTH THREE TIMES DAILY 90 capsule 0    Cholecalciferol (VITAMIN D) 50 MCG (2000 UT) CAPS capsule Take 1 capsule by mouth daily      ferrous sulfate (IRON 325) 325 (65 Fe) MG tablet Take 325 mg by mouth daily (with breakfast)      escitalopram (LEXAPRO) 10 MG tablet Take 1 tablet by mouth daily 60 tablet 0    atorvastatin (LIPITOR) 10 MG tablet TAKE 1 TABLET BY MOUTH EVERY DAY 90 tablet 0    albuterol sulfate HFA (PROAIR HFA) 108 (90 Base) MCG/ACT inhaler Inhale 2 puffs into the lungs every 4 hours as needed for Wheezing 1 Inhaler 2     No current facility-administered medications on file prior to visit. Objective:   Height 5' 8\" (1.727 m), weight 162 lb (73.5 kg), last menstrual period 02/07/2010, not currently breastfeeding. On examination this is a very pleasant 28-year-old female no acute distress she is alert and oriented x3 incisions healing nicely we talked about wound care and happy with her overall initial progress. She has good distal pulses good dorsiflexion and plantarflexion strength and a little bit of swelling around the incision but nothing abnormal.  Neuro exam grossly intact both lower extremities. Intact sensation to light touch. Motor exam 4+ to 5/5 in all major motor groups. Negative Thompson's sign. Skin is warm, dry and intact with out erythema or significant increased temperature around the knee joint(s). There are no cutaneous lesions or lymphadenopathy present. X-RAYS:  X-rays of her left hip show a stable left hip replacement. Three views of the total Hip arthroplasty were done today including an AP pelvis and a 2 view hip. This reveals satisfactory alignment of the prosthesis with good sizing and fit. There is good version of the cup and no subsiding of the stem. . No signs of significant polyethylene wear or failure. No progressive radiolucencies,fractures, tumors or dislocations. Assessment:  Stable healing left total hip replacement    Plan:  During today's visit, there was approximately 20 minutes of face-to-face discussion in regards to the patient's current condition and treatment options.  More than 50 % of the time was counseling and coordination of care as indicated above. Outpatient physical therapy, prescription for pain relief and for muscle x-ray was called in.   We will see her in follow-up in 4 weeks      PROCEDURE NOTE:   Examination x-rays      They will schedule a follow up in 4 weeks

## 2021-05-25 ENCOUNTER — HOSPITAL ENCOUNTER (OUTPATIENT)
Dept: PHYSICAL THERAPY | Age: 56
Setting detail: THERAPIES SERIES
Discharge: HOME | End: 2021-05-25
Payer: COMMERCIAL

## 2021-05-25 PROCEDURE — 97116 GAIT TRAINING THERAPY: CPT

## 2021-05-25 PROCEDURE — 97110 THERAPEUTIC EXERCISES: CPT

## 2021-05-25 PROCEDURE — 97140 MANUAL THERAPY 1/> REGIONS: CPT

## 2021-05-25 NOTE — FLOWSHEET NOTE
[address] [facility] [address]  Phone: [phone]   Fax:     [phone]    Physical Therapy Treatment Note/ Progress Report:     Date:  2021    Patient Name:  Desirae Urbina    :  1965  MRN: [id number]    Pertinent Medical History:     Medical/Treatment Diagnosis Information:  Diagnosis: Avascular necrosis of left femur ([address] (M87.052  Treatment Diagnosis: Left hip weakness limiting tolerance to walking, standing and adl's    Insurance/Certification information:  PT Insurance Information: Brittany Middleton  Physician Information:  Referring Practitioner: Dr Tiffanie Mack of care signed (Y/N): routed 21    Date of Patient follow up with Physician:      Progress Report: []  Yes  [x]  No     Date Range for reporting period:  Beginnin2021  Ending:      Progress report due (10 Rx/or 30 days whichever is less): 26     Recertification due (POC duration/ or 90 days whichever is less):     Visit # POC/Insurance Allowable Auth Needed   2 BOMN []Yes   []No     Latex Allergy:  [x]NO      []YES  Preferred Language for Healthcare:   [x]English       []Other:    Functional Scale:      Date assessed: at eval  Test:LEFS post eval 21 =39   Score:    Pain level:  2-3/10     History of Injury: DOS 21 for a L JENNIFER anterior approach     SUBJECTIVE:    21 Pt reports decreased hip pain now compared to prior to surgery.   Denies nay numbness at BLE's     OBJECTIVE: Gait pt walks with a rolling walker , balance with walker is good    Test measurements:    Functional testing Prehab        Date    Re eval   post op Date  4 week f/u   Date    8 week f/u  Date    D/C  Date           TUG        30 second sit to stand        6 minute walk                Balance        Narrow DARIN        Semi tandem        Tandem         SLS                Knee AROM        Knee Extension MMT R/L L=  R= ambulation and eccentric single leg control. [address] and Therapeutic Activities:    [] (91858 or 88168) Provided verbal/tactile cueing for activities related to improving balance, coordination, kinesthetic sense, posture, motor skill, proprioception and motor activation to allow for proper function of core, proximal hip and LE with self care and ADLs and functional mobility.   [] (32001) Gait Re-education- Provided training and instruction to the patient for proper LE, core and proximal hip recruitment and positioning and eccentric body weight control with ambulation re-education including up and down stairs     Gait Training:  [] (72524) Provided training and instruction to the patient for proper postural muscle recruitment and positioning with ambulation re-education     Home Exercise Program:    [x] (36387) Reviewed/Progressed HEP activities related to strengthening, flexibility, endurance, ROM of core, proximal hip and LE for functional self-care, mobility, lifting and ambulation/stair navigation   [] (07420)Reviewed/Progressed HEP activities related to improving balance, coordination, kinesthetic sense, posture, motor skill, proprioception of core, proximal hip and LE for self care, mobility, lifting, and ambulation/stair navigation      Manual Treatments:  PROM / STM / Oscillations-Mobs:  G-I, II, III, IV (PA's, Inf., Post.)  [] (19899) Provided manual therapy to mobilize LE, proximal hip and/or LS spine soft tissue/joints for the purpose of modulating pain, promoting relaxation,  increasing ROM, reducing/eliminating soft tissue swelling/inflammation/restriction, improving soft tissue extensibility and allowing for proper ROM for normal function with self care, mobility, lifting and ambulation.      Charges:  Timed Code Treatment Minutes: 45   Total Treatment Minutes: 48      [] EVAL (LOW) 84615 (typically 20 minutes face-to-face)  [] EVAL (MOD) 42690 (typically 30 minutes face-to-face)  [] EVAL (HIGH) 99083 (typically 45 minutes face-to-face)  [] RE-EVAL     [x] TF(84941) x     [] Dry needle 1 or 2 Muscles (26256)  [] NMR (04421) x     [] Dry needle 3+ Muscles (16780)  [x] Manual (44212) x     [] Ultrasound (49319) x  [] TA (33298) x     [] Mech Traction (44289)  [] ES(attended) (62892)     [] ES (un) (18277):   [] Vasopump (32245) [] Ionto (17509)   [x] Gait (95657)  [] Other:    GOALS:  Patient stated goal: better motion and relieve pain  [x]? Progressing: []? Met: []? Not Met: []? Adjusted  1. Pt will walk at a community level without a device   [x]? Progressing: []? Met: []? Not Met: []? Adjusted  2. Decrease c/o pain 0-1/10 with adl's   [x]? Progressing: []? Met: []? Not Met: []? Adjusted  3. Improver left hip and LE strength to 4/5 or better to facilitate patients goals   [x]? Progressing: []? Met: []? Not Met: []? Adjusted  ASSESSMENT:  See eval    Treatment/Activity Tolerance:  [x] Patient tolerated treatment well [] Patient limited by fatique  [] Patient limited by pain  [] Patient limited by other medical complications  [] Other:     Overall Progression Towards Functional goals/ Treatment Progress Update:  [x] Patient is progressing as expected towards functional goals listed. [] Progression is slowed due to complexities/Impairments listed. [] Progression has been slowed due to co-morbidities. [] Plan just implemented, too soon to assess goals progression <30days   [] Goals require adjustment due to lack of progress  [] Patient is not progressing as expected and requires additional follow up with physician  [] Other    Prognosis for POC: [x] Good [] Fair  [] Poor    Patient requires continued skilled intervention: [] Yes  [x] No        PLAN:  Progress gait to spc at community level as tolerated , progress hip and LE strength ,balance and proprioception     Patient is a good candidate for THR surgery and would benefit from outpatient rehab following surgery.      [x] Continue per plan of care [] Alter current plan (see comments)  [] Plan of care initiated [] Hold pending MD visit [] Discharge    Electronically signed by: Andrey Pierre PT    Note: If patient does not return for scheduled/recommended follow up visits, this note will serve as a discharge from care along with the most recent update on progress.

## 2021-05-27 ENCOUNTER — APPOINTMENT (OUTPATIENT)
Dept: PHYSICAL THERAPY | Age: 56
End: 2021-05-27
Payer: COMMERCIAL

## 2021-06-01 ENCOUNTER — HOSPITAL ENCOUNTER (OUTPATIENT)
Dept: PHYSICAL THERAPY | Age: 56
Setting detail: THERAPIES SERIES
Discharge: HOME | End: 2021-06-01
Payer: COMMERCIAL

## 2021-06-01 PROCEDURE — 97110 THERAPEUTIC EXERCISES: CPT

## 2021-06-01 NOTE — FLOWSHEET NOTE
[facility] [address]  Phone: [phone]   Fax:     [phone]    Physical Therapy Treatment Note/ Progress Report:     Date:  2021    Patient Name:  Yi Elias    :  1965  MRN: [id number]    Pertinent Medical History:     Medical/Treatment Diagnosis Information:  Diagnosis: Avascular necrosis of left femur ([address] (M87.052  Treatment Diagnosis: Left hip weakness limiting tolerance to walking, standing and adl's    Insurance/Certification information:  PT Insurance Information: [employer] [address] [employer]  Physician Information:  Referring Practitioner: Dr Dallin Giraldo of care signed (Y/N): routed 21    Date of Patient follow up with Physician:      Progress Report: []  Yes  [x]  No     Date Range for reporting period:  Beginnin2021  Ending:      Progress report due (10 Rx/or 30 days whichever is less): 95     Recertification due (POC duration/ or 90 days whichever is less):     Visit # POC/Insurance Allowable Auth Needed   3 BOMN []Yes   []No     Latex Allergy:  [x]NO      []YES  Preferred Language for Healthcare:   [x]English       []Other:    Functional Scale:      Date assessed: at eval  Test:LEFS post eval 21 =39   Score:    Pain level:  2-3/10     History of Injury: DOS 21 for a L JENNIFER anterior approach     SUBJECTIVE:    21 Pt reports decreased hip pain now compared to prior to surgery. Denies nay numbness at BLE's   21  20 min late   Pt reports no pain just stiffness.     OBJECTIVE: Gait pt walks with a rolling walker , balance with walker is good    Test measurements:    Functional testing Prehab        Date    Re eval   post op Date  4 week f/u   Date    8 week f/u  Date    D/C  Date           TUG        30 second sit to stand        6 minute walk                Balance        Narrow DARIN        Semi tandem        Tandem         SLS Knee AROM        Knee Extension MMT R/L L=  R= L=37  R=50      Hip aBduction MMT R/L L=  R= L=3#  R na      LEFS           RESTRICTIONS/PRECAUTIONS:     Exercises/Interventions:     Therapeutic Ex (56963)   Min: Reps/Resistance Notes/CUES   Nu step     incline 2 min     Mini squats  5\" 15 x     Standing alt hip abd. Alt 15 x ea         Mat ex     ppt 5\" 15 x    Bridge partial 5\" 15 x    clams     LAQ 5# 30 x    Hams curls 6K 30 x     SL'ing hip abd 3\" 15 x         Manual Intervention (17044)  Min:     Gentle DTM    Gentle PROM    Gentle stretch         NMR re-education (33966)  Min:     sls modified 10\" alt 3 min Opp. TTWB'ing   Weight shifting          Airex balance      wobble     Step ups                               Therapeutic Activity (10916)  Min:          Gait Training (91050)  Min:       5/25/21  The patient was educated on and practiced gait with a spc  on a level surface. They demonstrated proper technique, good safety awareness and good posture following the instruction. Balance was observed to be good. All patient questions regarding gait were answered. The patient was educated on stairs using a spc and a single railing  one step at a time for both ascending and descending stairs. they were instruction to lead up the strong RLE and lead down steps with the weaker ( LLE). \"Up with the good and down with the bad\" was used as a quip to help the patient remember the sequence. They demonstrated proper technique and verbalized understanding to stair climbing instruction. All patient questions regarding stairs were answered. 5/25/21 Advised pt to obtain an adjustable spc for use in home at this time, She will continue to use her rolling walker out of house   She verbalized understanding   Gait  6/1/21 Advised pt to walk without spc in home Pt verbalized understanding.               Modalities  Min:            Other Therapeutic Activities: Patient was thoroughly educated on this date regarding prehabilitation goals, importance of PT sessions in improving overall ROM, strength and stability prior to surgery, and how prehabilitation will facilitate improved post-operative outcomes. The patient was educated on and instructed in HEP as listed. The patient was given a detailed handout for exercises to initiate in the hospital post-operatively as well as at home. The discharge plan from the hospital was reviewed with the patient; specifically, to reduce length of hospital stay and to minimize time before reinitiating outpatient physical therapy after surgery. Education regarding early mobility post-operatively in the hospital and emphasis on working with both physical therapy and nursing staff to achieve ambulation goal was provided. The patient was highly encouraged to attend joint class in hospital prior to surgery for further instructions on pre and post-surgical care. Also, the patient was educated on precautions after hip replacement to avoid, specifically, hip extension and repetitive hip flexion. It is in my medical opinion that this patient is clear from all physical barriers prior to consideration for surgery, activity modifications prior to and post operatively have been discussed with this patient as well as discharge planning and is cleared for surgery from physical therapy perspective. Home Exercise Program:  Patient instructed in the above exercises for HEP. Patient verbalized/demonstrated understanding and was issued written handout. Also reviewed MD precaution following surgery with patient.       Therapeutic Exercise and NMR EXR  [] (58367) Provided verbal/tactile cueing for activities related to strengthening, flexibility, endurance, ROM for improvements in LE, proximal hip, and core control with self care, mobility, lifting, ambulation.  [] (95112) Provided verbal/tactile cueing for activities related to improving balance, coordination, kinesthetic sense, posture, motor skill, proprioception  to assist with LE, proximal hip, and core control in self care, mobility, lifting, ambulation and eccentric single leg control. [address] and Therapeutic Activities:    [] (72999 or 06041) Provided verbal/tactile cueing for activities related to improving balance, coordination, kinesthetic sense, posture, motor skill, proprioception and motor activation to allow for proper function of core, proximal hip and LE with self care and ADLs and functional mobility.   [] (28349) Gait Re-education- Provided training and instruction to the patient for proper LE, core and proximal hip recruitment and positioning and eccentric body weight control with ambulation re-education including up and down stairs     Gait Training:  [] (03888) Provided training and instruction to the patient for proper postural muscle recruitment and positioning with ambulation re-education     Home Exercise Program:    [x] (26443) Reviewed/Progressed HEP activities related to strengthening, flexibility, endurance, ROM of core, proximal hip and LE for functional self-care, mobility, lifting and ambulation/stair navigation   [] (27476)Reviewed/Progressed HEP activities related to improving balance, coordination, kinesthetic sense, posture, motor skill, proprioception of core, proximal hip and LE for self care, mobility, lifting, and ambulation/stair navigation      Manual Treatments:  PROM / STM / Oscillations-Mobs:  G-I, II, III, IV (PA's, Inf., Post.)  [] (69089) Provided manual therapy to mobilize LE, proximal hip and/or LS spine soft tissue/joints for the purpose of modulating pain, promoting relaxation,  increasing ROM, reducing/eliminating soft tissue swelling/inflammation/restriction, improving soft tissue extensibility and allowing for proper ROM for normal function with self care, mobility, lifting and ambulation.      Charges:  Timed Code Treatment Minutes: 35   Total Treatment Minutes: 35      [] EVAL (LOW) 45210 (typically 20 minutes face-to-face)  [] EVAL (MOD) 65072 (typically 30 minutes face-to-face)  [] EVAL (HIGH) 25518 (typically 45 minutes face-to-face)  [] RE-EVAL     [x] HU(56437) x 2    [] Dry needle 1 or 2 Muscles (68904)  [] NMR (73207) x     [] Dry needle 3+ Muscles (40796)  [] Manual (37991) x     [] Ultrasound (82021) x  [] TA (08286) x     [] Mech Traction (78386)  [] ES(attended) (33456)     [] ES (un) (72103):   [] Vasopump (04831) [] Ionto (80094)   [] Gait (51477)  [] Other:    GOALS:  Patient stated goal: better motion and relieve pain  [x]? Progressing: []? Met: []? Not Met: []? Adjusted  1. Pt will walk at a community level without a device   [x]? Progressing: []? Met: []? Not Met: []? Adjusted  2. Decrease c/o pain 0-1/10 with adl's   [x]? Progressing: []? Met: []? Not Met: []? Adjusted  3. Improver left hip and LE strength to 4/5 or better to facilitate patients goals   [x]? Progressing: []? Met: []? Not Met: []? Adjusted  ASSESSMENT:  See eval    Treatment/Activity Tolerance:  [x] Patient tolerated treatment well [] Patient limited by fatique  [] Patient limited by pain  [] Patient limited by other medical complications  [] Other:     Overall Progression Towards Functional goals/ Treatment Progress Update:  [x] Patient is progressing as expected towards functional goals listed. [] Progression is slowed due to complexities/Impairments listed. [] Progression has been slowed due to co-morbidities.   [] Plan just implemented, too soon to assess goals progression <30days   [] Goals require adjustment due to lack of progress  [] Patient is not progressing as expected and requires additional follow up with physician  [] Other    Prognosis for POC: [x] Good [] Fair  [] Poor    Patient requires continued skilled intervention: [] Yes  [x] No        PLAN:  ADD to HEP, increase NMR activities      Progress gait to spc at community level as tolerated , progress hip and LE strength ,balance and proprioception     Patient is a good candidate for THR surgery and would benefit from outpatient rehab following surgery. [x] Continue per plan of care [] Alter current plan (see comments)  [] Plan of care initiated [] Hold pending MD visit [] Discharge    Electronically signed by: Edilberto Persaud PT    Note: If patient does not return for scheduled/recommended follow up visits, this note will serve as a discharge from care along with the most recent update on progress.

## 2021-06-03 ENCOUNTER — HOSPITAL ENCOUNTER (OUTPATIENT)
Dept: PHYSICAL THERAPY | Age: 56
Setting detail: THERAPIES SERIES
Discharge: HOME | End: 2021-06-03
Payer: COMMERCIAL

## 2021-06-03 PROCEDURE — 97110 THERAPEUTIC EXERCISES: CPT

## 2021-06-03 NOTE — FLOWSHEET NOTE
Narrow DARIN        Semi tandem        Tandem         SLS                Knee AROM        Knee Extension MMT R/L L=  R= L=37  R=50      Hip aBduction MMT R/L L=  R= L=3#  R na      LEFS           RESTRICTIONS/PRECAUTIONS:     Exercises/Interventions:     Therapeutic Ex (42372)   Min: Reps/Resistance Notes/CUES   Nu step L4 x 5 min     Leg press [address] 75# 20 x 2         incline 2 min     Mini squats  5\" 15 x  Cues for equal WB'ing L and R    Standing alt hip abd. Alt 15 x ea         Mat ex     ppt    Bridge partial    clams    LAQ    Hams curls    SL'ing hip abd         Manual Intervention (10386)  Min:     Gentle DTM    Gentle PROM    Gentle stretch         NMR re-education (35464)  Min:     sls modified 10\" alt 3 min Opp. TTWB'ing   Weight shifting          Airex balance      wobble ADD    Step ups                               Therapeutic Activity (34073)  Min:          Gait Training (49353)  Min:       5/25/21  The patient was educated on and practiced gait with a spc  on a level surface. They demonstrated proper technique, good safety awareness and good posture following the instruction. Balance was observed to be good. All patient questions regarding gait were answered. The patient was educated on stairs using a spc and a single railing  one step at a time for both ascending and descending stairs. they were instruction to lead up the strong RLE and lead down steps with the weaker ( LLE). \"Up with the good and down with the bad\" was used as a quip to help the patient remember the sequence. They demonstrated proper technique and verbalized understanding to stair climbing instruction. All patient questions regarding stairs were answered. 5/25/21 Advised pt to obtain an adjustable spc for use in home at this time, She will continue to use her rolling walker out of house   She verbalized understanding   Gait  6/1/21 Advised pt to walk without spc in home Pt verbalized understanding. Modalities  Min:            Other Therapeutic Activities: Patient was thoroughly educated on this date regarding prehabilitation goals, importance of PT sessions in improving overall ROM, strength and stability prior to surgery, and how prehabilitation will facilitate improved post-operative outcomes. The patient was educated on and instructed in HEP as listed. The patient was given a detailed handout for exercises to initiate in the hospital post-operatively as well as at home. The discharge plan from the hospital was reviewed with the patient; specifically, to reduce length of hospital stay and to minimize time before reinitiating outpatient physical therapy after surgery. Education regarding early mobility post-operatively in the hospital and emphasis on working with both physical therapy and nursing staff to achieve ambulation goal was provided. The patient was highly encouraged to attend joint class in hospital prior to surgery for further instructions on pre and post-surgical care. Also, the patient was educated on precautions after hip replacement to avoid, specifically, hip extension and repetitive hip flexion. It is in my medical opinion that this patient is clear from all physical barriers prior to consideration for surgery, activity modifications prior to and post operatively have been discussed with this patient as well as discharge planning and is cleared for surgery from physical therapy perspective. Home Exercise Program:  Patient instructed in the above exercises for HEP. Patient verbalized/demonstrated understanding and was issued written handout. Also reviewed MD precaution following surgery with patient.       Therapeutic Exercise and NMR EXR  [] (61333) Provided verbal/tactile cueing for activities related to strengthening, flexibility, endurance, ROM for improvements in LE, proximal hip, and core control with self care, mobility, lifting, ambulation.  [] (32528) Provided verbal/tactile cueing for activities related to improving balance, coordination, kinesthetic sense, posture, motor skill, proprioception  to assist with LE, proximal hip, and core control in self care, mobility, lifting, ambulation and eccentric single leg control. [address] and Therapeutic Activities:    [] (42505 or 36053) Provided verbal/tactile cueing for activities related to improving balance, coordination, kinesthetic sense, posture, motor skill, proprioception and motor activation to allow for proper function of core, proximal hip and LE with self care and ADLs and functional mobility.   [] (30324) Gait Re-education- Provided training and instruction to the patient for proper LE, core and proximal hip recruitment and positioning and eccentric body weight control with ambulation re-education including up and down stairs     Gait Training:  [] (60334) Provided training and instruction to the patient for proper postural muscle recruitment and positioning with ambulation re-education     Home Exercise Program:    [x] (03009) Reviewed/Progressed HEP activities related to strengthening, flexibility, endurance, ROM of core, proximal hip and LE for functional self-care, mobility, lifting and ambulation/stair navigation   [] (20467)Reviewed/Progressed HEP activities related to improving balance, coordination, kinesthetic sense, posture, motor skill, proprioception of core, proximal hip and LE for self care, mobility, lifting, and ambulation/stair navigation      Manual Treatments:  PROM / STM / Oscillations-Mobs:  G-I, II, III, IV (PA's, Inf., Post.)  [] (01011) Provided manual therapy to mobilize LE, proximal hip and/or LS spine soft tissue/joints for the purpose of modulating pain, promoting relaxation,  increasing ROM, reducing/eliminating soft tissue swelling/inflammation/restriction, improving soft tissue extensibility and allowing for proper ROM for normal function with self care, mobility, lifting and ambulation. Charges:  Timed Code Treatment Minutes: 35   Total Treatment Minutes: 35    6/3/21 Pt needed to leave early for work  [] EVAL (LOW) 455 1011 (typically 20 minutes face-to-face)  [] EVAL (MOD) 31677 (typically 30 minutes face-to-face)  [] EVAL (HIGH) 57465 (typically 45 minutes face-to-face)  [] RE-EVAL     [x] XQ(74876) x 2    [] Dry needle 1 or 2 Muscles (01161)  [] NMR (17642) x     [] Dry needle 3+ Muscles (99681)  [] Manual (66575) x     [] Ultrasound (19661) x  [] TA (25148) x     [] Mech Traction (52692)  [] ES(attended) (78704)     [] ES (un) (04267):   [] Vasopump (79201) [] Ionto (06584)   [] Gait (22359)  [] Other:    GOALS:  Patient stated goal: better motion and relieve pain  [x]? Progressing: []? Met: []? Not Met: []? Adjusted  1. Pt will walk at a community level without a device   [x]? Progressing: []? Met: []? Not Met: []? Adjusted  2. Decrease c/o pain 0-1/10 with adl's   [x]? Progressing: []? Met: []? Not Met: []? Adjusted  3. Improver left hip and LE strength to 4/5 or better to facilitate patients goals   [x]? Progressing: []? Met: []? Not Met: []? Adjusted  ASSESSMENT:  See eval    Treatment/Activity Tolerance:  [x] Patient tolerated treatment well [] Patient limited by fatique  [] Patient limited by pain  [] Patient limited by other medical complications  [] Other:     Overall Progression Towards Functional goals/ Treatment Progress Update:  [x] Patient is progressing as expected towards functional goals listed. [] Progression is slowed due to complexities/Impairments listed. [] Progression has been slowed due to co-morbidities.   [] Plan just implemented, too soon to assess goals progression <30days   [] Goals require adjustment due to lack of progress  [] Patient is not progressing as expected and requires additional follow up with physician  [] Other    Prognosis for POC: [x] Good [] Fair  [] Poor    Patient requires continued skilled intervention: [] Yes  [x] No        PLAN:  ADD to HEP, increase NMR activities      Progress gait to spc at community level as tolerated , progress hip and LE strength ,balance and proprioception     Patient is a good candidate for THR surgery and would benefit from outpatient rehab following surgery. [x] Continue per plan of care [] Alter current plan (see comments)  [] Plan of care initiated [] Hold pending MD visit [] Discharge    Electronically signed by: Andrey Pierre PT    Note: If patient does not return for scheduled/recommended follow up visits, this note will serve as a discharge from care along with the most recent update on progress.

## 2021-06-05 DIAGNOSIS — F32.1 CURRENT MODERATE EPISODE OF MAJOR DEPRESSIVE DISORDER, UNSPECIFIED WHETHER RECURRENT (HCC): ICD-10-CM

## 2021-06-07 RX ORDER — ESCITALOPRAM OXALATE 10 MG/1
10 TABLET ORAL DAILY
Qty: 60 TABLET | Refills: 2 | Status: SHIPPED | OUTPATIENT
Start: 2021-06-07 | End: 2021-07-16 | Stop reason: SDUPTHER

## 2021-06-08 ENCOUNTER — CARE COORDINATION (OUTPATIENT)
Dept: CASE MANAGEMENT | Age: 56
End: 2021-06-08

## 2021-06-08 ENCOUNTER — HOSPITAL ENCOUNTER (OUTPATIENT)
Dept: PHYSICAL THERAPY | Age: 56
Setting detail: THERAPIES SERIES
Discharge: HOME | End: 2021-06-08
Payer: COMMERCIAL

## 2021-06-08 PROCEDURE — 97110 THERAPEUTIC EXERCISES: CPT

## 2021-06-08 PROCEDURE — 97140 MANUAL THERAPY 1/> REGIONS: CPT

## 2021-06-08 NOTE — FLOWSHEET NOTE
[address] [facility] [address] [address]  Phone: [phone]   Fax:     [phone]    Physical Therapy Treatment Note/ Progress Report:     Date:  2021    Patient Name:  Bre Rincon    :  1965  MRN: [id number]    Pertinent Medical History:     Medical/Treatment Diagnosis Information:  Diagnosis: Avascular necrosis of left femur ([address] (M87.052  Treatment Diagnosis: Left hip weakness limiting tolerance to walking, standing and adl's    Insurance/Certification information:  PT Insurance Information: [employer]  Physician Information:  Referring Practitioner: Dr Sara Gardiner of care signed (Y/N): routed 21    Date of Patient follow up with Physician:      Progress Report: []  Yes  [x]  No     Date Range for reporting period:  Beginnin2021  Ending:      Progress report due (10 Rx/or 30 days whichever is less): 07     Recertification due (POC duration/ or 90 days whichever is less):     Visit # POC/Insurance Allowable Auth Needed   5 BOMN []Yes   []No     Latex Allergy:  [x]NO      []YES  Preferred Language for Healthcare:   [x]English       []Other:    Functional Scale:      Date assessed: at eval  Test:LEFS post eval 21 =39   Score:    Pain level:  1/10     History of Injury: DOS 21 for a L JENNIFER anterior approach     SUBJECTIVE:    21 Pt reports decreased hip pain now compared to prior to surgery. Denies nay numbness at BLE's   21  20 min late     Pt reports no pain just stiffness. 6/3/21 Pt reports minimal discomfort/ stiffness this am.   21 25 min late    Pt reports no pain just a little ache in her hip.      OBJECTIVE: Gait pt walks with a rolling walker , balance with walker is good    Test measurements:    Functional testing Prehab        Date    Re eval   post op Date  4 week f/u   Date    8 week f/u  Date    D/C  Date           TUG        30 second sit to stand        6 minute walk                Balance        Narrow DARIN        Semi tandem        Tandem         SLS                Knee AROM        Knee Extension MMT R/L L=  R= L=37  R=50 L=63#     Hip aBduction MMT R/L L=  R= L=3#  R na L=20#     LEFS           RESTRICTIONS/PRECAUTIONS:     Exercises/Interventions:     Therapeutic Ex (67794)   Min: Reps/Resistance Notes/CUES   Nu step L4 x 5 min     Leg press Bilat 75# 20 x 2         incline    Mini squats  Cues for equal WB'ing L and R    Standing alt hip abd. Mat ex     ppt    Bridge partial    clams    LAQ    Hams curls    SL'ing hip abd         Manual Intervention (78082)  Min:     Gentle DTM Left hip, thigh and delia surgical scar     Gentle PROM Hip flexion to 90, hip IR and ER (end range avoided)    Gentle stretch To left add, and Hams          NMR re-education (73644)  Min:     sls modified 10\" alt 3 min Opp. TTWB'ing   Weight shifting          Airex balance      wobble ADD    Step ups                               Therapeutic Activity (05179)  Min:          Gait Training (74985)  Min:       5/25/21  The patient was educated on and practiced gait with a spc  on a level surface. They demonstrated proper technique, good safety awareness and good posture following the instruction. Balance was observed to be good. All patient questions regarding gait were answered. The patient was educated on stairs using a spc and a single railing  one step at a time for both ascending and descending stairs. they were instruction to lead up the strong RLE and lead down steps with the weaker ( LLE). \"Up with the good and down with the bad\" was used as a quip to help the patient remember the sequence. They demonstrated proper technique and verbalized understanding to stair climbing instruction. All patient questions regarding stairs were answered.  5/25/21 Advised pt to obtain an adjustable spc for use in home at this time, She will continue to use her rolling walker out of house   She verbalized understanding   Gait  6/1/21 Advised pt to walk without spc in home Pt verbalized understanding. Modalities  Min:            Other Therapeutic Activities: Patient was thoroughly educated on this date regarding prehabilitation goals, importance of PT sessions in improving overall ROM, strength and stability prior to surgery, and how prehabilitation will facilitate improved post-operative outcomes. The patient was educated on and instructed in HEP as listed. The patient was given a detailed handout for exercises to initiate in the hospital post-operatively as well as at home. The discharge plan from the hospital was reviewed with the patient; specifically, to reduce length of hospital stay and to minimize time before reinitiating outpatient physical therapy after surgery. Education regarding early mobility post-operatively in the hospital and emphasis on working with both physical therapy and nursing staff to achieve ambulation goal was provided. The patient was highly encouraged to attend joint class in hospital prior to surgery for further instructions on pre and post-surgical care. Also, the patient was educated on precautions after hip replacement to avoid, specifically, hip extension and repetitive hip flexion. It is in my medical opinion that this patient is clear from all physical barriers prior to consideration for surgery, activity modifications prior to and post operatively have been discussed with this patient as well as discharge planning and is cleared for surgery from physical therapy perspective. Home Exercise Program:   Access Code: [email or web address]: [email or web address]: 06/08/2021Prepared by: Raciel Perez - 1 x daily - 3 x weekly - 2 sets - 15 reps - 3 hold   Sidelying Hip Abduction - 1 x daily - 3 x weekly - 2 sets - 15 reps - 3 hold   Bridge - 1 x daily - 3 x weekly - 1-2 sets - 15 reps - 5-10 hold     Patient instructed in the above exercises for HEP. Patient verbalized/demonstrated understanding and was issued written handout. Also reviewed MD precaution following surgery with patient. Therapeutic Exercise and NMR EXR  [] (68029) Provided verbal/tactile cueing for activities related to strengthening, flexibility, endurance, ROM for improvements in LE, proximal hip, and core control with self care, mobility, lifting, ambulation.  [] (31055) Provided verbal/tactile cueing for activities related to improving balance, coordination, kinesthetic sense, posture, motor skill, proprioception  to assist with LE, proximal hip, and core control in self care, mobility, lifting, ambulation and eccentric single leg control.  [address] and Therapeutic Activities:    [] (79452 or 59694) Provided verbal/tactile cueing for activities related to improving balance, coordination, kinesthetic sense, posture, motor skill, proprioception and motor activation to allow for proper function of core, proximal hip and LE with self care and ADLs and functional mobility.   [] (10146) Gait Re-education- Provided training and instruction to the patient for proper LE, core and proximal hip recruitment and positioning and eccentric body weight control with ambulation re-education including up and down stairs     Gait Training:  [] (38399) Provided training and instruction to the patient for proper postural muscle recruitment and positioning with ambulation re-education     Home Exercise Program:    [x] (66681) Reviewed/Progressed HEP activities related to strengthening, flexibility, endurance, ROM of core, proximal hip and LE for functional self-care, mobility, lifting and ambulation/stair navigation   [] (50025)Reviewed/Progressed HEP activities related to improving balance, coordination, kinesthetic sense, posture, motor skill, proprioception of core, proximal hip and LE for self care, mobility, lifting, and ambulation/stair navigation Manual Treatments:  PROM / STM / Oscillations-Mobs:  G-I, II, III, IV (PA's, Inf., Post.)  [] (41363) Provided manual therapy to mobilize LE, proximal hip and/or LS spine soft tissue/joints for the purpose of modulating pain, promoting relaxation,  increasing ROM, reducing/eliminating soft tissue swelling/inflammation/restriction, improving soft tissue extensibility and allowing for proper ROM for normal function with self care, mobility, lifting and ambulation. Charges:  Timed Code Treatment Minutes: 35   Total Treatment Minutes: 35    6/3/21 Pt needed to leave early for work  [] EVAL (LOW) 455 1011 (typically 20 minutes face-to-face)  [] EVAL (MOD) 22607 (typically 30 minutes face-to-face)  [] EVAL (HIGH) 10495 (typically 45 minutes face-to-face)  [] RE-EVAL     [x] DX(85702) x    [] Dry needle 1 or 2 Muscles (36549)  [x] NMR (12255) x     [] Dry needle 3+ Muscles (57258)  [] Manual (28028) x     [] Ultrasound (08958) x  [] TA (12736) x     [] Mech Traction (21584)  [] ES(attended) (28375)     [] ES (un) (03070):   [] Vasopump (56709) [] Ionto (45716)   [] Gait (67378)  [] Other:    GOALS:  Patient stated goal: better motion and relieve pain  [x]? Progressing: []? Met: []? Not Met: []? Adjusted  1. Pt will walk at a community level without a device   [x]? Progressing: []? Met: []? Not Met: []? Adjusted  2. Decrease c/o pain 0-1/10 with adl's   [x]? Progressing: []? Met: []? Not Met: []? Adjusted  3. Improver left hip and LE strength to 4/5 or better to facilitate patients goals   [x]? Progressing: []? Met: []? Not Met: []? Adjusted  ASSESSMENT:  See eval    Treatment/Activity Tolerance:  [x] Patient tolerated treatment well [] Patient limited by fatique  [] Patient limited by pain  [] Patient limited by other medical complications  [] Other:     Overall Progression Towards Functional goals/ Treatment Progress Update:  [x] Patient is progressing as expected towards functional goals listed.     [] Progression is slowed due to complexities/Impairments listed. [] Progression has been slowed due to co-morbidities. [] Plan just implemented, too soon to assess goals progression <30days   [] Goals require adjustment due to lack of progress  [] Patient is not progressing as expected and requires additional follow up with physician  [] Other    Prognosis for POC: [x] Good [] Fair  [] Poor    Patient requires continued skilled intervention: [] Yes  [x] No        PLAN:  ADD to HEP, increase NMR activities      Progress gait to spc at community level as tolerated , progress hip and LE strength ,balance and proprioception     Patient is a good candidate for THR surgery and would benefit from outpatient rehab following surgery. [x] Continue per plan of care [] Alter current plan (see comments)  [] Plan of care initiated [] Hold pending MD visit [] Discharge    Electronically signed by: Fiona Sears PT    Note: If patient does not return for scheduled/recommended follow up visits, this note will serve as a discharge from care along with the most recent update on progress.

## 2021-06-10 ENCOUNTER — HOSPITAL ENCOUNTER (OUTPATIENT)
Dept: PHYSICAL THERAPY | Age: 56
Setting detail: THERAPIES SERIES
Discharge: HOME | End: 2021-06-10
Payer: COMMERCIAL

## 2021-06-10 PROCEDURE — 97112 NEUROMUSCULAR REEDUCATION: CPT

## 2021-06-10 PROCEDURE — 97110 THERAPEUTIC EXERCISES: CPT

## 2021-06-10 NOTE — FLOWSHEET NOTE
[address] [facility] [address]  Phone: [phone]   Fax:     [phone]    Physical Therapy Treatment Note/ Progress Report:     Date:  6/10/2021    Patient Name:  Nehemias Cabrera    :  1965  MRN: [id number]    Pertinent Medical History:     Medical/Treatment Diagnosis Information:  Diagnosis: Avascular necrosis of left femur ([address] (M87.052  Treatment Diagnosis: Left hip weakness limiting tolerance to walking, standing and adl's    Insurance/Certification information:  PT Insurance Information: [facility]  Physician Information:  Referring Practitioner: Dr Jenene Hodgkin of care signed (Y/N): routed 21    Date of Patient follow up with Physician:      Progress Report: []  Yes  [x]  No     Date Range for reporting period:  Beginnin/10/2021  Ending:      Progress report due (10 Rx/or 30 days whichever is less):      Recertification due (POC duration/ or 90 days whichever is less):     Visit # POC/Insurance Allowable Auth Needed   6 BOMN []Yes   []No     Latex Allergy:  [x]NO      []YES  Preferred Language for Healthcare:   [x]English       []Other:    Functional Scale:      Date assessed: at eval  Test:LEFS post eval 21 =39   Score:    Pain level:  0/10     History of Injury: DOS 21 for a L JENNIFER anterior approach     SUBJECTIVE:    21 Pt reports decreased hip pain now compared to prior to surgery. Denies nay numbness at BLE's   21  20 min late     Pt reports no pain just stiffness. 6/3/21 Pt reports minimal discomfort/ stiffness this am.   21 25 min late    Pt reports no pain just a little ache in her hip. 6/10/21  15 min late   Pt reports no pain today in her hip.       OBJECTIVE: Gait pt walks with a rolling walker , balance with walker is good    Test measurements:    Functional testing Prehab        Date    Re eval   post op Date  4 week f/u   Date    8 week f/u  Date    D/C  Date           TUG        30 second sit to stand        6 minute walk                Balance        Narrow DARIN        Semi tandem        Tandem         SLS                Knee AROM        Knee Extension MMT R/L L=  R= L=37  R=50 L=63#     Hip aBduction MMT R/L L=  R= L=3#  R na L=20#     LEFS           RESTRICTIONS/PRECAUTIONS:     Exercises/Interventions:     Therapeutic Ex (09719)   Min: Reps/Resistance Notes/CUES   Nu step L4 x 5 min     Leg press Bilat 80# 20 x 2         incline 2 min    Mini squats  5\" 15 x Cues for equal WB'ing L and R    Standing alt hip abd. Mat ex     ppt    Bridge partial    clams    LAQ    Hams curls    SL'ing hip abd         Manual Intervention (76017)  Min:     Gentle DTM    Gentle PROM    Gentle stretch         NMR re-education (12838)  Min:     sls airex 10\" alt 3 min Opp. TTWB'ing   Weight shifting          Airex balance      wobble 3 min     Step ups  Fwd 6\" 15 x  Lat 4\" 15 x                              Therapeutic Activity (79310)  Min:          Gait Training (69695)  Min:       5/25/21  The patient was educated on and practiced gait with a spc  on a level surface. They demonstrated proper technique, good safety awareness and good posture following the instruction. Balance was observed to be good. All patient questions regarding gait were answered. The patient was educated on stairs using a spc and a single railing  one step at a time for both ascending and descending stairs. they were instruction to lead up the strong RLE and lead down steps with the weaker ( LLE). \"Up with the good and down with the bad\" was used as a quip to help the patient remember the sequence. They demonstrated proper technique and verbalized understanding to stair climbing instruction. All patient questions regarding stairs were answered.  5/25/21 Advised pt to obtain an adjustable spc for use in home at this time, She will continue to use her rolling walker out of house   She verbalized understanding   Gait  6/1/21 Advised pt to walk without spc in home Pt verbalized understanding. Modalities  Min:            Other Therapeutic Activities: Patient was thoroughly educated on this date regarding prehabilitation goals, importance of PT sessions in improving overall ROM, strength and stability prior to surgery, and how prehabilitation will facilitate improved post-operative outcomes. The patient was educated on and instructed in HEP as listed. The patient was given a detailed handout for exercises to initiate in the hospital post-operatively as well as at home. The discharge plan from the hospital was reviewed with the patient; specifically, to reduce length of hospital stay and to minimize time before reinitiating outpatient physical therapy after surgery. Education regarding early mobility post-operatively in the hospital and emphasis on working with both physical therapy and nursing staff to achieve ambulation goal was provided. The patient was highly encouraged to attend joint class in hospital prior to surgery for further instructions on pre and post-surgical care. Also, the patient was educated on precautions after hip replacement to avoid, specifically, hip extension and repetitive hip flexion. It is in my medical opinion that this patient is clear from all physical barriers prior to consideration for surgery, activity modifications prior to and post operatively have been discussed with this patient as well as discharge planning and is cleared for surgery from physical therapy perspective. Home Exercise Program:   Access Code: [email or web address]: [email or web address]/Date: 06/08/2021Prepared by: Ray Loo - 1 x daily - 3 x weekly - 2 sets - 15 reps - 3 hold   Sidelying Hip Abduction - 1 x daily - 3 x weekly - 2 sets - 15 reps - 3 hold   Bridge - 1 x daily - 3 x weekly - 1-2 sets - 15 reps - 5-10 hold     Patient instructed in the above exercises for HEP. Patient verbalized/demonstrated understanding and was issued written handout. Also reviewed MD precaution following surgery with patient. Therapeutic Exercise and NMR EXR  [] (85939) Provided verbal/tactile cueing for activities related to strengthening, flexibility, endurance, ROM for improvements in LE, proximal hip, and core control with self care, mobility, lifting, ambulation.  [] (26640) Provided verbal/tactile cueing for activities related to improving balance, coordination, kinesthetic sense, posture, motor skill, proprioception  to assist with LE, proximal hip, and core control in self care, mobility, lifting, ambulation and eccentric single leg control.  [address] and Therapeutic Activities:    [] (31650 or 52760) Provided verbal/tactile cueing for activities related to improving balance, coordination, kinesthetic sense, posture, motor skill, proprioception and motor activation to allow for proper function of core, proximal hip and LE with self care and ADLs and functional mobility.   [] (02981) Gait Re-education- Provided training and instruction to the patient for proper LE, core and proximal hip recruitment and positioning and eccentric body weight control with ambulation re-education including up and down stairs     Gait Training:  [] (15093) Provided training and instruction to the patient for proper postural muscle recruitment and positioning with ambulation re-education     Home Exercise Program:    [x] (17303) Reviewed/Progressed HEP activities related to strengthening, flexibility, endurance, ROM of core, proximal hip and LE for functional self-care, mobility, lifting and ambulation/stair navigation   [] (73394)Reviewed/Progressed HEP activities related to improving balance, coordination, kinesthetic sense, posture, motor skill, proprioception of core, proximal hip and LE for self care, mobility, lifting, and ambulation/stair navigation Manual Treatments:  PROM / STM / Oscillations-Mobs:  G-I, II, III, IV (PA's, Inf., Post.)  [] (58422) Provided manual therapy to mobilize LE, proximal hip and/or LS spine soft tissue/joints for the purpose of modulating pain, promoting relaxation,  increasing ROM, reducing/eliminating soft tissue swelling/inflammation/restriction, improving soft tissue extensibility and allowing for proper ROM for normal function with self care, mobility, lifting and ambulation. Charges:  Timed Code Treatment Minutes: 35   Total Treatment Minutes: 35    6/3/21 Pt needed to leave early for work  [] EVAL (LOW) 455 1011 (typically 20 minutes face-to-face)  [] EVAL (MOD) 45540 (typically 30 minutes face-to-face)  [] EVAL (HIGH) 07803 (typically 45 minutes face-to-face)  [] RE-EVAL     [x] IP(27002) x    [] Dry needle 1 or 2 Muscles (40253)  [x] NMR (07162) x     [] Dry needle 3+ Muscles (69382)  [] Manual (14663) x     [] Ultrasound (59573) x  [] TA (45540) x     [] Mech Traction (92171)  [] ES(attended) (11061)     [] ES (un) (93848):   [] Vasopump (13809) [] Ionto (96446)   [] Gait (23207)  [] Other:    GOALS:  Patient stated goal: better motion and relieve pain  [x]? Progressing: []? Met: []? Not Met: []? Adjusted  1. Pt will walk at a community level without a device   [x]? Progressing: []? Met: []? Not Met: []? Adjusted  2. Decrease c/o pain 0-1/10 with adl's   [x]? Progressing: []? Met: []? Not Met: []? Adjusted  3. Improver left hip and LE strength to 4/5 or better to facilitate patients goals   [x]? Progressing: []? Met: []? Not Met: []? Adjusted  ASSESSMENT:  See eval    Treatment/Activity Tolerance:  [x] Patient tolerated treatment well [] Patient limited by fatique  [] Patient limited by pain  [] Patient limited by other medical complications  [] Other:     Overall Progression Towards Functional goals/ Treatment Progress Update:  [x] Patient is progressing as expected towards functional goals listed.     [] Progression is slowed due to complexities/Impairments listed. [] Progression has been slowed due to co-morbidities. [] Plan just implemented, too soon to assess goals progression <30days   [] Goals require adjustment due to lack of progress  [] Patient is not progressing as expected and requires additional follow up with physician  [] Other    Prognosis for POC: [x] Good [] Fair  [] Poor    Patient requires continued skilled intervention: [] Yes  [x] No        PLAN:  ADD to HEP, increase NMR activities      Progress gait to spc at community level as tolerated , progress hip and LE strength ,balance and proprioception     Patient is a good candidate for THR surgery and would benefit from outpatient rehab following surgery. [x] Continue per plan of care [] Alter current plan (see comments)  [] Plan of care initiated [] Hold pending MD visit [] Discharge    Electronically signed by: Lani Singleton PT    Note: If patient does not return for scheduled/recommended follow up visits, this note will serve as a discharge from care along with the most recent update on progress.

## 2021-06-15 ENCOUNTER — HOSPITAL ENCOUNTER (OUTPATIENT)
Dept: PHYSICAL THERAPY | Age: 56
Setting detail: THERAPIES SERIES
Discharge: HOME | End: 2021-06-15
Payer: COMMERCIAL

## 2021-06-15 PROCEDURE — 97110 THERAPEUTIC EXERCISES: CPT

## 2021-06-15 PROCEDURE — 97112 NEUROMUSCULAR REEDUCATION: CPT

## 2021-06-15 PROCEDURE — 97140 MANUAL THERAPY 1/> REGIONS: CPT

## 2021-06-15 NOTE — FLOWSHEET NOTE
[facility] [address]  Phone: [phone]   Fax:     [phone]    Physical Therapy Treatment Note/ Progress Report:     Date:  6/15/2021    Patient Name:  Jim Gan    :  1965  MRN: [id number]    Pertinent Medical History:     Medical/Treatment Diagnosis Information:  Diagnosis: Avascular necrosis of left femur ([address] (M87.052  Treatment Diagnosis: Left hip weakness limiting tolerance to walking, standing and adl's    Insurance/Certification information:  PT Insurance Information: Cyrus Botello  Physician Information:  Referring Practitioner: Dr Kali eMeks of care signed (Y/N): routed 21    Date of Patient follow up with Physician:      Progress Report: []  Yes  [x]  No     Date Range for reporting period:  Beginnin/15/2021  Ending:      Progress report due (10 Rx/or 30 days whichever is less):      Recertification due (POC duration/ or 90 days whichever is less):     Visit # POC/Insurance Allowable Auth Needed   7 BOMN []Yes   []No     Latex Allergy:  [x]NO      []YES  Preferred Language for Healthcare:   [x]English       []Other:    Functional Scale:      Date assessed: at eval  Test:LEFS post eval 21 =39   Score:    Pain level:  0/10     History of Injury: DOS 21 for a L JENNIFER anterior approach     SUBJECTIVE:    21 Pt reports decreased hip pain now compared to prior to surgery. Denies nay numbness at BLE's   21  20 min late     Pt reports no pain just stiffness. 6/3/21 Pt reports minimal discomfort/ stiffness this am.   21 25 min late    Pt reports no pain just a little ache in her hip. 6/10/21  15 min late   Pt reports no pain today in her hip. 6/15/21: 10 min late. Pt reports no pain, just a little stiffness in her hip. OBJECTIVE: Patient is ambulating with SPC with good technique.  She states she does not use any device within her home.  Test measurements:    tFunctional testing Prehab        Date    Re eval   post op Date  4 week f/u   Date    8 week f/u  Date    D/C  Date           TUG        30 second sit to stand        6 minute walk                Balance        Narrow DARIN        Semi tandem        Tandem         SLS                Knee AROM        Knee Extension MMT R/L L=  R= L=37  R=50 L=63#     Hip aBduction MMT R/L L=  R= L=3#  R na L=20#     LEFS           RESTRICTIONS/PRECAUTIONS:     Exercises/Interventions:     Therapeutic Ex (14774)   Min: Reps/Resistance Notes/CUES   Nu step L4 x 5 min     Leg press Bilat 80# 20 x 2         incline 2 min    Mini squats  5\" 15 x Cues for equal WB'ing L and R    Standing alt hip abd. Mat ex     ppt    Bridge partial    clams    LAQ 5# 30 x    Hams curls 6K 30 x     SL'ing hip abd         Manual Intervention (31948)  Min:     Gentle DTM Left hip, thigh and delia surgical scar     Gentle PROM Hip flexion to 90, hip IR and ER (end range avoided)    Gentle stretch To left add, and Hams          NMR re-education (15679)  Min:     sls airex 10\" alt 3 min Opp. TTWB'ing   Weight shifting          Airex balance      wobble 3 min     Step ups  Fwd 6\" 15 x  Lat 4\" 15 x                              Therapeutic Activity (99941)  Min:          Gait Training (77867)  Min:       5/25/21  The patient was educated on and practiced gait with a spc  on a level surface. They demonstrated proper technique, good safety awareness and good posture following the instruction. Balance was observed to be good. All patient questions regarding gait were answered. The patient was educated on stairs using a spc and a single railing  one step at a time for both ascending and descending stairs. they were instruction to lead up the strong RLE and lead down steps with the weaker ( LLE). \"Up with the good and down with the bad\" was used as a quip to help the patient remember the sequence.  They by: Sydni Francis BloomExercises   Clamshell - 1 x daily - 3 x weekly - 2 sets - 15 reps - 3 hold   Sidelying Hip Abduction - 1 x daily - 3 x weekly - 2 sets - 15 reps - 3 hold   Bridge - 1 x daily - 3 x weekly - 1-2 sets - 15 reps - 5-10 hold     Patient instructed in the above exercises for HEP. Patient verbalized/demonstrated understanding and was issued written handout. Also reviewed MD precaution following surgery with patient. Therapeutic Exercise and NMR EXR  [x] (92540) Provided verbal/tactile cueing for activities related to strengthening, flexibility, endurance, ROM for improvements in LE, proximal hip, and core control with self care, mobility, lifting, ambulation.  [] (57839) Provided verbal/tactile cueing for activities related to improving balance, coordination, kinesthetic sense, posture, motor skill, proprioception  to assist with LE, proximal hip, and core control in self care, mobility, lifting, ambulation and eccentric single leg control.  [address] and Therapeutic Activities:    [] (68827 or 98640) Provided verbal/tactile cueing for activities related to improving balance, coordination, kinesthetic sense, posture, motor skill, proprioception and motor activation to allow for proper function of core, proximal hip and LE with self care and ADLs and functional mobility.   [] (40445) Gait Re-education- Provided training and instruction to the patient for proper LE, core and proximal hip recruitment and positioning and eccentric body weight control with ambulation re-education including up and down stairs     Gait Training:  [] (82017) Provided training and instruction to the patient for proper postural muscle recruitment and positioning with ambulation re-education     Home Exercise Program:    [x] (46854) Reviewed/Progressed HEP activities related to strengthening, flexibility, endurance, ROM of core, proximal hip and LE for functional self-care, mobility, lifting and ambulation/stair navigation limited by pain  [] Patient limited by other medical complications  [] Other:     Overall Progression Towards Functional goals/ Treatment Progress Update:  [x] Patient is progressing as expected towards functional goals listed. [] Progression is slowed due to complexities/Impairments listed. [] Progression has been slowed due to co-morbidities. [] Plan just implemented, too soon to assess goals progression <30days   [] Goals require adjustment due to lack of progress  [] Patient is not progressing as expected and requires additional follow up with physician  [] Other    Prognosis for POC: [x] Good [] Fair  [] Poor    Patient requires continued skilled intervention: [] Yes  [x] No        PLAN:  ADD to HEP, increase NMR activities      morales. [x] Continue per plan of care [] Alter current plan (see comments)  [] Plan of care initiated [] Hold pending MD visit [] Discharge    Electronically signed by: Beatrice Whittaker PT    Note: If patient does not return for scheduled/recommended follow up visits, this note will serve as a discharge from care along with the most recent update on progress.

## 2021-06-17 ENCOUNTER — HOSPITAL ENCOUNTER (OUTPATIENT)
Dept: PHYSICAL THERAPY | Age: 56
Setting detail: THERAPIES SERIES
Discharge: HOME | End: 2021-06-17
Payer: COMMERCIAL

## 2021-06-17 PROCEDURE — 97140 MANUAL THERAPY 1/> REGIONS: CPT

## 2021-06-17 PROCEDURE — 97110 THERAPEUTIC EXERCISES: CPT

## 2021-06-17 PROCEDURE — 97530 THERAPEUTIC ACTIVITIES: CPT

## 2021-06-17 NOTE — FLOWSHEET NOTE
[address] [facility] [address] [place] [address]  Phone: [phone]   Fax:     [phone]    Physical Therapy Treatment Note/ Progress Report:     Date:  2021    Patient Name:  Kendell Rubin    :  1965  MRN: [id number]    Pertinent Medical History:     Medical/Treatment Diagnosis Information:  Diagnosis: Avascular necrosis of left femur ([address] (M87.052  Treatment Diagnosis: Left hip weakness limiting tolerance to walking, standing and adl's    Insurance/Certification information:  PT Insurance Information: Xiomaar Vidal  Physician Information:  Referring Practitioner: Dr Sivan Velarde of care signed (Y/N): routed 21    Date of Patient follow up with Physician:      Progress Report: []  Yes  [x]  No     Date Range for reporting period:  Beginnin2021  Ending:      Progress report due (10 Rx/or 30 days whichever is less): 94     Recertification due (POC duration/ or 90 days whichever is less):     Visit # POC/Insurance Allowable Auth Needed   8 BOMN []Yes   []No     Latex Allergy:  [x]NO      []YES  Preferred Language for Healthcare:   [x]English       []Other:    Functional Scale:      Date assessed: at eval  Test:LEFS post eval 21 =39   Score:    Pain level:  0/10     History of Injury: DOS 21 for a L JENNIFER anterior approach     SUBJECTIVE:    21 Pt reports decreased hip pain now compared to prior to surgery. Denies nay numbness at BLE's   21  20 min late     Pt reports no pain just stiffness. 6/3/21 Pt reports minimal discomfort/ stiffness this am.   21 25 min late    Pt reports no pain just a little ache in her hip. 6/10/21  15 min late   Pt reports no pain today in her hip. 6/15/21: 10 min late. Pt reports no pain, just a little stiffness in her hip. OBJECTIVE: Patient is ambulating with SPC with good technique.  She states she does not use any device within her home.  Test measurements:    tFunctional testing Prehab        Date    Re eval   post op Date  4 week f/u   Date    8 week f/u  Date    D/C  Date           TUG        30 second sit to stand        6 minute walk                Balance        Narrow DARIN        Semi tandem        Tandem         SLS                Knee AROM        Knee Extension MMT R/L L=  R= L=37  R=50 L=63#     Hip aBduction MMT R/L L=  R= L=3#  R na L=20#     LEFS           RESTRICTIONS/PRECAUTIONS:     Exercises/Interventions:     Therapeutic Ex (91305)   Min: Reps/Resistance Notes/CUES   Nu step L4 x 5 min     Leg press Bilat 80# 20 x 2         incline 2 min    Mini squats  10\" 15 x Cues for equal WB'ing L and R    Standing alt hip abd. Mat ex     ppt    Bridge partial    clams 10 x 3   LAQ 5# 20x, 7# 20x    Hams curls 4K 30 x     SL'ing hip abd 3\" 15 xWith assist        Manual Intervention (10896)  Min:     Gentle DTM Left hip, thigh and delia surgical scar     Gentle PROM Hip flexion to 90, hip IR and ER (end range avoided)    Gentle stretch To left add, and Hams          NMR re-education (58205)  Min:     sls airex 10\" alt 3 min Opp. TTWB'ing   Weight shifting          Airex balance      wobble 3 min     Step ups  Fwd 6\" 15 x  Lat 4\" 15 x                              Therapeutic Activity (95022)  Min:          Gait Training (13860)  Min:       5/25/21  The patient was educated on and practiced gait with a spc  on a level surface. They demonstrated proper technique, good safety awareness and good posture following the instruction. Balance was observed to be good. All patient questions regarding gait were answered. The patient was educated on stairs using a spc and a single railing  one step at a time for both ascending and descending stairs. they were instruction to lead up the strong RLE and lead down steps with the weaker ( LLE).    \"Up with the good and down with the bad\" was used as a quip to help the patient remember the sequence. They demonstrated proper technique and verbalized understanding to stair climbing instruction. All patient questions regarding stairs were answered. 5/25/21 Advised pt to obtain an adjustable spc for use in home at this time, She will continue to use her rolling walker out of house   She verbalized understanding   Gait  6/1/21 Advised pt to walk without spc in home Pt verbalized understanding. Modalities  Min:            Other Therapeutic Activities: Patient was thoroughly educated on this date regarding prehabilitation goals, importance of PT sessions in improving overall ROM, strength and stability prior to surgery, and how prehabilitation will facilitate improved post-operative outcomes. The patient was educated on and instructed in HEP as listed. The patient was given a detailed handout for exercises to initiate in the hospital post-operatively as well as at home. The discharge plan from the hospital was reviewed with the patient; specifically, to reduce length of hospital stay and to minimize time before reinitiating outpatient physical therapy after surgery. Education regarding early mobility post-operatively in the hospital and emphasis on working with both physical therapy and nursing staff to achieve ambulation goal was provided. The patient was highly encouraged to attend joint class in hospital prior to surgery for further instructions on pre and post-surgical care. Also, the patient was educated on precautions after hip replacement to avoid, specifically, hip extension and repetitive hip flexion. It is in my medical opinion that this patient is clear from all physical barriers prior to consideration for surgery, activity modifications prior to and post operatively have been discussed with this patient as well as discharge planning and is cleared for surgery from physical therapy perspective.     Home Exercise Program:   Access Code: [email or web address]:

## 2021-06-22 ENCOUNTER — HOSPITAL ENCOUNTER (OUTPATIENT)
Dept: PHYSICAL THERAPY | Age: 56
Setting detail: THERAPIES SERIES
Discharge: HOME | End: 2021-06-22
Payer: COMMERCIAL

## 2021-06-22 NOTE — FLOWSHEET NOTE
[facility]  [place] [address]  Phone: [phone]   Fax:     [phone]    Physical Therapy  Cancellation/No-show Note  Patient Name:  Arnaud Moreno  :  1965   Date:  2021  Cancelled visits to date: 1  No-shows to date: 0    Patient status for today's appointment patient:  [x]  Cancelled  []  Rescheduled appointment  []  No-show     Reason given by patient:  []  Patient ill  []  Conflicting appointment  []  No transportation    []  Conflict with work  []  No reason given  [x]  Other:     Comments:   Death of father    Phone call information:   []  Phone call made today to patient at _ time at number provided:      []  Patient answered, conversation as follows:    []  Patient did not answer, message left as follows:  [x]  Phone call not made today    Electronically signed by:  Lisa Chang PT

## 2021-06-24 ENCOUNTER — APPOINTMENT (OUTPATIENT)
Dept: PHYSICAL THERAPY | Age: 56
End: 2021-06-24
Payer: COMMERCIAL

## 2021-06-29 ENCOUNTER — HOSPITAL ENCOUNTER (OUTPATIENT)
Dept: PHYSICAL THERAPY | Age: 56
Setting detail: THERAPIES SERIES
Discharge: HOME | End: 2021-06-29
Payer: COMMERCIAL

## 2021-06-29 DIAGNOSIS — E78.00 HYPERCHOLESTEROLEMIA: ICD-10-CM

## 2021-06-29 PROCEDURE — 97110 THERAPEUTIC EXERCISES: CPT

## 2021-06-29 PROCEDURE — 97140 MANUAL THERAPY 1/> REGIONS: CPT

## 2021-06-29 PROCEDURE — 97112 NEUROMUSCULAR REEDUCATION: CPT

## 2021-06-29 RX ORDER — ATORVASTATIN CALCIUM 10 MG/1
TABLET, FILM COATED ORAL
Qty: 90 TABLET | Refills: 0 | Status: SHIPPED | OUTPATIENT
Start: 2021-06-29 | End: 2021-09-28

## 2021-06-29 NOTE — FLOWSHEET NOTE
Physical Therapy Re-Certification Plan of Care/MD UPDATE      Dear   Dr Maria Eugenia Starkey,    We had the pleasure of treating the following patient for physical therapy services at [facility]. A summary of our findings can be found in the updated assessment below. This includes our plan of care. If you have any questions or concerns regarding these findings, please do not hesitate to contact me at the office phone number checked above. Thank you for the referral.     Physician Signature:________________________________Date:__________________  By signing above (or electronic signature), therapists plan is approved by physician    Date Range Of Visits: 21 to 21  Total Visits to Date: 9  Overall Response to Treatment:   [x]Patient is responding well to treatment and improvement is noted with regards  to goals   []Patient should continue to improve in reasonable time if they continue HEP   []Patient has plateaued and is no longer responding to skilled PT intervention    []Patient is getting worse and would benefit from return to referring MD   []Patient unable to adhere to initial POC   []Other:       Recommendation:    [x]Continue PT 1-2x / wk for 2-4 weeks.     []Hold PT, pending MD visit              Physical Therapy Treatment Note/ Progress Report:     Date:  2021    Patient Name:  Yi Elias    :  1965  MRN: [id number]    Pertinent Medical History:     Medical/Treatment Diagnosis Information:  Diagnosis: Avascular necrosis of left femur ([address] (M87.052  Treatment Diagnosis: Left hip weakness limiting tolerance to walking, standing and adl's    Insurance/Certification information:  PT Insurance Information: Yuki Perez  Physician Information:  Referring Practitioner: Dr Dallin Giraldo of care signed (Y/N): routed 21    Date of Patient follow up with Physician:      Progress Report: []  Yes  [x]  No     Date Range for reporting period:  Beginnin2021  Ending:      Progress report due (10 Rx/or 30 days whichever is less):      Recertification due (POC duration/ or 90 days whichever is less):     Visit # POC/Insurance Allowable Auth Needed   9 BOMN []Yes   []No     Latex Allergy:  [x]NO      []YES  Preferred Language for Healthcare:   [x]English       []Other:    Functional Scale:      Date assessed: at eval  Test:LEFS post eval 21 =39 ,   21 = 55  Score:     Pain level:  0/10     History of Injury: DOS 21 for a L JENNIFER anterior approach     SUBJECTIVE:    21 Pt reports decreased hip pain now compared to prior to surgery. Denies nay numbness at BLE's   21  20 min late     Pt reports no pain just stiffness. 6/3/21 Pt reports minimal discomfort/ stiffness this am.   21 25 min late    Pt reports no pain just a little ache in her hip. 6/10/21  15 min late   Pt reports no pain today in her hip. 6/15/21: 10 min late. Pt reports no pain, just a little stiffness in her hip.  21/ 10 min  Late  Pt reports her hip has been feeling good. Only feels some pulling (not pain) with taking stairs normally. OBJECTIVE: Patient is ambulating with SPC with good technique. She states she does not use any device within her home.      Test measurements:    tFunctional testing Prehab        Date    Re eval   post op Date  4 week f/u   Date    8 week f/u  Date    D/C  Date           TUG        30 second sit to stand        6 minute walk                Balance        Narrow DARIN        Semi tandem        Tandem         SLS                Knee AROM        Knee Extension MMT R/L L=  R= L=37  R=50 L=63#     Hip aBduction MMT R/L L=  R= L=3#  R na L=20#     LEFS         21 MMT left hip abd 4/5 flexion 3+/5  RESTRICTIONS/PRECAUTIONS:     Exercises/Interventions:     Therapeutic Ex (20691)   Min: Reps/Resistance Notes/CUES   Nu step L4 x 5 min     Leg press          incline 2 min    Mini squats  10\" 15 x Cues for equal WB'ing L and R    Standing alt hip abd. Mat ex     ppt    Bridge partial    clams    LAQ    Hams curls    SL'ing hip abd With assist   Supine alt hip march 15 x    Manual Intervention ([phone])  Min:     Gentle DTM Left hip, thigh and delia surgical scar     Gentle PROM Hip flexion to 90, hip IR and ER (end range avoided)    Gentle stretch To left add, and Hams          NMR re-education (44834)  Min:     sls airex 10\" alt 3 min Opp. TTWB'ing   Weight shifting          Airex balance      wobble 3 min     Step ups  Fwd 6\" 15 x  Lat 6\" 15 x     STEP HIP HIKE  15 x                         Therapeutic Activity (21841)  Min:     Stair training  6/29/21 Ascend/ descend stairs using railings and taking reciprocal steps , pt demonstrated good tolerance, balance and stability  5 sets on practice steps                         Gait Training (67037)  Min:       5/25/21  The patient was educated on and practiced gait with a spc  on a level surface. They demonstrated proper technique, good safety awareness and good posture following the instruction. Balance was observed to be good. All patient questions regarding gait were answered. The patient was educated on stairs using a spc and a single railing  one step at a time for both ascending and descending stairs. they were instruction to lead up the strong RLE and lead down steps with the weaker ( LLE). \"Up with the good and down with the bad\" was used as a quip to help the patient remember the sequence. They demonstrated proper technique and verbalized understanding to stair climbing instruction. All patient questions regarding stairs were answered. 5/25/21 Advised pt to obtain an adjustable spc for use in home at this time, She will continue to use her rolling walker out of house   She verbalized understanding   Gait  6/1/21 Advised pt to walk without spc in home Pt verbalized understanding.               Modalities  Min:            Other Therapeutic Activities: Patient was thoroughly educated on this date regarding prehabilitation goals, importance of PT sessions in improving overall ROM, strength and stability prior to surgery, and how prehabilitation will facilitate improved post-operative outcomes. The patient was educated on and instructed in HEP as listed. The patient was given a detailed handout for exercises to initiate in the hospital post-operatively as well as at home. The discharge plan from the hospital was reviewed with the patient; specifically, to reduce length of hospital stay and to minimize time before reinitiating outpatient physical therapy after surgery. Education regarding early mobility post-operatively in the hospital and emphasis on working with both physical therapy and nursing staff to achieve ambulation goal was provided. The patient was highly encouraged to attend joint class in hospital prior to surgery for further instructions on pre and post-surgical care. Also, the patient was educated on precautions after hip replacement to avoid, specifically, hip extension and repetitive hip flexion. It is in my medical opinion that this patient is clear from all physical barriers prior to consideration for surgery, activity modifications prior to and post operatively have been discussed with this patient as well as discharge planning and is cleared for surgery from physical therapy perspective. Home Exercise Program:   Access Code: [email or web address]: [employer]. com/Date: 06/29/2021Prepared by: Cole Rojas - 1 x daily - 3 x weekly - 2 sets - 15 reps - 3 hold   Sidelying Hip Abduction - 1 x daily - 3 x weekly - 2 sets - 15 reps - 3 hold   Bridge - 1 x daily - 3 x weekly - 1-2 sets - 15 reps - 5-10 hold   Supine March with Posterior Pelvic Tilt - 1 x daily - 3 x weekly - 2 sets - 15 reps - 2-3 hold    Access Code: [email or web address]: [employer] com/Date: 06/29/2021Prepared by: Bucky Gloria - 1 x daily - 3 x weekly - 1-2 sets - 15 reps - 5 hold   Alternating Single Leg Balance - Foot Behind - 1 x daily - 3 x weekly - 1-2 sets - 10-15 reps - 10 hold   Side Stepping with Resistance at Thighs - 1 x daily - 3 x weekly - 4 sets - 10 reps   Forward Step Up - 1 x daily - 3 x weekly - 1-2 sets - 15 reps   Lateral Step Up - 1 x daily - 3 x weekly - 1-2 sets - 15 reps   Hip Hiking on Step - 1 x daily - 3 x weekly - 1-2 sets - 10 reps     Patient instructed in the above exercises for HEP. Patient verbalized/demonstrated understanding and was issued written handout. Also reviewed MD precaution following surgery with patient. Therapeutic Exercise and NMR EXR  [x] (15532) Provided verbal/tactile cueing for activities related to strengthening, flexibility, endurance, ROM for improvements in LE, proximal hip, and core control with self care, mobility, lifting, ambulation.  [] (11259) Provided verbal/tactile cueing for activities related to improving balance, coordination, kinesthetic sense, posture, motor skill, proprioception  to assist with LE, proximal hip, and core control in self care, mobility, lifting, ambulation and eccentric single leg control.  [address] and Therapeutic Activities:    [] (14764 or 38236) Provided verbal/tactile cueing for activities related to improving balance, coordination, kinesthetic sense, posture, motor skill, proprioception and motor activation to allow for proper function of core, proximal hip and LE with self care and ADLs and functional mobility.   [] (74210) Gait Re-education- Provided training and instruction to the patient for proper LE, core and proximal hip recruitment and positioning and eccentric body weight control with ambulation re-education including up and down stairs     Gait Training:  [] (71457) Provided training and instruction to the patient for proper postural muscle recruitment and positioning with ambulation re-education     Home Exercise Program:    [x] []? Not Met: []? Adjusted  ASSESSMENT:  See eval    Treatment/Activity Tolerance:  [x] Patient tolerated treatment well [] Patient limited by fatique  [] Patient limited by pain  [] Patient limited by other medical complications  [] Other:     Overall Progression Towards Functional goals/ Treatment Progress Update:  [x] Patient is progressing as expected towards functional goals listed. [] Progression is slowed due to complexities/Impairments listed. [] Progression has been slowed due to co-morbidities. [] Plan just implemented, too soon to assess goals progression <30days   [] Goals require adjustment due to lack of progress  [] Patient is not progressing as expected and requires additional follow up with physician  [] Other    Prognosis for POC: [x] Good [] Fair  [] Poor    Patient requires continued skilled intervention: [] Yes  [x] No        PLAN: .     [] Continue per plan of care [] Alter current plan (see comments)  [] Plan of care initiated [x] Hold pending MD visit [] Discharge    Electronically signed by: Eze Morgan PT    Note: If patient does not return for scheduled/recommended follow up visits, this note will serve as a discharge from care along with the most recent update on progress.

## 2021-07-08 ENCOUNTER — HOSPITAL ENCOUNTER (OUTPATIENT)
Dept: PHYSICAL THERAPY | Age: 56
Setting detail: THERAPIES SERIES
Discharge: HOME | End: 2021-07-08
Payer: COMMERCIAL

## 2021-07-08 PROCEDURE — 97110 THERAPEUTIC EXERCISES: CPT

## 2021-07-08 PROCEDURE — 97140 MANUAL THERAPY 1/> REGIONS: CPT

## 2021-07-08 PROCEDURE — 97112 NEUROMUSCULAR REEDUCATION: CPT

## 2021-07-08 NOTE — FLOWSHEET NOTE
Physical Therapy Re-Certification Plan of Care/MD UPDATE      Dear   Dr Ioana Glass,    We had the pleasure of treating the following patient for physical therapy services at [facility]. A summary of our findings can be found in the updated assessment below. This includes our plan of care. If you have any questions or concerns regarding these findings, please do not hesitate to contact me at the office phone number checked above. Thank you for the referral.     Physician Signature:________________________________Date:__________________  By signing above (or electronic signature), therapists plan is approved by physician    Date Range Of Visits: 21 to 21  Total Visits to Date: 9  Overall Response to Treatment:   [x]Patient is responding well to treatment and improvement is noted with regards  to goals   []Patient should continue to improve in reasonable time if they continue HEP   []Patient has plateaued and is no longer responding to skilled PT intervention    []Patient is getting worse and would benefit from return to referring MD   []Patient unable to adhere to initial POC   []Other:       Recommendation:    [x]Continue PT 1-2x / wk for 2-4 weeks.     []Hold PT, pending MD visit              Physical Therapy Treatment Note/ Progress Report:     Date:  2021    Patient Name:  Lopez Jacques    :  1965  MRN: [id number]    Pertinent Medical History:     Medical/Treatment Diagnosis Information:  Diagnosis: Avascular necrosis of left femur ([facility] [address] (M87.052  Treatment Diagnosis: Left hip weakness limiting tolerance to walking, standing and adl's    Insurance/Certification information:  PT Insurance Information: [employer] Esquivel  Physician Information:  Referring Practitioner: Dr Pantera Cai of care signed (Y/N): routed 21    Date of Patient follow up with Physician:      Progress Report: []  Yes  [x]  No     Date Range for reporting period:  Beginning: 7/8/2021  Ending:      Progress report due (10 Rx/or 30 days whichever is less): 8/22/59     Recertification due (POC duration/ or 90 days whichever is less):     Visit # POC/Insurance Allowable Auth Needed   10 BOMN []Yes   []No     Latex Allergy:  [x]NO      []YES  Preferred Language for Healthcare:   [x]English       []Other:    Functional Scale:      Date assessed: at eval  Test:LEFS post eval 5/25/21 =39 ,   6/29/21 = 55  Score:     Pain level:  0/10     History of Injury: DOS 5/4/21 for a L JENNIFER anterior approach     SUBJECTIVE:    5/25/21 Pt reports decreased hip pain now compared to prior to surgery. Denies nay numbness at BLE's   6/1/21  20 min late     Pt reports no pain just stiffness. 6/3/21 Pt reports minimal discomfort/ stiffness this am.   6/8/21 25 min late    Pt reports no pain just a little ache in her hip. 6/10/21  15 min late   Pt reports no pain today in her hip. 6/15/21: 10 min late. Pt reports no pain, just a little stiffness in her hip.  6/29/21/ 10 min  Late  Pt reports her hip has been feeling good. Only feels some pulling (not pain) with taking stairs normally. 7/7/21 Pt reported she has not done any exercises since last she was here as her father had passed away. OBJECTIVE: Pt walks without a device. Ascends stairs taking reciprocal steps minimal difficulty at LLE. Descends taking reciprocal steps with moderate difficulty at LLE. Rails used each way.       Test measurements:    tFunctional testing Prehab        Date    Re eval   post op Date  4 week f/u   Date 6/8/221   8 week f/u  Date   7/8/21   D/C  Date           TUG        30 second sit to stand        6 minute walk                Balance        Narrow DARIN        Semi tandem        Tandem         SLS                Knee AROM        Knee Extension MMT R/L L=  R= L=37  R=50 L=63# L=65#    Hip aBduction MMT R/L L=  R= L=3#  R na L=20# L=41#    Hip Flexion MMT      L=24#  R=22#    6/29/21 MMT left hip abd 4/5 flexion 3+/5  RESTRICTIONS/PRECAUTIONS:     Exercises/Interventions:     Therapeutic Ex (32324)   Min: Reps/Resistance Notes/CUES   Nu step L4 x 5 min     Leg press Bilat 90# 20 x 2         incline 2 min    Mini squats  5\" 15 x Cues for equal WB'ing L and R    Standing alt hip abd. Mat ex     ppt    Bridge partial    clams    LAQ    Hams curls    SL'ing hip abd With assist   Supine alt hip march 15 x    Manual Intervention ([phone])  Min:     Gentle DTM Left hip, thigh and delia surgical scar     Gentle PROM Hip flexion to 90, hip IR and ER (end range avoided)    Gentle stretch To left add, and Hams          NMR re-education (17538)  Min:     sls airex 10\" alt 3 min Opp. TTWB'ing   Weight shifting          Airex balance      wobble 3 min  Cues for posture/ control    Step ups  Fwd 6\" 15 x  Lat 6\" 15 x  Cues for rest and eccentric control, fatigue observed descending    STEP HIP HIKE  15 x                         Therapeutic Activity (15705)  Min:     Stair training  6/29/21 Ascend/ descend stairs using railings and taking reciprocal steps , pt demonstrated good tolerance, balance and stability  5 sets on practice steps                         Gait Training (78343)  Min:       5/25/21  The patient was educated on and practiced gait with a spc  on a level surface. They demonstrated proper technique, good safety awareness and good posture following the instruction. Balance was observed to be good. All patient questions regarding gait were answered. The patient was educated on stairs using a spc and a single railing  one step at a time for both ascending and descending stairs. they were instruction to lead up the strong RLE and lead down steps with the weaker ( LLE). \"Up with the good and down with the bad\" was used as a quip to help the patient remember the sequence. They demonstrated proper technique and verbalized understanding to stair climbing instruction. All patient questions regarding stairs were answered. 5/25/21 Advised pt to obtain an adjustable spc for use in home at this time, She will continue to use her rolling walker out of house   She verbalized understanding   Gait  6/1/21 Advised pt to walk without spc in home Pt verbalized understanding. Modalities  Min:            Other Therapeutic Activities: Patient was thoroughly educated on this date regarding prehabilitation goals, importance of PT sessions in improving overall ROM, strength and stability prior to surgery, and how prehabilitation will facilitate improved post-operative outcomes. The patient was educated on and instructed in HEP as listed. The patient was given a detailed handout for exercises to initiate in the hospital post-operatively as well as at home. The discharge plan from the hospital was reviewed with the patient; specifically, to reduce length of hospital stay and to minimize time before reinitiating outpatient physical therapy after surgery. Education regarding early mobility post-operatively in the hospital and emphasis on working with both physical therapy and nursing staff to achieve ambulation goal was provided. The patient was highly encouraged to attend joint class in hospital prior to surgery for further instructions on pre and post-surgical care. Also, the patient was educated on precautions after hip replacement to avoid, specifically, hip extension and repetitive hip flexion. It is in my medical opinion that this patient is clear from all physical barriers prior to consideration for surgery, activity modifications prior to and post operatively have been discussed with this patient as well as discharge planning and is cleared for surgery from physical therapy perspective. Home Exercise Program:   Access Code: [email or web address] com/Date: 06/29/2021Prepared by: Xin AlvaradoExjulian   Clamunirl - 1 x daily - 3 x weekly - 2 sets - 15 reps - 3 hold   Sidelying Hip Abduction - 1 x daily - 3 x weekly - 2 sets - 15 reps - 3 hold   Bridge - 1 x daily - 3 x weekly - 1-2 sets - 15 reps - 5-10 hold   Supine March with Posterior Pelvic Tilt - 1 x daily - 3 x weekly - 2 sets - 15 reps - 2-3 hold    Access Code: [email or web address] com/Date: 06/29/2021Prepared by: Dorotha Peek - 1 x daily - 3 x weekly - 1-2 sets - 15 reps - 5 hold   Alternating Single Leg Balance - Foot Behind - 1 x daily - 3 x weekly - 1-2 sets - 10-15 reps - 10 hold   Side Stepping with Resistance at Thighs - 1 x daily - 3 x weekly - 4 sets - 10 reps   Forward Step Up - 1 x daily - 3 x weekly - 1-2 sets - 15 reps   Lateral Step Up - 1 x daily - 3 x weekly - 1-2 sets - 15 reps   Hip Hiking on Step - 1 x daily - 3 x weekly - 1-2 sets - 10 reps     Patient instructed in the above exercises for HEP. Patient verbalized/demonstrated understanding and was issued written handout. Also reviewed MD precaution following surgery with patient. Therapeutic Exercise and NMR EXR  [x] (21088) Provided verbal/tactile cueing for activities related to strengthening, flexibility, endurance, ROM for improvements in LE, proximal hip, and core control with self care, mobility, lifting, ambulation.  [] (28800) Provided verbal/tactile cueing for activities related to improving balance, coordination, kinesthetic sense, posture, motor skill, proprioception  to assist with LE, proximal hip, and core control in self care, mobility, lifting, ambulation and eccentric single leg control.  [address] and Therapeutic Activities:    [] (09874 or 48096) Provided verbal/tactile cueing for activities related to improving balance, coordination, kinesthetic sense, posture, motor skill, proprioception and motor activation to allow for proper function of core, proximal hip and LE with self care and ADLs and functional mobility.   [] (07256) Gait Re-education- Provided training and instruction to the patient for proper LE, core and proximal hip recruitment and positioning and eccentric body weight control with ambulation re-education including up and down stairs     Gait Training:  [] (78033) Provided training and instruction to the patient for proper postural muscle recruitment and positioning with ambulation re-education     Home Exercise Program:    [x] (25326) Reviewed/Progressed HEP activities related to strengthening, flexibility, endurance, ROM of core, proximal hip and LE for functional self-care, mobility, lifting and ambulation/stair navigation   [] (64121)Reviewed/Progressed HEP activities related to improving balance, coordination, kinesthetic sense, posture, motor skill, proprioception of core, proximal hip and LE for self care, mobility, lifting, and ambulation/stair navigation      Manual Treatments:  PROM / STM / Oscillations-Mobs:  G-I, II, III, IV (PA's, Inf., Post.)  [x] (54690) Provided manual therapy to mobilize LE, proximal hip and/or LS spine soft tissue/joints for the purpose of modulating pain, promoting relaxation,  increasing ROM, reducing/eliminating soft tissue swelling/inflammation/restriction, improving soft tissue extensibility and allowing for proper ROM for normal function with self care, mobility, lifting and ambulation. Charges:  Timed Code Treatment Minutes: 48   Total Treatment Minutes: 50     [] EVAL (LOW) 63653 (typically 20 minutes face-to-face)  [] EVAL (MOD) 00133 (typically 30 minutes face-to-face)  [] EVAL (HIGH) 97607 (typically 45 minutes face-to-face)  [] RE-EVAL     [x] BM(60000) x    [] Dry needle 1 or 2 Muscles (40647)  [x] NMR (13509) x     [] Dry needle 3+ Muscles (64131)  [x] Manual (48345) x 1    [] Ultrasound (90802) x  [] TA (61262) x     [] Mech Traction (38737)  [] ES(attended) (67370)     [] ES (un) (58962):   [] Vasopump (79165) [] Ionto (18440)   [] Gait (28532)  [] Other:    GOALS:  Patient stated goal: better motion and relieve pain  []? Progressing: [x]? Met: []? Not Met: []?  Adjusted 1. Pt will walk at a community level without a device   []? Progressing: [x]? Met: []? Not Met: []? Adjusted  2. Decrease c/o pain 0-1/10 with adl's   []? Progressing: [x]? Met: []? Not Met: []? Adjusted  3. Improver left hip and LE strength to 4/5 or better to facilitate patients goals   [x]? Progressing: []? Met: []? Not Met: []? Adjusted  4. Ascend/ descend stairs taking reciprocal steps without use of rails demonstrating good stability and tolerance. [x]? Progressing: []? Met: []? Not Met: []? Adjusted  ASSESSMENT:  See eval    Treatment/Activity Tolerance:  [x] Patient tolerated treatment well [] Patient limited by fatique  [] Patient limited by pain  [] Patient limited by other medical complications  [] Other:     Overall Progression Towards Functional goals/ Treatment Progress Update:  [x] Patient is progressing as expected towards functional goals listed. [] Progression is slowed due to complexities/Impairments listed. [] Progression has been slowed due to co-morbidities. [] Plan just implemented, too soon to assess goals progression <30days   [] Goals require adjustment due to lack of progress  [] Patient is not progressing as expected and requires additional follow up with physician  [] Other    Prognosis for POC: [x] Good [] Fair  [] Poor    Patient requires continued skilled intervention: [] Yes  [x] No        PLAN: . Last scheduled Rx 7/29/21            ADD T band side stepping and butterfly bridges      [] Continue per plan of care [] Alter current plan (see comments)  [] Plan of care initiated [x] Hold pending MD visit [] Discharge    Electronically signed by: Denisha Colorado PT    Note: If patient does not return for scheduled/recommended follow up visits, this note will serve as a discharge from care along with the most recent update on progress.

## 2021-07-13 ENCOUNTER — HOSPITAL ENCOUNTER (OUTPATIENT)
Dept: PHYSICAL THERAPY | Age: 56
Setting detail: THERAPIES SERIES
Discharge: HOME | End: 2021-07-13
Payer: COMMERCIAL

## 2021-07-13 PROCEDURE — 97112 NEUROMUSCULAR REEDUCATION: CPT

## 2021-07-13 PROCEDURE — 97140 MANUAL THERAPY 1/> REGIONS: CPT

## 2021-07-13 PROCEDURE — 97110 THERAPEUTIC EXERCISES: CPT

## 2021-07-13 RX ORDER — ACYCLOVIR 200 MG/1
CAPSULE ORAL
Qty: 90 CAPSULE | Refills: 5 | Status: SHIPPED | OUTPATIENT
Start: 2021-07-13 | End: 2022-05-18

## 2021-07-13 NOTE — FLOWSHEET NOTE
Physical Therapy Re-Certification Plan of Care/MD UPDATE      Dear   Dr Dariusz Luong,    We had the pleasure of treating the following patient for physical therapy services at [facility]. A summary of our findings can be found in the updated assessment below. This includes our plan of care. If you have any questions or concerns regarding these findings, please do not hesitate to contact me at the office phone number checked above. Thank you for the referral.     Physician Signature:________________________________Date:__________________  By signing above (or electronic signature), therapists plan is approved by physician    Date Range Of Visits: 21 to 21  Total Visits to Date: 9  Overall Response to Treatment:   [x]Patient is responding well to treatment and improvement is noted with regards  to goals   []Patient should continue to improve in reasonable time if they continue HEP   []Patient has plateaued and is no longer responding to skilled PT intervention    []Patient is getting worse and would benefit from return to referring MD   []Patient unable to adhere to initial POC   []Other:       Recommendation:    [x]Continue PT 1-2x / wk for 2-4 weeks.     []Hold PT, pending MD visit              Physical Therapy Treatment Note/ Progress Report:     Date:  2021    Patient Name:  Mae Winters    :  1965  MRN: [id number]    Pertinent Medical History:     Medical/Treatment Diagnosis Information:  Diagnosis: Avascular necrosis of left femur ([facility] [address] (M87.052  Treatment Diagnosis: Left hip weakness limiting tolerance to walking, standing and adl's    Insurance/Certification information:  PT Insurance Information: Kenneth [employer] 150  Physician Information:  Referring Practitioner: Dr Geronimo Kirk of care signed (Y/N): routed 21    Date of Patient follow up with Physician:      Progress Report: []  Yes  [x]  No     Date Range for reporting period:  Beginnin2021  Ending:      Progress report due (10 Rx/or 30 days whichever is less):      Recertification due (POC duration/ or 90 days whichever is less):     Visit # POC/Insurance Allowable Auth Needed   11 BOMN []Yes   []No     Latex Allergy:  [x]NO      []YES  Preferred Language for Healthcare:   [x]English       []Other:    Functional Scale:      Date assessed: at eval  Test:LEFS post eval 21 =39 ,   21 = 55  Score:     Pain level:  0/10     History of Injury: DOS 21 for a L JENNIFER anterior approach     SUBJECTIVE:    21 Pt reports decreased hip pain now compared to prior to surgery. Denies nay numbness at BLE's   21  20 min late     Pt reports no pain just stiffness. 6/3/21 Pt reports minimal discomfort/ stiffness this am.   21 25 min late    Pt reports no pain just a little ache in her hip. 6/10/21  15 min late   Pt reports no pain today in her hip. 6/15/21: 10 min late. Pt reports no pain, just a little stiffness in her hip.  21/ 10 min  Late  Pt reports her hip has been feeling good. Only feels some pulling (not pain) with taking stairs normally. 21 Pt reported she has not done any exercises since last she was here as her father had passed away. 21 Pt reports no pain at this time but does feel an occasional pulling sensation at her hip climbing stairs. OBJECTIVE: Pt walks without a device. Ascends stairs taking reciprocal steps minimal difficulty at LLE. Descends taking reciprocal steps with moderate difficulty at LLE. Rails used each way.       Test measurements:    tFunctional testing Prehab        Date    Re eval   post op Date  4 week f/u   Date    8 week f/u  Date   21   D/C  Date           TUG        30 second sit to stand        6 minute walk                Balance        Narrow DARIN        Semi tandem        Tandem         SLS                Knee AROM        Knee Extension MMT R/L L=  R= L=37  R=50 L=63# L=65# Hip aBduction MMT R/L L=  R= L=3#  R na L=20# L=41#    Hip Flexion MMT      L=24#  R=22#    6/29/21 MMT left hip abd 4/5 flexion 3+/5  RESTRICTIONS/PRECAUTIONS:     Exercises/Interventions:     Therapeutic Ex (71452)   Min: Reps/Resistance Notes/CUES   Nu step L4 x 5 min     Leg press Bilat 90# 20 x 2         incline 2 min    Mini squats  5\" 15 x Cues for equal WB'ing L and R    Standing alt hip abd. Mat ex     ppt    Bridge partial    clams    LAQ    Hams curls    SL'ing hip abd With assist   Supine alt hip march 15 x    Manual Intervention ([phone])  Min:     Gentle DTM Left hip, thigh and delia surgical scar     Gentle PROM Hip flexion to 90, hip IR and ER (end range avoided)    Gentle stretch To left add, and Hams          NMR re-education (49473)  Min:     sls airex 10\" alt 3 min Opp. TTWB'ing   Weight shifting          Airex balance      wobble 3 min  Cues for posture/ control    Step ups  Fwd 6\" 15 x  Lat 6\" 15 x  7/13/21  Pt reported discomfort with 6\" therefore decreased step to 4\" , pt reported good tolerance,  She was advised to avoid any exercise in therapy and at home that increased discomfort at her left hip, she verbalized understanding    STEP HIP HIKE  15 x                         Therapeutic Activity (38197)  Min:     Stair training  6/29/21 Ascend/ descend stairs using railings and taking reciprocal steps , pt demonstrated good tolerance, balance and stability  5 sets on practice steps                         Gait Training (45699)  Min:       5/25/21  The patient was educated on and practiced gait with a spc  on a level surface. They demonstrated proper technique, good safety awareness and good posture following the instruction. Balance was observed to be good. All patient questions regarding gait were answered. The patient was educated on stairs using a spc and a single railing  one step at a time for both ascending and descending stairs.    they were instruction to lead up the strong RLE and lead down steps with the weaker ( LLE). \"Up with the good and down with the bad\" was used as a quip to help the patient remember the sequence. They demonstrated proper technique and verbalized understanding to stair climbing instruction. All patient questions regarding stairs were answered. 5/25/21 Advised pt to obtain an adjustable spc for use in home at this time, She will continue to use her rolling walker out of house   She verbalized understanding   Gait  6/1/21 Advised pt to walk without spc in home Pt verbalized understanding. Modalities  Min:            Other Therapeutic Activities: Patient was thoroughly educated on this date regarding prehabilitation goals, importance of PT sessions in improving overall ROM, strength and stability prior to surgery, and how prehabilitation will facilitate improved post-operative outcomes. The patient was educated on and instructed in HEP as listed. The patient was given a detailed handout for exercises to initiate in the hospital post-operatively as well as at home. The discharge plan from the hospital was reviewed with the patient; specifically, to reduce length of hospital stay and to minimize time before reinitiating outpatient physical therapy after surgery. Education regarding early mobility post-operatively in the hospital and emphasis on working with both physical therapy and nursing staff to achieve ambulation goal was provided. The patient was highly encouraged to attend joint class in hospital prior to surgery for further instructions on pre and post-surgical care. Also, the patient was educated on precautions after hip replacement to avoid, specifically, hip extension and repetitive hip flexion.  It is in my medical opinion that this patient is clear from all physical barriers prior to consideration for surgery, activity modifications prior to and post operatively have been discussed with this patient as well as discharge planning and is cleared for surgery from physical therapy perspective. Home Exercise Program:   Access Code: [email or web address]: [employer]/Date: 06/29/2021Prepared by: Azalia Castanonlois - 1 x daily - 3 x weekly - 2 sets - 15 reps - 3 hold   Sidelying Hip Abduction - 1 x daily - 3 x weekly - 2 sets - 15 reps - 3 hold   Bridge - 1 x daily - 3 x weekly - 1-2 sets - 15 reps - 5-10 hold   Supine March with Posterior Pelvic Tilt - 1 x daily - 3 x weekly - 2 sets - 15 reps - 2-3 hold    Access Code: [email or web address]: [employer]/Date: 06/29/2021Prepared by: Odalyssam Salomon - 1 x daily - 3 x weekly - 1-2 sets - 15 reps - 5 hold   Alternating Single Leg Balance - Foot Behind - 1 x daily - 3 x weekly - 1-2 sets - 10-15 reps - 10 hold   Side Stepping with Resistance at Thighs - 1 x daily - 3 x weekly - 4 sets - 10 reps   Forward Step Up - 1 x daily - 3 x weekly - 1-2 sets - 15 reps   Lateral Step Up - 1 x daily - 3 x weekly - 1-2 sets - 15 reps   Hip Hiking on Step - 1 x daily - 3 x weekly - 1-2 sets - 10 reps     Patient instructed in the above exercises for HEP. Patient verbalized/demonstrated understanding and was issued written handout. Also reviewed MD precaution following surgery with patient. Therapeutic Exercise and NMR EXR  [x] (46375) Provided verbal/tactile cueing for activities related to strengthening, flexibility, endurance, ROM for improvements in LE, proximal hip, and core control with self care, mobility, lifting, ambulation.  [] (63521) Provided verbal/tactile cueing for activities related to improving balance, coordination, kinesthetic sense, posture, motor skill, proprioception  to assist with LE, proximal hip, and core control in self care, mobility, lifting, ambulation and eccentric single leg control.  52383    NMR and Therapeutic Activities:    [] (66077 or ) Provided verbal/tactile cueing for activities related to improving balance, coordination, kinesthetic sense, posture, motor skill, proprioception and motor activation to allow for proper function of core, proximal hip and LE with self care and ADLs and functional mobility.   [] (80559) Gait Re-education- Provided training and instruction to the patient for proper LE, core and proximal hip recruitment and positioning and eccentric body weight control with ambulation re-education including up and down stairs     Gait Training:  [] (10798) Provided training and instruction to the patient for proper postural muscle recruitment and positioning with ambulation re-education     Home Exercise Program:    [x] (01655) Reviewed/Progressed HEP activities related to strengthening, flexibility, endurance, ROM of core, proximal hip and LE for functional self-care, mobility, lifting and ambulation/stair navigation   [] (00015)Reviewed/Progressed HEP activities related to improving balance, coordination, kinesthetic sense, posture, motor skill, proprioception of core, proximal hip and LE for self care, mobility, lifting, and ambulation/stair navigation      Manual Treatments:  PROM / STM / Oscillations-Mobs:  G-I, II, III, IV (PA's, Inf., Post.)  [x] (34014) Provided manual therapy to mobilize LE, proximal hip and/or LS spine soft tissue/joints for the purpose of modulating pain, promoting relaxation,  increasing ROM, reducing/eliminating soft tissue swelling/inflammation/restriction, improving soft tissue extensibility and allowing for proper ROM for normal function with self care, mobility, lifting and ambulation.      Charges:  Timed Code Treatment Minutes: 48   Total Treatment Minutes: 50     [] EVAL (LOW) 09731 (typically 20 minutes face-to-face)  [] EVAL (MOD) 76620 (typically 30 minutes face-to-face)  [] EVAL (HIGH) 53481 (typically 45 minutes face-to-face)  [] RE-EVAL     [x] QV(51039) x    [] Dry needle 1 or 2 Muscles (98934)  [x] NMR (84090) x     [] Dry needle 3+ Muscles (87935)  [x] Manual (90586) x 1    [] Ultrasound (91841) x  [] TA (89400) x     [] Mech Traction (41040)  [] ES(attended) (40020)     [] ES (un) (65047):   [] Vasopump (99794) [] Ionto (81678)   [] Gait (07752)  [] Other:    GOALS:  Patient stated goal: better motion and relieve pain  []? Progressing: [x]? Met: []? Not Met: []? Adjusted   1. Pt will walk at a community level without a device   []? Progressing: [x]? Met: []? Not Met: []? Adjusted  2. Decrease c/o pain 0-1/10 with adl's   []? Progressing: [x]? Met: []? Not Met: []? Adjusted  3. Improver left hip and LE strength to 4/5 or better to facilitate patients goals   [x]? Progressing: []? Met: []? Not Met: []? Adjusted  4. Ascend/ descend stairs taking reciprocal steps without use of rails demonstrating good stability and tolerance. [x]? Progressing: []? Met: []? Not Met: []? Adjusted  ASSESSMENT:  See eval    Treatment/Activity Tolerance:  [x] Patient tolerated treatment well [] Patient limited by fatique  [] Patient limited by pain  [] Patient limited by other medical complications  [] Other:     Overall Progression Towards Functional goals/ Treatment Progress Update:  [x] Patient is progressing as expected towards functional goals listed. [] Progression is slowed due to complexities/Impairments listed. [] Progression has been slowed due to co-morbidities.   [] Plan just implemented, too soon to assess goals progression <30days   [] Goals require adjustment due to lack of progress  [] Patient is not progressing as expected and requires additional follow up with physician  [] Other    Prognosis for POC: [x] Good [] Fair  [] Poor    Patient requires continued skilled intervention: [] Yes  [x] No        PLAN: .            ADD T band side stepping and butterfly bridges to HEP DC next Rx/ cancel remaining sessions as pt is reaching a plateau       [] Continue per plan of care [] Alter current plan (see comments)  [] Plan of care initiated [x] Hold pending MD visit [] Discharge    Electronically

## 2021-07-15 ENCOUNTER — HOSPITAL ENCOUNTER (OUTPATIENT)
Dept: PHYSICAL THERAPY | Age: 56
Setting detail: THERAPIES SERIES
Discharge: HOME | End: 2021-07-15
Payer: COMMERCIAL

## 2021-07-15 PROCEDURE — 97110 THERAPEUTIC EXERCISES: CPT

## 2021-07-15 PROCEDURE — 97112 NEUROMUSCULAR REEDUCATION: CPT

## 2021-07-15 NOTE — FLOWSHEET NOTE
[facility]. [address]  Phone: [phone]   Fax:     [phone]     Physical Therapy Discharge Summary    Dear  Dr Alonzo Ambriz ,    We had the pleasure of treating the following patient for physical therapy services at [address] [place] [address]. A summary of our findings can be found in the discharge summary below. If you have any questions or concerns regarding these findings, please do not hesitate to contact me at the office phone number above.   Thank you for the referral.     Physician Signature:________________________________Date:__________________  By signing above (or electronic signature), therapists plan is approved by physician      Overall Response to Treatment:   []Patient is responding well to treatment and improvement is noted with regards  to goals   []Patient should continue to improve in reasonable time if they continue HEP   [x]Patient has plateaued and is no longer responding to skilled PT intervention    []Patient is getting worse and would benefit from return to referring MD   []Patient unable to adhere to initial POC   []Other:     Date range of Visits: 21 to 21  Total Visits: 12            Physical Therapy Treatment Note/ Progress Report:     Date:  7/15/2021    Patient Name:  Delvin Olsen    :  1965  MRN: [id number]    Pertinent Medical History:     Medical/Treatment Diagnosis Information:  Diagnosis: Avascular necrosis of left femur ([address] (M87.052  Treatment Diagnosis: Left hip weakness limiting tolerance to walking, standing and adl's    Insurance/Certification information:  PT Insurance Information: [employer]  Physician Information:  Referring Practitioner: Dr Kip Dhillon of care signed (Y/N): routed 21    Date of Patient follow up with Physician:      Progress Report: []  Yes  [x]  No     Date Range for reporting period:  Beginnin/15/2021  Ending: Progress report due (10 Rx/or 30 days whichever is less): 4/62/82     Recertification due (POC duration/ or 90 days whichever is less):     Visit # POC/Insurance Allowable Auth Needed   12 BOMN []Yes   []No     Latex Allergy:  [x]NO      []YES  Preferred Language for Healthcare:   [x]English       []Other:    Functional Scale:      Date assessed: at eval  Test:LEFS post eval 5/25/21 =39 ,   6/29/21 = 55 7/15/21 = 58  Score:     Pain level:  0/10     History of Injury: DOS 5/4/21 for a L JENNIFER anterior approach     SUBJECTIVE:    5/25/21 Pt reports decreased hip pain now compared to prior to surgery. Denies nay numbness at BLE's   6/1/21  20 min late     Pt reports no pain just stiffness. 6/3/21 Pt reports minimal discomfort/ stiffness this am.   6/8/21 25 min late    Pt reports no pain just a little ache in her hip. 6/10/21  15 min late   Pt reports no pain today in her hip. 6/15/21: 10 min late. Pt reports no pain, just a little stiffness in her hip.  6/29/21/ 10 min  Late  Pt reports her hip has been feeling good. Only feels some pulling (not pain) with taking stairs normally. 7/7/21 Pt reported she has not done any exercises since last she was here as her father had passed away. 7/13/21 Pt reports no pain at this time but does feel an occasional pulling sensation at her hip climbing stairs. 7//15/21 Pt reports she feels about the same as the last time. OBJECTIVE: Pt walks without a device. Ascends stairs taking reciprocal steps minimal difficulty at LLE. Descends taking reciprocal steps with moderate difficulty at LLE. Rails used each way.       Test measurements:    tFunctional testing Prehab        Date    Re eval   post op Date  4 week f/u   Date 6/8/221   8 week f/u  Date   7/8/21   D/C  Date           TUG        30 second sit to stand        6 minute walk                Balance        Narrow DARIN        Semi tandem        Tandem         SLS                Knee AROM        Knee Extension MMT R/L L=  R= L=37  R=50 L=63# L=65#    Hip aBduction MMT R/L L=  R= L=3#  R na L=20# L=41#    Hip Flexion MMT      L=24#  R=22#    6/29/21 MMT left hip abd 4/5 flexion 3+/5  RESTRICTIONS/PRECAUTIONS:     Exercises/Interventions:     Therapeutic Ex (98630)   Min: Reps/Resistance Notes/CUES   Nu step L4 x 5 min     Leg press Bilat 90# 20 x 2         incline 2 min    Mini squats  5\" 15 x Cues for equal WB'ing L and R    Standing alt hip abd. Mat ex     ppt    Bridge partial    clams    LAQ    Hams curls    SL'ing hip abd With assist   Supine alt hip march 15 x    Manual Intervention ([id number])  Min: 7 min     Gentle DTM Left hip, thigh and delia surgical scar     Gentle PROM Hip flexion to 90, hip IR and ER (end range avoided)    Gentle stretch          NMR re-education (56840)  Min:     sls airex 10\" alt 3 min Opp. TTWB'ing   Weight shifting          Airex balance      wobble 3 min  Cues for posture/ control    Step ups  Fwd 6\" 15 x  Lat 6\" 15 x  7/15/21 Resumed 6\" steps , tolerated fair to well    STEP HIP HIKE  15 x                         Therapeutic Activity (55266)  Min:     Stair training  6/29/21 Ascend/ descend stairs using railings and taking reciprocal steps , pt demonstrated good tolerance, balance and stability  5 sets on practice steps                         Gait Training (14593)  Min:       5/25/21  The patient was educated on and practiced gait with a spc  on a level surface. They demonstrated proper technique, good safety awareness and good posture following the instruction. Balance was observed to be good. All patient questions regarding gait were answered. The patient was educated on stairs using a spc and a single railing  one step at a time for both ascending and descending stairs. they were instruction to lead up the strong RLE and lead down steps with the weaker ( LLE). \"Up with the good and down with the bad\" was used as a quip to help the patient remember the sequence.  They demonstrated proper technique and verbalized understanding to stair climbing instruction. All patient questions regarding stairs were answered. 5/25/21 Advised pt to obtain an adjustable spc for use in home at this time, She will continue to use her rolling walker out of house   She verbalized understanding   Gait  6/1/21 Advised pt to walk without spc in home Pt verbalized understanding. Modalities  Min:            Other Therapeutic Activities: Patient was thoroughly educated on this date regarding prehabilitation goals, importance of PT sessions in improving overall ROM, strength and stability prior to surgery, and how prehabilitation will facilitate improved post-operative outcomes. The patient was educated on and instructed in HEP as listed. The patient was given a detailed handout for exercises to initiate in the hospital post-operatively as well as at home. The discharge plan from the hospital was reviewed with the patient; specifically, to reduce length of hospital stay and to minimize time before reinitiating outpatient physical therapy after surgery. Education regarding early mobility post-operatively in the hospital and emphasis on working with both physical therapy and nursing staff to achieve ambulation goal was provided. The patient was highly encouraged to attend joint class in hospital prior to surgery for further instructions on pre and post-surgical care. Also, the patient was educated on precautions after hip replacement to avoid, specifically, hip extension and repetitive hip flexion. It is in my medical opinion that this patient is clear from all physical barriers prior to consideration for surgery, activity modifications prior to and post operatively have been discussed with this patient as well as discharge planning and is cleared for surgery from physical therapy perspective. Home Exercise Program:   Access Code: [email or web address]: [email or web address]/Date: 06/29/2021Prepared control. [address] and Therapeutic Activities:    [] (64650 or 24513) Provided verbal/tactile cueing for activities related to improving balance, coordination, kinesthetic sense, posture, motor skill, proprioception and motor activation to allow for proper function of core, proximal hip and LE with self care and ADLs and functional mobility.   [] (53167) Gait Re-education- Provided training and instruction to the patient for proper LE, core and proximal hip recruitment and positioning and eccentric body weight control with ambulation re-education including up and down stairs     Gait Training:  [] (90439) Provided training and instruction to the patient for proper postural muscle recruitment and positioning with ambulation re-education     Home Exercise Program:    [x] (86379) Reviewed/Progressed HEP activities related to strengthening, flexibility, endurance, ROM of core, proximal hip and LE for functional self-care, mobility, lifting and ambulation/stair navigation   [] (69570)Reviewed/Progressed HEP activities related to improving balance, coordination, kinesthetic sense, posture, motor skill, proprioception of core, proximal hip and LE for self care, mobility, lifting, and ambulation/stair navigation      Manual Treatments:  PROM / STM / Oscillations-Mobs:  G-I, II, III, IV (PA's, Inf., Post.)  [x] (16277) Provided manual therapy to mobilize LE, proximal hip and/or LS spine soft tissue/joints for the purpose of modulating pain, promoting relaxation,  increasing ROM, reducing/eliminating soft tissue swelling/inflammation/restriction, improving soft tissue extensibility and allowing for proper ROM for normal function with self care, mobility, lifting and ambulation.      Charges:  Timed Code Treatment Minutes: 48   Total Treatment Minutes: 50     [] EVAL (LOW) 12603 (typically 20 minutes face-to-face)  [] EVAL (MOD) 66030 (typically 30 minutes face-to-face)  [] EVAL (HIGH) 46177 (typically 45 minutes plan of care [] Alter current plan (see comments)  [] Plan of care initiated [] Hold pending MD visit [x] Discharge    Electronically signed by: Sneha Smith PT    Note: If patient does not return for scheduled/recommended follow up visits, this note will serve as a discharge from care along with the most recent update on progress.

## 2021-07-16 DIAGNOSIS — F32.1 CURRENT MODERATE EPISODE OF MAJOR DEPRESSIVE DISORDER, UNSPECIFIED WHETHER RECURRENT (HCC): ICD-10-CM

## 2021-07-16 RX ORDER — ESCITALOPRAM OXALATE 10 MG/1
10 TABLET ORAL DAILY
Qty: 90 TABLET | Refills: 0 | Status: SHIPPED | OUTPATIENT
Start: 2021-07-16 | End: 2021-07-19 | Stop reason: SDUPTHER

## 2021-07-16 RX ORDER — ESCITALOPRAM OXALATE 10 MG/1
10 TABLET ORAL DAILY
Qty: 60 TABLET | Refills: 0 | Status: SHIPPED | OUTPATIENT
Start: 2021-07-16 | End: 2021-07-16 | Stop reason: SDUPTHER

## 2021-07-16 NOTE — TELEPHONE ENCOUNTER
I fixed this already, the 61 was a mistake as that is how it had last been sent and was requested again

## 2021-07-19 DIAGNOSIS — F32.1 CURRENT MODERATE EPISODE OF MAJOR DEPRESSIVE DISORDER, UNSPECIFIED WHETHER RECURRENT (HCC): ICD-10-CM

## 2021-07-19 RX ORDER — ESCITALOPRAM OXALATE 10 MG/1
10 TABLET ORAL DAILY
Qty: 90 TABLET | Refills: 0 | Status: SHIPPED | OUTPATIENT
Start: 2021-07-19 | End: 2021-10-18

## 2021-07-19 NOTE — TELEPHONE ENCOUNTER
RECEIVED A FAX FROM [facility] ABOUT PT SCRIPT. IT LOOKS LIKE WE WERE GOING BACK AND [address]WEEN [facility] AND [place]. CALLED AND SPOKE WITH PT. SHE IS NEEDING THIS TO GO TO [place]. I RESENT SCRIPT.  HS

## 2021-07-20 ENCOUNTER — APPOINTMENT (OUTPATIENT)
Dept: PHYSICAL THERAPY | Age: 56
End: 2021-07-20
Payer: COMMERCIAL

## 2021-07-22 ENCOUNTER — OFFICE VISIT (OUTPATIENT)
Dept: ORTHOPEDIC SURGERY | Age: 56
End: 2021-07-22

## 2021-07-22 ENCOUNTER — APPOINTMENT (OUTPATIENT)
Dept: PHYSICAL THERAPY | Age: 56
End: 2021-07-22
Payer: COMMERCIAL

## 2021-07-22 VITALS
HEIGHT: 68 IN | SYSTOLIC BLOOD PRESSURE: 102 MMHG | WEIGHT: 162 LBS | DIASTOLIC BLOOD PRESSURE: 62 MMHG | HEART RATE: 83 BPM | BODY MASS INDEX: 24.55 KG/M2

## 2021-07-22 DIAGNOSIS — Z96.642 STATUS POST TOTAL REPLACEMENT OF LEFT HIP: Primary | ICD-10-CM

## 2021-07-22 PROCEDURE — 99024 POSTOP FOLLOW-UP VISIT: CPT | Performed by: ORTHOPAEDIC SURGERY

## 2021-07-22 RX ORDER — AMOXICILLIN 500 MG/1
2000 CAPSULE ORAL ONCE
Qty: 4 CAPSULE | Refills: 0 | Status: SHIPPED | OUTPATIENT
Start: 2021-07-22 | End: 2021-07-22

## 2021-07-22 NOTE — PROGRESS NOTES
[facility]  Office Visit    Chief Complaint: Follow-up s/p left total hip arthroplasty    HPI:  Madonna Ga is a 49 y. o. who is here in follow-up of left total hip arthroplasty performed on May 4, 2021 for avascular necrosis. She is doing well overall. She walks without assistive device. She has recently finished physical therapy and is doing home exercises. She reports that she has itchiness at her incision but is otherwise healed. She does reports trouble sleeping. She also reports that her right medial thigh will have an occasional burning sensation. She has pain going up and down steps and is still taking these 1 at a time. Otherwise, she notes that her pain is better than before surgery and overall she is doing well. She denies taking any kind of medication for any of these issues and does not wish to. Patient Active Problem List   Diagnosis    Hyperlipidemia    Anemia    Vitamin D deficiency    Hypertriglyceridemia    Asthma    Pituitary adenoma with extrasellar extension ([address]    Avascular necrosis of femur ([address]    Left knee pain    Avascular necrosis of left femur ([address]       ROS:  Constitutional: denies fever, chills, weight loss  MSK: denies pain in other joints, muscle aches  Neurological: denies numbness, tingling, weakness    Exam:  Blood pressure 102/62, pulse 83, height 5' 8\" (1.727 m), weight 162 lb (73.5 kg), last menstrual period 02/07/2010, not currently breastfeeding. Appearance: sitting in exam room chair, appears to be in no acute distress, awake and alert  Resp: unlabored breathing on room air  Skin: warm, dry and intact with out erythema or significant increased temperature  Neuro: grossly intact both lower extremities. Intact sensation to light touch. Motor exam 4+ to 5/5 in all major motor groups. Left hip: Incision is healed. Examination demonstrates negative logroll and negative Stinchfield.   There is brisk capillary refill. There are 2+ dorsalis pedis and posterior tibial pulses. Strength is 5/5 in hamstrings, quads, hip flexors. Imaging:  3 views of the left hip were performed and reviewed today. Significant for total hip arthroplasty prosthesis in place with no signs of osteolysis, loosening, fracture, or dislocation. There is a cable in place on the calcar. Assessment:  S/p left total hip arthroplasty for avascular necrosis    Plan:  She is doing well recovering from left total hip arthroplasty. She will continue  her home physical therapy exercises. We discussed with the patient today the use of antibiotic prophylaxis prior to any dental procedures. We discussed that the antibiotic prophylaxis would be used to help prevent periprosthetic joint infections. If they were not offered antibiotics by the physician performing the procedure, they should contact our office that we may prescribe them antibiotics. Antibiotics were prescribed today for this. We discussed her trouble falling asleep and I recommended melatonin or Benadryl. She does not want to try any medications at this time. We also discussed her right thigh burning sensation. This may be coming from her back or from her hip. She does have a small amount of avascular necrosis of the right femoral head on a prior MRI as well. Again, she does not want to have anything done for this at this time. She will follow up next May for the left hip or sooner if needed. This dictation was done with [employer] dictation and may contain mechanical errors related to translation.

## 2021-07-27 ENCOUNTER — APPOINTMENT (OUTPATIENT)
Dept: PHYSICAL THERAPY | Age: 56
End: 2021-07-27
Payer: COMMERCIAL

## 2021-07-29 ENCOUNTER — APPOINTMENT (OUTPATIENT)
Dept: PHYSICAL THERAPY | Age: 56
End: 2021-07-29
Payer: COMMERCIAL

## 2021-08-05 ENCOUNTER — HOSPITAL ENCOUNTER (OUTPATIENT)
Dept: MRI IMAGING | Age: 56
Discharge: HOME | End: 2021-08-05
Payer: COMMERCIAL

## 2021-08-05 DIAGNOSIS — D35.2 PITUITARY ADENOMA (HCC): ICD-10-CM

## 2021-08-05 PROCEDURE — 2580000003 HC RX 258: Performed by: NEUROLOGICAL SURGERY

## 2021-08-05 PROCEDURE — 70553 MRI BRAIN STEM W/O & W/DYE: CPT

## 2021-08-05 PROCEDURE — 6360000004 HC RX CONTRAST MEDICATION: Performed by: NEUROLOGICAL SURGERY

## 2021-08-05 PROCEDURE — A9577 INJ MULTIHANCE: HCPCS | Performed by: NEUROLOGICAL SURGERY

## 2021-08-05 RX ORDER — SODIUM CHLORIDE 0.9 % (FLUSH) 0.9 %
5-40 SYRINGE (ML) INJECTION PRN
Status: DISCONTINUED | OUTPATIENT
Start: 2021-08-05 | End: 2021-08-06 | Stop reason: HOSPADM

## 2021-08-05 RX ADMIN — Medication 10 ML: at 09:11

## 2021-08-05 RX ADMIN — GADOBENATE DIMEGLUMINE 15 ML: 529 INJECTION, SOLUTION INTRAVENOUS at 09:11

## 2021-08-09 DIAGNOSIS — R20.2 PARESTHESIA OF RIGHT LEG: ICD-10-CM

## 2021-08-10 RX ORDER — GABAPENTIN 300 MG/1
CAPSULE ORAL
Qty: 90 CAPSULE | Refills: 0 | OUTPATIENT
Start: 2021-08-10

## 2021-09-23 ENCOUNTER — PATIENT MESSAGE (OUTPATIENT)
Dept: FAMILY MEDICINE CLINIC | Age: 56
End: 2021-09-23

## 2021-09-23 NOTE — TELEPHONE ENCOUNTER
From: Nishant Tran  To: Carina Luo APRN - CNP  Sent: 9/23/2021 1:57 PM EDT  Subject: Non-Urgent Medical Question    Hello,    I am scheduled for a physical next week and I was wondering if it would be possible to receive my flu shot then. I have limited transportation and was not sure if the flu shots are available yet. Thank you for your response!

## 2021-09-28 DIAGNOSIS — E78.00 HYPERCHOLESTEROLEMIA: ICD-10-CM

## 2021-09-28 RX ORDER — ATORVASTATIN CALCIUM 10 MG/1
TABLET, FILM COATED ORAL
Qty: 90 TABLET | Refills: 0 | Status: SHIPPED | OUTPATIENT
Start: 2021-09-28 | End: 2021-10-18

## 2021-09-29 ENCOUNTER — OFFICE VISIT (OUTPATIENT)
Dept: FAMILY MEDICINE CLINIC | Age: 56
End: 2021-09-29
Payer: COMMERCIAL

## 2021-09-29 VITALS
SYSTOLIC BLOOD PRESSURE: 108 MMHG | HEART RATE: 67 BPM | BODY MASS INDEX: 24.64 KG/M2 | WEIGHT: 162.6 LBS | HEIGHT: 68 IN | DIASTOLIC BLOOD PRESSURE: 64 MMHG | OXYGEN SATURATION: 97 % | RESPIRATION RATE: 12 BRPM | TEMPERATURE: 97.9 F

## 2021-09-29 DIAGNOSIS — E78.00 HYPERCHOLESTEROLEMIA: ICD-10-CM

## 2021-09-29 DIAGNOSIS — Z00.00 ANNUAL PHYSICAL EXAM: Primary | ICD-10-CM

## 2021-09-29 DIAGNOSIS — F32.5 MAJOR DEPRESSIVE DISORDER WITH SINGLE EPISODE, IN FULL REMISSION (HCC): ICD-10-CM

## 2021-09-29 DIAGNOSIS — Z23 NEED FOR INFLUENZA VACCINATION: ICD-10-CM

## 2021-09-29 LAB
CHOLESTEROL, TOTAL: 236 MG/DL (ref 0–199)
HDLC SERPL-MCNC: 42 MG/DL (ref 40–60)
LDL CHOLESTEROL CALCULATED: 146 MG/DL
TRIGL SERPL-MCNC: 238 MG/DL (ref 0–150)
VLDLC SERPL CALC-MCNC: 48 MG/DL

## 2021-09-29 PROCEDURE — 90471 IMMUNIZATION ADMIN: CPT | Performed by: NURSE PRACTITIONER

## 2021-09-29 PROCEDURE — 90674 CCIIV4 VAC NO PRSV 0.5 ML IM: CPT | Performed by: NURSE PRACTITIONER

## 2021-09-29 PROCEDURE — 36415 COLL VENOUS BLD VENIPUNCTURE: CPT | Performed by: NURSE PRACTITIONER

## 2021-09-29 PROCEDURE — 99396 PREV VISIT EST AGE 40-64: CPT | Performed by: NURSE PRACTITIONER

## 2021-09-29 RX ORDER — ALENDRONATE SODIUM 70 MG/1
70 TABLET ORAL
COMMUNITY
Start: 2021-07-11 | End: 2021-09-29 | Stop reason: SINTOL

## 2021-09-29 RX ORDER — GABAPENTIN 300 MG/1
CAPSULE ORAL
COMMUNITY
Start: 2021-07-12 | End: 2022-09-08

## 2021-09-29 SDOH — ECONOMIC STABILITY: TRANSPORTATION INSECURITY
IN THE PAST 12 MONTHS, HAS LACK OF TRANSPORTATION KEPT YOU FROM MEETINGS, WORK, OR FROM GETTING THINGS NEEDED FOR DAILY LIVING?: YES

## 2021-09-29 SDOH — ECONOMIC STABILITY: FOOD INSECURITY: WITHIN THE PAST 12 MONTHS, YOU WORRIED THAT YOUR FOOD WOULD RUN OUT BEFORE YOU GOT MONEY TO BUY MORE.: OFTEN TRUE

## 2021-09-29 SDOH — ECONOMIC STABILITY: TRANSPORTATION INSECURITY
IN THE PAST 12 MONTHS, HAS THE LACK OF TRANSPORTATION KEPT YOU FROM MEDICAL APPOINTMENTS OR FROM GETTING MEDICATIONS?: YES

## 2021-09-29 SDOH — ECONOMIC STABILITY: FOOD INSECURITY: WITHIN THE PAST 12 MONTHS, THE FOOD YOU BOUGHT JUST DIDN'T LAST AND YOU DIDN'T HAVE MONEY TO GET MORE.: OFTEN TRUE

## 2021-09-29 ASSESSMENT — SOCIAL DETERMINANTS OF HEALTH (SDOH): HOW HARD IS IT FOR YOU TO PAY FOR THE VERY BASICS LIKE FOOD, HOUSING, MEDICAL CARE, AND HEATING?: HARD

## 2021-09-29 NOTE — PROGRESS NOTES
Vaccine Information Sheet, \"Influenza - Inactivated\"  given to Madonna Ga, or parent/legal guardian of  Madonna Ga and verbalized understanding. Patient responses:    Have you ever had a reaction to a flu vaccine? No  Do you have any current illness? No  Have you ever had Guillian Albany Syndrome? No  Do you have a serious allergy to any of the follow: Neomycin, Polymyxin, Thimerosal, eggs or egg products? No    Flu vaccine given per order. Please see immunization tab. Risks and benefits explained. Current VIS given.       Immunizations Administered     Name Date Dose Route    Influenza, MDCK Quadv, IM, PF (Flucelvax 2 yrs and older) 9/29/2021 0.5 mL Intramuscular    Site: Deltoid- Left    Lot: [id number]    [address]: 61809-655-39

## 2021-09-29 NOTE — PATIENT INSTRUCTIONS
Patient Education        Well Visit, Women 48 to 72: Care Instructions  Overview     Well visits can help you stay healthy. Your doctor has checked your overall health and may have suggested ways to take good care of yourself. Your doctor also may have recommended tests. At home, you can help prevent illness with healthy eating, regular exercise, and other steps. Follow-up care is a key part of your treatment and safety. Be sure to make and go to all appointments, and call your doctor if you are having problems. It's also a good idea to know your test results and keep a list of the medicines you take. How can you care for yourself at home? · Get screening tests that you and your doctor decide on. Screening helps find diseases before any symptoms appear. · Eat healthy foods. Choose fruits, vegetables, whole grains, protein, and low-fat dairy foods. Limit fat, especially saturated fat. Reduce salt in your diet. · Limit alcohol. Have no more than 1 drink a day or 7 drinks a week. · Get at least 30 minutes of exercise on most days of the week. Walking is a good choice. You also may want to do other activities, such as running, swimming, cycling, or playing tennis or team sports. · Reach and stay at a healthy weight. This will lower your risk for many problems, such as obesity, diabetes, heart disease, and high blood pressure. · Do not smoke. Smoking can make health problems worse. If you need help quitting, talk to your doctor about stop-smoking programs and medicines. These can increase your chances of quitting for good. · Care for your mental health. It is easy to get weighed down by worry and stress. Learn strategies to manage stress, like deep breathing and mindfulness, and stay connected with your family and community. If you find you often feel sad or hopeless, talk with your doctor. Treatment can help.   · Talk to your doctor about whether you have any risk factors for sexually transmitted infections (STIs). You can help prevent STIs if you wait to have sex with a new partner (or partners) until you've each been tested for STIs. It also helps if you use condoms (male or female condoms) and if you limit your sex partners to one person who only has sex with you. Vaccines are available for some STIs. · If you think you may have a problem with alcohol or drug use, talk to your doctor. This includes prescription medicines (such as amphetamines and opioids) and illegal drugs (such as cocaine and methamphetamine). Your doctor can help you figure out what type of treatment is best for you. · Protect your skin from too much sun. When you're outdoors from 10 a.m. to 4 p.m., stay in the shade or cover up with clothing and a hat with a wide brim. Wear sunglasses that block UV rays. Even when it's cloudy, put broad-spectrum sunscreen (SPF 30 or higher) on any exposed skin. · See a dentist one or two times a year for checkups and to have your teeth cleaned. · Wear a seat belt in the car. When should you call for help? Watch closely for changes in your health, and be sure to contact your doctor if you have any problems or symptoms that concern you. Where can you learn more? Go to https://[employer]visorpartners. org and sign in to your [employer] account. Enter F404 in the [facility] box to learn more about \"Well Visit, Women 50 to 72: Care Instructions. \"     If you do not have an account, please click on the \"Sign Up Now\" link. Current as of: February 11, 2021               Content Version: 13.0  © 8025-0045 Healthwise, Incorporated. Care instructions adapted under license by [facility] ([facility]). If you have questions about a medical condition or this instruction, always ask your healthcare professional. [place] [address] any warranty or liability for your use of this information.

## 2021-09-29 NOTE — PROGRESS NOTES
History and Physical      [facility] Derrek Draper  YOB: 1965    Date of Service:  9/29/2021    Chief Complaint:   Sarika Escudero is a 49 y.o. female who presents for complete physical examination. HPI:     ANNUAL EXAM   SHE WOULD LIKE TO HAVE HER LABS DRAWN TODAY    Wt Readings from Last 3 Encounters:   07/22/21 162 lb (73.5 kg)   05/17/21 162 lb (73.5 kg)   05/05/21 182 lb 8.7 oz (82.8 kg)     BP Readings from Last 3 Encounters:   07/22/21 102/62   05/05/21 (!) 108/59   05/04/21 (!) 115/58       Patient Active Problem List   Diagnosis    Hyperlipidemia    Anemia    Vitamin D deficiency    Hypertriglyceridemia    Asthma    Pituitary adenoma with extrasellar extension ([address]    Avascular necrosis of femur ([address]    Left knee pain    Avascular necrosis of left femur (HCC)       Allergies   Allergen Reactions    Other     Contrast [Iodides]      Rash all over body, low grade fever, body aches. Went to ER next day for treatment. No outpatient medications have been marked as taking for the 9/29/21 encounter (Appointment) with SHON Pang CNP. Past Medical History:   Diagnosis Date    Anemia     iron defieciency    Asthma     Brain tumor ([address]     surgery for brain tumor 3/15/18    Hemorrhagic cyst of ovary 2007    Left    Hyperlipidemia     Tubular adenoma of colon 03/02/2018    COLONOSCOPY, DR CLAUDINE KHAN, TUBULAR ADENOMAS ASCENDING COLON, 3 MM, 4 MM, 5 MM REMOVED. Past Surgical History:   Procedure Laterality Date    BREAST ENHANCEMENT SURGERY      COLONOSCOPY  03/02/2018    COLONOSCOPY, DR CLAUDINE KHAN, 3 MM, 4 MM, 5 MM ASCENDING COLON POLYP REMOVED. LIMITED PREP. REPEAT 3-6 MONTHS.     EPIGASTRIC HERNIA REPAIR  10/30/2017    with mesh     OTHER SURGICAL HISTORY Left     lump removed from under left arm pit in her 31's    PITUITARY SURGERY N/A     brain surgery    TOTAL HIP ARTHROPLASTY Left 5/4/2021    LEFT ANTERIOR TOTAL HIP REPLACEMENT WITH C-ARM performed by Ioana Glass MD at [address]   [address]  12/06/2016    and umbilectomy     Family History   Problem Relation Age of Onset    High Cholesterol Mother     Diabetes Mother     High Cholesterol Father     Diabetes Father     Cancer Maternal Grandmother         colon    Cancer Paternal Grandmother         colon cancer     Social History     Socioeconomic History    Marital status: Legally      Spouse name: Not on file    Number of children: Not on file    Years of education: Not on file    Highest education level: Not on file   Occupational History    Not on file   Tobacco Use    Smoking status: Never Smoker    Smokeless tobacco: Never Used   Vaping Use    Vaping Use: Never used   Substance and Sexual Activity    Alcohol use: No    Drug use: No    Sexual activity: Not Currently   Other Topics Concern    Not on file   Social History Narrative    ** Merged History Encounter **    No exercise program. In PT for left hip x 3 wks. 10/5/20. Social Determinants of Health     Financial Resource Strain:     Difficulty of Paying Living Expenses:    Food Insecurity:     Worried About Running Out of Food in the Last Year:     [address] in the Last Year:    Transportation Needs:     Lack of Transportation (Medical):      Lack of Transportation (Non-Medical):    Physical Activity:     Days of Exercise per Week:     Minutes of Exercise per Session:    Stress:     Feeling of Stress :    Social Connections:     Frequency of Communication with Friends and Family:     Frequency of Social Gatherings with Friends and Family:     Attends Anab[employer]ist Services:     Active Member of Clubs or Organizations:     Attends Club or Organization Meetings:     Marital Status:    Intimate Partner Violence:     Fear of Current or Ex-Partner:     Emotionally Abused:     Physically Abused:     Sexually Abused: Review of Systems:  A comprehensive review of systems was negative except for what was noted in the HPI. Physical Exam:   There were no vitals filed for this visit. There is no height or weight on file to calculate BMI. Constitutional: She is oriented to person, place, and time. She appears well-developed and well-nourished. No distress. HEENT:   Head: Normocephalic and atraumatic. Right Ear: Tympanic membrane, external ear and ear canal normal.   Left Ear: Tympanic membrane, external ear and ear canal normal.   Mouth/Throat: Oropharynx is clear and moist, and mucous membranes are normal.  There is no cervical adenopathy. Eyes: Conjunctivae and extraocular motions are normal. Pupils are equal, round, and reactive to light. Neck: Supple. No JVD present. Carotid bruit is not present. No mass and no thyromegaly present. Cardiovascular: Normal rate, regular rhythm, normal heart sounds and intact distal pulses. Exam reveals no gallop and no friction rub. No murmur heard. Pulmonary/Chest: Effort normal and breath sounds normal. No respiratory distress. She has no wheezes, rhonchi or rales. Abdominal: Soft, non-tender. Bowel sounds and aorta are normal. She exhibits no organomegaly, mass or bruit. Musculoskeletal: Normal range of motion, no synovitis. She exhibits no edema. Neurological: She is alert and oriented to person, place, and time. She has normal reflexes. No cranial nerve deficit. Coordination normal.   Skin: Skin is warm and dry. There is no rash or erythema. No suspicious lesions noted. Psychiatric: She has a normal mood and affect.  Her speech is normal and behavior is normal. Judgment, cognition and memory are normal.     Preventive Care:  Health Maintenance   Topic Date Due    Flu vaccine (1) 09/01/2021    Lipid screen  04/13/2022    A1C test (Diabetic or Prediabetic)  04/26/2022    Breast cancer screen  04/01/2023    DTaP/Tdap/Td vaccine (2 - Td or Tdap) 03/14/2024  Colon cancer screen colonoscopy  04/20/2024    Cervical cancer screen  04/13/2026    Pneumococcal 0-64 years Vaccine (2 of 2 - PPSV23) 03/23/2032    Shingles Vaccine  Completed    COVID-19 Vaccine  Completed    HIV screen  Completed    Hepatitis A vaccine  Aged Out    Hepatitis B vaccine  Aged Out    Hib vaccine  Aged Out    Meningococcal (ACWY) vaccine  Aged Out    Hepatitis C screen  Discontinued      Hx abnormal PAP: no  Sexual activity: none   Self-breast exams: yes  Last eye exam: 2020 - NEARSIGHTED  Exercise: no regular exercise  Seatbelt use: YES       Preventive plan of care for Urbano Gibson        9/29/2021           Preventive Measures Status       Recommendations for screening   Colon Cancer Screen   Last colonoscopy: 2021 Repeat every 3 years   Breast Cancer Screen  Last mammogram:  2021 Repeat yearly   Cervical Cancer Screen   Last PAP smear:  Repeat every 3 years   Osteoporosis Screen   Last DXA scan:  2021 Repeat every 2 years   Diabetes Screen  Glucose (mg/dL)   Date Value   05/05/2021 138 (H)    Repeat yearly   Cholesterol Screen  Lab Results   Component Value Date    CHOL 199 04/13/2021    TRIG 164 (H) 04/13/2021    HDL 33 (L) 04/13/2021    LDLCALC 133 (H) 04/13/2021    LDLDIRECT 144 (H) 03/14/2014    Test recommended and ordered   Aspirin for Cardiovascular Prevention   Yes Not indicated   Weight: There is no height or weight on file to calculate BMI.         Your BMI is between 18.5 and 24.9, which indicates that you are at a healthy weight   Living Will: No   Patient declined    Recommended Immunizations    Immunization History   Administered Date(s) Administered    COVID-19, Ambrocio Peter, PF, 30mcg/0.3mL 04/12/2021    Hepatitis B (Recombivax HB) 09/30/2014, 02/03/2015, 06/17/2015    Influenza Vaccine, unspecified formulation 01/10/2014, 12/23/2015    Influenza Virus Vaccine 01/02/2014, 09/30/2014, 12/23/2015, 09/10/2018    Influenza Whole 01/02/2014    Influenza, Intradermal, Preservative free 12/23/2015    Influenza, Intradermal, Quadrivalent, Preservative Free 10/17/2017    Influenza, Quadv, IM, (6 mo and older Fluzone, Flulaval, Fluarix and 3 yrs and older Afluria) 10/21/2016, 09/10/2018    Influenza, Anaya Newby, IM, PF (6 mo and older Fluzone, Flulaval, Fluarix, and 3 yrs and older Afluria) 10/20/2020    Pneumococcal Conjugate 13-valent ([id number]) 08/28/2017    Pneumococcal Polysaccharide ([id number]) 03/07/2018    Tdap (Boostrix, Adacel) 03/14/2014    Zoster Recombinant (Shingrix) 04/17/2019, 08/14/2019        Influenza vaccine:  administered today, risks and benefits discussed         Other Recommendations ·   See a dentist every 6 months  · Try to get at least 30 minutes of exercise 3-4 days per week  · Always wear a seat belt when traveling in a car  · When exposed to the sun, use a sunscreen that protects against both UVA and UVB radiation with an SPF of 30 or greater- reapply every 2 to 3 hours or after sweating, drying off with a towel, or swimming  · You need 7439-8566 mg of calcium and 3389-9493 IU of vitamin D per day- it is possible to meet your calcium requirement with diet alone, but a vitamin D supplement is usually necessary  · Have your blood pressure checked at least once every year                 Nimesh Hough was seen today for annual exam and other.     Diagnoses and all orders for this visit:    Annual physical exam  WELLNESS RECOMMENDATIONS REVIEWED WITH PT  · See a dentist every 6 months  · Try to get at least 30 minutes of exercise 3-4 days per week  · Always wear a seat belt when traveling in a car  · When exposed to the sun, use a sunscreen that protects against both UVA and UVB radiation with an SPF of 30 or greater- reapply every 2 to 3 hours or after sweating, drying off with a towel, or swimming  · You need 0387-0031 mg of calcium and 1417-0267 IU of vitamin D per day- it is possible to meet your calcium requirement with diet alone, but a vitamin D supplement is usually necessary  · Have your blood pressure checked at least once every year  Major depressive disorder with single episode, in full remission ([facility]  [address] AS PRESCRIBED  Hypercholesterolemia  -     Lipid Panel; Future  CONTINUE LIPITOR  Need for influenza vaccination  -     INFLUENZA, MDCK QUADV, 2 YRS AND OLDER, IM, PF, PREFILL SYR OR SDV, 0.5ML (FLUCELVAX QUADV, PF)            There are no diagnoses linked to this encounter.

## 2021-10-16 DIAGNOSIS — F32.1 CURRENT MODERATE EPISODE OF MAJOR DEPRESSIVE DISORDER, UNSPECIFIED WHETHER RECURRENT (HCC): ICD-10-CM

## 2021-10-16 DIAGNOSIS — E78.00 HYPERCHOLESTEROLEMIA: ICD-10-CM

## 2021-10-18 RX ORDER — ATORVASTATIN CALCIUM 10 MG/1
TABLET, FILM COATED ORAL
Qty: 90 TABLET | Refills: 0 | Status: SHIPPED | OUTPATIENT
Start: 2021-10-18 | End: 2021-12-29

## 2021-10-18 RX ORDER — ESCITALOPRAM OXALATE 10 MG/1
TABLET ORAL
Qty: 90 TABLET | Refills: 0 | Status: SHIPPED | OUTPATIENT
Start: 2021-10-18 | End: 2021-12-29

## 2021-12-28 ENCOUNTER — PATIENT MESSAGE (OUTPATIENT)
Dept: FAMILY MEDICINE CLINIC | Age: 56
End: 2021-12-28

## 2021-12-28 DIAGNOSIS — R05.9 COUGH: Primary | ICD-10-CM

## 2021-12-28 RX ORDER — BENZONATATE 100 MG/1
100 CAPSULE ORAL 3 TIMES DAILY PRN
Qty: 60 CAPSULE | Refills: 0 | Status: SHIPPED | OUTPATIENT
Start: 2021-12-28 | End: 2022-06-17

## 2021-12-28 NOTE — TELEPHONE ENCOUNTER
From: Bri Monday  To: Yeinfer Tolentino  Sent: 12/28/2021 6:03 AM EST  Subject: Terrible cough    Hello, I wasn't sure if I could receive cough medication without an appt. My symptoms are cough, fever off and on, achy body, lack of appetite, runny nose and the cough is the worse dry and wet. I have been sick since 12-24-21. I have not been around anyone outside of my home. I would love a tele call appt if needed before meds. I remember receiving a cough capsule like a see through small glycerin type capsule lol, so detailed I know lol. Just let me know.  I thought it would go away on its own shelilly

## 2021-12-29 ENCOUNTER — VIRTUAL VISIT (OUTPATIENT)
Dept: FAMILY MEDICINE CLINIC | Age: 56
End: 2021-12-29
Payer: COMMERCIAL

## 2021-12-29 DIAGNOSIS — J06.9 VIRAL URI: Primary | ICD-10-CM

## 2021-12-29 PROCEDURE — 99213 OFFICE O/P EST LOW 20 MIN: CPT | Performed by: FAMILY MEDICINE

## 2021-12-29 NOTE — PROGRESS NOTES
2021    TELEHEALTH EVALUATION -- Audio/Visual (During DFEJB-56 public health emergency)    HPI:    Tigre Minor (:  1965) has requested an audio/video evaluation for the following concern(s):    Low grade fever/ achy, runny nose which started around  -mild ? Allergies   Some left sided neck pain and some headache at beginning  Coughing up clear mucus  Started  with all of symptoms - sweating a lot. Coughing a lot -tight in chest/ throat  Pain posterior left neck under ear - no earache  A lot of sinus pressure. Whole body sore. No exposures known  Works from home - never going home. Has inhaler    Review of Systems    Prior to Visit Medications    Medication Sig Taking?  Authorizing Provider   benzonatate (TESSALON PERLES) 100 MG capsule Take 1 capsule by mouth 3 times daily as needed for Cough  SHON Herrera CNP   escitalopram (LEXAPRO) 10 MG tablet TAKE 1 TABLET BY MOUTH EVERY DAY  SHON Simmons CNP   atorvastatin (LIPITOR) 10 MG tablet TAKE 1 TABLET BY MOUTH EVERY DAY  SHON Simmons CNP   gabapentin (NEURONTIN) 300 MG capsule TAKE 1 CAPSULE BY MOUTH THREE TIMES DAILY FOR 30 DAYS  Historical Provider, MD   acyclovir (ZOVIRAX) 200 MG capsule TAKE 1 CAPSULE BY MOUTH THREE TIMES DAILY  SHON Herrera CNP   Cholecalciferol (VITAMIN D) 50 MCG (2000 UT) CAPS capsule Take 1 capsule by mouth daily  Historical Provider, MD   ferrous sulfate (IRON 325) 325 (65 Fe) MG tablet Take 325 mg by mouth daily (with breakfast)  Historical Provider, MD   albuterol sulfate HFA (PROAIR HFA) 108 (90 Base) MCG/ACT inhaler Inhale 2 puffs into the lungs every 4 hours as needed for Wheezing  SHON Obrien CNP       Social History     Tobacco Use    Smoking status: Never Smoker    Smokeless tobacco: Never Used   Vaping Use    Vaping Use: Never used   Substance Use Topics    Alcohol use: No    Drug use: No        PHYSICAL EXAMINATION:  [ INSTRUCTIONS:  \"[x]\" Indicates a positive item  \"[]\" Indicates a negative item  -- DELETE ALL ITEMS NOT EXAMINED]  Vital Signs: (As obtained by patient/caregiver or practitioner observation)    Blood pressure-  Heart rate-    Respiratory rate-    Temperature-  Pulse oximetry-     Constitutional: [x] Appears well-developed and well-nourished [x] No apparent distress      [] Abnormal-   Mental status  [x] Alert and awake  [] Oriented to person/place/time []Able to follow commands      Eyes:  EOM    []  Normal  [] Abnormal-  Sclera  []  Normal  [] Abnormal -         Discharge []  None visible  [] Abnormal -    HENT:   [x] Normocephalic, atraumatic. [] Abnormal   [] Mouth/Throat: Mucous membranes are moist.     External Ears [] Normal  [] Abnormal-     Neck: [] No visualized mass     Pulmonary/Chest: [x] Respiratory effort normal.  [] No visualized signs of difficulty breathing or respiratory distress        [] Abnormal-      Musculoskeletal:   [] Normal gait with no signs of ataxia         [] Normal range of motion of neck        [] Abnormal-       Neurological:        [x] No Facial Asymmetry (Cranial nerve 7 motor function) (limited exam to video visit)          [] No gaze palsy        [] Abnormal-         Skin:        [x] No significant exanthematous lesions or discoloration noted on facial skin         [] Abnormal-            Psychiatric:       [x] Normal Affect [] No Hallucinations        [] Abnormal-     Other pertinent observable physical exam findings-     ASSESSMENT/PLAN:   Diagnosis Orders   1.  Viral URI       Hydrate well  sx'atic tx w/ zyrtecD, afrin ns for few days/ saline irrigation  Tessalon perles/ mucinex dm for cough w/ albuterol prn  Worsening sxs dw/ pt that might indicate secondary bacterial infection - let us know if occurs  covid testing encouraged o/w isolate from others for at least 5 days then mask 5 days per cdc guidelines        [address], was evaluated through a synchronous (real-time) audio-video encounter. The patient (or guardian if applicable) is aware that this is a billable service. Verbal consent to proceed has been obtained within the past 12 months. The visit was conducted pursuant to the emergency declaration under the [facility], [address] [place] [address] authority and the [employer] General Act. Patient identification was verified, and a caregiver was present when appropriate. The patient was located in a state where the provider was credentialed to provide care. Total time spent on this encounter:     --Jean Claude Stanford MD on 12/29/2021 at 8:03 AM    An electronic signature was used to authenticate this note.

## 2022-03-23 DIAGNOSIS — F32.1 CURRENT MODERATE EPISODE OF MAJOR DEPRESSIVE DISORDER, UNSPECIFIED WHETHER RECURRENT (HCC): ICD-10-CM

## 2022-03-23 RX ORDER — ESCITALOPRAM OXALATE 10 MG/1
TABLET ORAL
Qty: 90 TABLET | Refills: 0 | Status: SHIPPED | OUTPATIENT
Start: 2022-03-23 | End: 2022-09-08

## 2022-04-18 DIAGNOSIS — Z12.31 ENCOUNTER FOR SCREENING MAMMOGRAM FOR MALIGNANT NEOPLASM OF BREAST: Primary | ICD-10-CM

## 2022-05-18 RX ORDER — ACYCLOVIR 200 MG/1
CAPSULE ORAL
Qty: 90 CAPSULE | Refills: 2 | Status: SHIPPED | OUTPATIENT
Start: 2022-05-18 | End: 2022-08-19

## 2022-05-19 ENCOUNTER — TELEPHONE (OUTPATIENT)
Dept: FAMILY MEDICINE CLINIC | Age: 57
End: 2022-05-19

## 2022-05-19 DIAGNOSIS — Z12.31 ENCOUNTER FOR SCREENING MAMMOGRAM FOR MALIGNANT NEOPLASM OF BREAST: Primary | ICD-10-CM

## 2022-05-19 NOTE — TELEPHONE ENCOUNTER
[facility] Scheduling needs a diagnostic breast Ultrasound Bilateral order put into epic.

## 2022-06-16 DIAGNOSIS — R05.9 COUGH: ICD-10-CM

## 2022-06-17 ENCOUNTER — TELEPHONE (OUTPATIENT)
Dept: FAMILY MEDICINE CLINIC | Age: 57
End: 2022-06-17

## 2022-06-17 RX ORDER — BENZONATATE 100 MG/1
CAPSULE ORAL
Qty: 60 CAPSULE | Refills: 0 | Status: SHIPPED | OUTPATIENT
Start: 2022-06-17 | End: 2022-09-08

## 2022-06-17 NOTE — TELEPHONE ENCOUNTER
We received a request from [facility] for Naproxen, no strength or instructions specified. LAST VISIT 09/29/2021 with jose for physical, NEXT VISIT none.

## 2022-08-16 ENCOUNTER — HOSPITAL ENCOUNTER (OUTPATIENT)
Dept: WOMENS IMAGING | Age: 57
Discharge: HOME | End: 2022-08-16
Payer: COMMERCIAL

## 2022-08-16 ENCOUNTER — HOSPITAL ENCOUNTER (OUTPATIENT)
Dept: ULTRASOUND IMAGING | Age: 57
Discharge: HOME | End: 2022-08-16
Payer: COMMERCIAL

## 2022-08-16 DIAGNOSIS — Z12.31 ENCOUNTER FOR SCREENING MAMMOGRAM FOR MALIGNANT NEOPLASM OF BREAST: ICD-10-CM

## 2022-08-16 PROCEDURE — 77066 DX MAMMO INCL CAD BI: CPT

## 2022-08-16 PROCEDURE — 76642 ULTRASOUND BREAST LIMITED: CPT

## 2022-08-19 RX ORDER — ACYCLOVIR 200 MG/1
CAPSULE ORAL
Qty: 90 CAPSULE | Refills: 2 | Status: SHIPPED | OUTPATIENT
Start: 2022-08-19

## 2022-08-29 ENCOUNTER — TELEPHONE (OUTPATIENT)
Dept: FAMILY MEDICINE CLINIC | Age: 57
End: 2022-08-29

## 2022-09-08 ENCOUNTER — OFFICE VISIT (OUTPATIENT)
Dept: FAMILY MEDICINE CLINIC | Age: 57
End: 2022-09-08
Payer: COMMERCIAL

## 2022-09-08 VITALS
SYSTOLIC BLOOD PRESSURE: 120 MMHG | OXYGEN SATURATION: 98 % | HEART RATE: 66 BPM | DIASTOLIC BLOOD PRESSURE: 78 MMHG | HEIGHT: 68 IN | WEIGHT: 172 LBS | BODY MASS INDEX: 26.07 KG/M2

## 2022-09-08 DIAGNOSIS — Z13.220 LIPID SCREENING: ICD-10-CM

## 2022-09-08 DIAGNOSIS — Z00.00 ANNUAL PHYSICAL EXAM: Primary | ICD-10-CM

## 2022-09-08 DIAGNOSIS — Z13.1 DIABETES MELLITUS SCREENING: ICD-10-CM

## 2022-09-08 DIAGNOSIS — E78.00 HYPERCHOLESTEROLEMIA: ICD-10-CM

## 2022-09-08 DIAGNOSIS — R73.9 BLOOD GLUCOSE ELEVATED: ICD-10-CM

## 2022-09-08 LAB
A/G RATIO: 1.6 (ref 1.1–2.2)
ALBUMIN SERPL-MCNC: 4.7 G/DL (ref 3.4–5)
ALP BLD-CCNC: 48 U/L (ref 40–129)
ALT SERPL-CCNC: 10 U/L (ref 10–40)
ANION GAP SERPL CALCULATED.3IONS-SCNC: 12 MMOL/L (ref 3–16)
AST SERPL-CCNC: 15 U/L (ref 15–37)
BILIRUB SERPL-MCNC: <0.2 MG/DL (ref 0–1)
BUN BLDV-MCNC: 10 MG/DL (ref 7–20)
CALCIUM SERPL-MCNC: 9.2 MG/DL (ref 8.3–10.6)
CHLORIDE BLD-SCNC: 107 MMOL/L (ref 99–110)
CHOLESTEROL, TOTAL: 259 MG/DL (ref 0–199)
CO2: 24 MMOL/L (ref 21–32)
CREAT SERPL-MCNC: 0.8 MG/DL (ref 0.6–1.1)
ESTIMATED AVERAGE GLUCOSE: 131.2 MG/DL
GFR AFRICAN AMERICAN: >60
GFR NON-AFRICAN AMERICAN: >60
GLUCOSE BLD-MCNC: 101 MG/DL (ref 70–99)
HBA1C MFR BLD: 6.2 %
HDLC SERPL-MCNC: 39 MG/DL (ref 40–60)
LDL CHOLESTEROL CALCULATED: ABNORMAL MG/DL
LDL CHOLESTEROL DIRECT: 163 MG/DL
POTASSIUM SERPL-SCNC: 4.2 MMOL/L (ref 3.5–5.1)
SODIUM BLD-SCNC: 143 MMOL/L (ref 136–145)
TOTAL PROTEIN: 7.6 G/DL (ref 6.4–8.2)
TRIGL SERPL-MCNC: 310 MG/DL (ref 0–150)
VLDLC SERPL CALC-MCNC: ABNORMAL MG/DL

## 2022-09-08 PROCEDURE — 99396 PREV VISIT EST AGE 40-64: CPT | Performed by: NURSE PRACTITIONER

## 2022-09-08 PROCEDURE — 36415 COLL VENOUS BLD VENIPUNCTURE: CPT | Performed by: NURSE PRACTITIONER

## 2022-09-08 RX ORDER — DENOSUMAB 60 MG/ML
60 INJECTION SUBCUTANEOUS ONCE
COMMUNITY

## 2022-09-08 RX ORDER — CALCIUM CARBONATE 500(1250)
500 TABLET ORAL DAILY
COMMUNITY

## 2022-09-08 ASSESSMENT — PATIENT HEALTH QUESTIONNAIRE - PHQ9
3. TROUBLE FALLING OR STAYING ASLEEP: 0
6. FEELING BAD ABOUT YOURSELF - OR THAT YOU ARE A FAILURE OR HAVE LET YOURSELF OR YOUR FAMILY DOWN: 0
9. THOUGHTS THAT YOU WOULD BE BETTER OFF DEAD, OR OF HURTING YOURSELF: 0
SUM OF ALL RESPONSES TO PHQ QUESTIONS 1-9: 0
1. LITTLE INTEREST OR PLEASURE IN DOING THINGS: 0
SUM OF ALL RESPONSES TO PHQ QUESTIONS 1-9: 0
10. IF YOU CHECKED OFF ANY PROBLEMS, HOW DIFFICULT HAVE THESE PROBLEMS MADE IT FOR YOU TO DO YOUR WORK, TAKE CARE OF THINGS AT HOME, OR GET ALONG WITH OTHER PEOPLE: 0
8. MOVING OR SPEAKING SO SLOWLY THAT OTHER PEOPLE COULD HAVE NOTICED. OR THE OPPOSITE, BEING SO FIGETY OR RESTLESS THAT YOU HAVE BEEN MOVING AROUND A LOT MORE THAN USUAL: 0
7. TROUBLE CONCENTRATING ON THINGS, SUCH AS READING THE NEWSPAPER OR WATCHING TELEVISION: 0
SUM OF ALL RESPONSES TO PHQ QUESTIONS 1-9: 0
2. FEELING DOWN, DEPRESSED OR HOPELESS: 0
5. POOR APPETITE OR OVEREATING: 0
SUM OF ALL RESPONSES TO PHQ9 QUESTIONS 1 & 2: 0
SUM OF ALL RESPONSES TO PHQ QUESTIONS 1-9: 0
4. FEELING TIRED OR HAVING LITTLE ENERGY: 0

## 2022-09-08 NOTE — PROGRESS NOTES
History and Physical      Rick Draper  YOB: 1965    Date of Service:  9/8/2022    Chief Complaint:   Jaime Lance is a 56 y.o. female who presents for complete physical examination. HPI: annual exam she is fasting today  No medical concerns  Endocrinologist Dr Campa Patient - [place] Eng adenoma      Wt Readings from Last 3 Encounters:   09/29/21 162 lb 9.6 oz (73.8 kg)   07/22/21 162 lb (73.5 kg)   05/17/21 162 lb (73.5 kg)     BP Readings from Last 3 Encounters:   09/29/21 108/64   07/22/21 102/62   05/05/21 (!) 108/59       Patient Active Problem List   Diagnosis    Hyperlipidemia    Anemia    Vitamin D deficiency    Hypertriglyceridemia    Asthma    Pituitary adenoma with extrasellar extension ([address]    Avascular necrosis of femur ([address]    Left knee pain    Avascular necrosis of left femur ([address]       Allergies   Allergen Reactions    Fosamax [Alendronate] Other (See Comments)     COUGH    Contrast [Iodides]      Rash all over body, low grade fever, body aches. Went to ER next day for treatment. Ioversol Rash     No outpatient medications have been marked as taking for the 9/8/22 encounter (Appointment) with SHON Madden CNP. Past Medical History:   Diagnosis Date    Anemia     iron defieciency    Asthma     Brain tumor ([address]     surgery for brain tumor 3/15/18    Hemorrhagic cyst of ovary 2007    Left    Hyperlipidemia     Tubular adenoma of colon 03/02/2018    COLONOSCOPY, DR CLAUDINE KHAN, TUBULAR ADENOMAS ASCENDING COLON, 3 MM, 4 MM, 5 MM REMOVED. Past Surgical History:   Procedure Laterality Date    BREAST ENHANCEMENT SURGERY      COLONOSCOPY  03/02/2018    COLONOSCOPY, DR CLAUDINE KHAN, 3 MM, 4 MM, 5 MM ASCENDING COLON POLYP REMOVED. LIMITED PREP. REPEAT 3-6 MONTHS.     EPIGASTRIC HERNIA REPAIR  10/30/2017    with mesh     OTHER SURGICAL HISTORY Left     lump removed from under left arm pit in her 30's    PITUITARY SURGERY N/A     brain surgery    TOTAL HIP ARTHROPLASTY Left 5/4/2021    LEFT ANTERIOR TOTAL HIP REPLACEMENT WITH C-ARM performed by Gio Hicks MD at [address]  12/06/2016    and umbilectomy     Family History   Problem Relation Age of Onset    High Cholesterol Mother     Diabetes Mother     High Cholesterol Father     Diabetes Father     Ovarian Cancer Daughter     Breast Cancer Maternal Grandmother     Cancer Maternal Grandmother         colon    Cancer Paternal Grandmother         colon cancer    Breast Cancer Maternal Cousin     Breast Cancer Maternal Cousin     Breast Cancer Maternal Cousin      Social History     Socioeconomic History    Marital status: Legally      Spouse name: Not on file    Number of children: Not on file    Years of education: Not on file    Highest education level: Not on file   Occupational History    Not on file   Tobacco Use    Smoking status: Never    Smokeless tobacco: Never   Vaping Use    Vaping Use: Never used   Substance and Sexual Activity    Alcohol use: No    Drug use: No    Sexual activity: Not Currently   Other Topics Concern    Not on file   Social History Narrative    ** Merged History Encounter **    No exercise program. In PT for left hip x 3 wks. 10/5/20. Social Determinants of Health     Financial Resource Strain: High Risk    Difficulty of Paying Living Expenses: Hard   Food Insecurity: Food Insecurity Present    Worried About Running Out of Food in the Last Year: Often true    Ran Out of Food in the Last Year: Often true   Transportation Needs: Unmet Transportation Needs    Lack of Transportation (Medical): Yes    Lack of Transportation (Non-Medical): Yes   Physical Activity: Not on file   Stress: Not on file   Social Connections: Not on file   Intimate Partner Violence: Not on file   Housing Stability: Not on file       Review of Systems:  A comprehensive review of systems was negative.       Physical Exam:   There were no vitals filed for this visit. There is no height or weight on file to calculate BMI. Constitutional: She is oriented to person, place, and time. She appears well-developed and well-nourished. No distress. HEENT:   Head: Normocephalic and atraumatic. Right Ear: Tympanic membrane, external ear and ear canal normal.   Left Ear: Tympanic membrane, external ear and ear canal normal.   Mouth/Throat: Oropharynx is clear and moist, and mucous membranes are normal.  There is no cervical adenopathy. Eyes: Conjunctivae and extraocular motions are normal. Pupils are equal, round, and reactive to light. Neck: Supple. No JVD present. Carotid bruit is not present. No mass and no thyromegaly present. Cardiovascular: Normal rate, regular rhythm, normal heart sounds and intact distal pulses. Exam reveals no gallop and no friction rub. No murmur heard. Pulmonary/Chest: Effort normal and breath sounds normal. No respiratory distress. She has no wheezes, rhonchi or rales. Abdominal: Soft, non-tender. Bowel sounds and aorta are normal. She exhibits no organomegaly, mass or bruit. Musculoskeletal: Normal range of motion, no synovitis. She exhibits no edema. Neurological: She is alert and oriented to person, place, and time. She has normal reflexes. No cranial nerve deficit. Coordination normal.   Skin: Skin is warm and dry. There is no rash or erythema. No suspicious lesions noted. Psychiatric: She has a normal mood and affect.  Her speech is normal and behavior is normal. Judgment, cognition and memory are normal.     Preventive Care:  Health Maintenance   Topic Date Due    COVID-19 Vaccine (2 - Pfizer series) 05/03/2021    Depression Monitoring  04/13/2022    A1C test (Diabetic or Prediabetic)  04/26/2022    Flu vaccine (1) 09/01/2022    Lipids  09/29/2022    DTaP/Tdap/Td vaccine (2 - Td or Tdap) 03/14/2024    Colorectal Cancer Screen  04/20/2024    Breast cancer screen  08/16/2024    Cervical cancer screen  04/13/2026    Pneumococcal 0-64 years Vaccine (3 - PPSV23 or PCV20) 03/23/2032    Shingles vaccine  Completed    HIV screen  Completed    Hepatitis A vaccine  Aged Out    Hepatitis B vaccine  Aged Out    Hib vaccine  Aged Out    Meningococcal (ACWY) vaccine  Aged Out    Hepatitis C screen  Discontinued      Hx abnormal PAP: no  Sexual activity: single partner, contraception - none   Self-breast exams: yes  Last eye exam: 2022,abnormal - near and farsighted   Exercise: walks 2 time(s) per week  Seatbelt use: yes       Preventive plan of care for Urbano Gibson        9/8/2022           Preventive Measures Status       Recommendations for screening   Colon Cancer Screen   Last colonoscopy: 2021 Repeat every 3 years   Breast Cancer Screen  Last mammogram:  2022 Repeat yearly   Cervical Cancer Screen   Last PAP smear: 2021 Repeat every 3 years   Osteoporosis Screen   Last DXA scan: 2021 Repeat every 2 years   Diabetes Screen  Glucose (mg/dL)   Date Value   05/05/2021 138 (H)    This test is not clinically indicated   Cholesterol Screen  Lab Results   Component Value Date    CHOL 236 (H) 09/29/2021    TRIG 238 (H) 09/29/2021    HDL 42 09/29/2021    LDLCALC 146 (H) 09/29/2021    LDLDIRECT 144 (H) 03/14/2014    Test recommended and ordered   Aspirin for Cardiovascular Prevention   No Not indicated   Weight: There is no height or weight on file to calculate BMI.         Your BMI is 25 or greater, which indicates that you are overweight   Living Will: No   Patient declined    Recommended Immunizations    Immunization History   Administered Date(s) Administered    COVID-19, PFIZER PURPLE top, DILUTE for use, (age 15 y+), 30mcg/0.3mL 04/12/2021    Hepatitis B (Recombivax HB) 09/30/2014, 02/03/2015, 06/17/2015    INFLUENZA, INTRADERMAL, QUADRIVALENT, PRESERVATIVE FREE 10/17/2017    Influenza Vaccine, unspecified formulation 01/10/2014, 12/23/2015    Influenza Virus Vaccine 01/02/2014, 09/30/2014, 12/23/2015, 09/10/2018    Influenza Whole 01/02/2014    Influenza, AFLURIA (age 1 yrs+), FLUZONE, (age 10 mo+), MDV, 0.5mL 10/21/2016, 09/10/2018    Influenza, FLUARIX, FLULAVAL, FLUZONE (age 10 mo+) AND AFLURIA, (age 1 y+), PF, 0.5mL 10/20/2020    Influenza, FLUCELVAX, (age 10 mo+), MDCK, PF, 0.5mL 09/29/2021    Influenza, Intradermal, Preservative free 12/23/2015    Pneumococcal Conjugate 13-valent ([id number]) 08/28/2017    Pneumococcal Polysaccharide ([id number]) 03/07/2018    Tdap (Boostrix, Adacel) 03/14/2014    Zoster Recombinant (Shingrix) 04/17/2019, 08/14/2019        Encouraged to get covid booster         Other Recommendations   See an eye specialist every 1 years  See a dentist every 6 months  Try to get at least 30 minutes of exercise 3-4 days per week  You need 0921-4831 mg of calcium and 9531-9334 IU of vitamin D per day- it is possible to meet your calcium requirement with diet alone, but a vitamin D supplement is usually necessary  Have your blood pressure checked at least once every year                 Pepe Sim was seen today for annual exam.    Diagnoses and all orders for this visit:    Annual physical exam  Wellness recommendations reviewed with pt  See an eye specialist every 1 years  See a dentist every 6 months  Try to get at least 30 minutes of exercise 3-4 days per week  You need 8352-5456 mg of calcium and 5657-9312 IU of vitamin D per day- it is possible to meet your calcium requirement with diet alone, but a vitamin D supplement is usually necessary  Have your blood pressure checked at least once every year      Hypercholesterolemia  -     Lipid Panel; Future  Continue Lipitor    Diabetes mellitus screening  -     Comprehensive Metabolic Panel;  Future    Blood glucose elevated  -     Hemoglobin A1C; Future

## 2022-09-11 RX ORDER — FENOFIBRATE 48 MG/1
48 TABLET, COATED ORAL DAILY
Qty: 90 TABLET | Refills: 0 | Status: SHIPPED | OUTPATIENT
Start: 2022-09-11 | End: 2022-09-11 | Stop reason: CLARIF

## 2022-09-14 DIAGNOSIS — F32.1 CURRENT MODERATE EPISODE OF MAJOR DEPRESSIVE DISORDER, UNSPECIFIED WHETHER RECURRENT (HCC): ICD-10-CM

## 2022-09-14 RX ORDER — ESCITALOPRAM OXALATE 10 MG/1
TABLET ORAL
Qty: 90 TABLET | Refills: 0 | OUTPATIENT
Start: 2022-09-14

## 2022-09-15 DIAGNOSIS — E78.00 HYPERCHOLESTEROLEMIA: ICD-10-CM

## 2022-09-15 RX ORDER — ATORVASTATIN CALCIUM 10 MG/1
TABLET, FILM COATED ORAL
Qty: 90 TABLET | Refills: 0 | Status: SHIPPED | OUTPATIENT
Start: 2022-09-15

## 2022-10-17 ENCOUNTER — OFFICE VISIT (OUTPATIENT)
Dept: FAMILY MEDICINE CLINIC | Age: 57
End: 2022-10-17

## 2022-10-17 VITALS
HEART RATE: 64 BPM | DIASTOLIC BLOOD PRESSURE: 84 MMHG | HEIGHT: 68 IN | WEIGHT: 169 LBS | TEMPERATURE: 98.3 F | SYSTOLIC BLOOD PRESSURE: 124 MMHG | BODY MASS INDEX: 25.61 KG/M2 | RESPIRATION RATE: 20 BRPM

## 2022-10-17 DIAGNOSIS — R19.7 NAUSEA VOMITING AND DIARRHEA: ICD-10-CM

## 2022-10-17 DIAGNOSIS — R05.1 ACUTE COUGH: ICD-10-CM

## 2022-10-17 DIAGNOSIS — R11.2 NAUSEA VOMITING AND DIARRHEA: ICD-10-CM

## 2022-10-17 DIAGNOSIS — H10.31 ACUTE BACTERIAL CONJUNCTIVITIS OF RIGHT EYE: ICD-10-CM

## 2022-10-17 DIAGNOSIS — K52.9 ACUTE GASTROENTERITIS: Primary | ICD-10-CM

## 2022-10-17 RX ORDER — ONDANSETRON 4 MG/1
4 TABLET, FILM COATED ORAL 3 TIMES DAILY PRN
Qty: 30 TABLET | Refills: 0 | Status: SHIPPED | OUTPATIENT
Start: 2022-10-17

## 2022-10-17 RX ORDER — DICYCLOMINE HYDROCHLORIDE 10 MG/1
10 CAPSULE ORAL 4 TIMES DAILY PRN
Qty: 60 CAPSULE | Refills: 0 | Status: SHIPPED | OUTPATIENT
Start: 2022-10-17

## 2022-10-17 RX ORDER — POLYMYXIN B SULFATE AND TRIMETHOPRIM 1; 10000 MG/ML; [USP'U]/ML
1 SOLUTION OPHTHALMIC
Qty: 1 EACH | Refills: 0 | Status: SHIPPED | OUTPATIENT
Start: 2022-10-17 | End: 2022-10-27

## 2022-10-17 RX ORDER — ASCORBIC ACID 250 MG
TABLET ORAL
COMMUNITY

## 2022-10-17 SDOH — ECONOMIC STABILITY: TRANSPORTATION INSECURITY
IN THE PAST 12 MONTHS, HAS THE LACK OF TRANSPORTATION KEPT YOU FROM MEDICAL APPOINTMENTS OR FROM GETTING MEDICATIONS?: NO

## 2022-10-17 SDOH — ECONOMIC STABILITY: FOOD INSECURITY: WITHIN THE PAST 12 MONTHS, YOU WORRIED THAT YOUR FOOD WOULD RUN OUT BEFORE YOU GOT MONEY TO BUY MORE.: NEVER TRUE

## 2022-10-17 SDOH — ECONOMIC STABILITY: TRANSPORTATION INSECURITY
IN THE PAST 12 MONTHS, HAS LACK OF TRANSPORTATION KEPT YOU FROM MEETINGS, WORK, OR FROM GETTING THINGS NEEDED FOR DAILY LIVING?: NO

## 2022-10-17 SDOH — ECONOMIC STABILITY: FOOD INSECURITY: WITHIN THE PAST 12 MONTHS, THE FOOD YOU BOUGHT JUST DIDN'T LAST AND YOU DIDN'T HAVE MONEY TO GET MORE.: NEVER TRUE

## 2022-10-17 ASSESSMENT — ENCOUNTER SYMPTOMS
SHORTNESS OF BREATH: 0
EYE DISCHARGE: 0
SINUS PAIN: 0
COUGH: 1
DIARRHEA: 0
ABDOMINAL PAIN: 1
CHEST TIGHTNESS: 0
SINUS PRESSURE: 0
NAUSEA: 1
COLOR CHANGE: 0
ABDOMINAL DISTENTION: 0
BACK PAIN: 0
CONSTIPATION: 0
VOMITING: 1

## 2022-10-17 ASSESSMENT — SOCIAL DETERMINANTS OF HEALTH (SDOH): HOW HARD IS IT FOR YOU TO PAY FOR THE VERY BASICS LIKE FOOD, HOUSING, MEDICAL CARE, AND HEATING?: NOT HARD AT ALL

## 2022-10-17 NOTE — PATIENT INSTRUCTIONS
Complete living will and healthcare power of  packet with 2 witnesses unrelated to you or a notary and bring copy back to office for medical file and give copies to healthcare power of attorneys

## 2022-10-17 NOTE — PROGRESS NOTES
Date of Service:  10/17/2022    Deric Kendall (:  1965) is a 56 y.o. female, here for evaluation of the following medical concerns:    Chief Complaint   Patient presents with    Cough     Sx since last night, cough, abdominal pain, fatigue         HPI    Cough  Pt started with a cough, abd pain and fatigue. Just started last night. A wave came over her. Having some weakness, nausea. Vomited twice yesterday. No fever or headache. COVID negative here at office today. 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 Not required to wear masks, was fine all through COVID, has never had it. Works mostly with elderly population. Cough just started today. Has not vomited today but has not yet eaten anything. Pt still having dry heaves. Vomiting liquids she was trying to take in. Pt was sent home from work today. Said she woke up feeling damp all over, likely spiked fever overnight. Denies constipation. + diarrhea. Diarrhea also started late last night. Diarrhea is yellow. One eye this morning was crusted over- right eye. 24 hour diet recall- chicken noodle soup, smoothie drink, dry toast, Vegetable soup. Says having the smoothie only in the morning was her normal and by evening she wasn't feeling well. The day before she had meatloaf, mashed potatoes and gravy and dinner rolls and smoked turkey sandwich and fruit and yogurt and smoothie and seaman. Quite a few sick recently but they have worn their masks- coughing and sneezing. Review of Systems   Constitutional:  Positive for diaphoresis and fatigue. Negative for activity change, appetite change, fever and unexpected weight change. HENT:  Negative for congestion, ear pain, sinus pressure and sinus pain. Eyes:  Negative for discharge and visual disturbance. Respiratory:  Positive for cough. Negative for chest tightness and shortness of breath.     Cardiovascular:  Negative for chest pain, palpitations and leg swelling. Gastrointestinal:  Positive for abdominal pain, nausea and vomiting. Negative for abdominal distention, constipation and diarrhea. Endocrine: Negative for cold intolerance, heat intolerance, polydipsia, polyphagia and polyuria. Genitourinary:  Negative for decreased urine volume, difficulty urinating, dysuria, flank pain, frequency and urgency. Musculoskeletal:  Negative for arthralgias, back pain, gait problem, joint swelling, myalgias and neck pain. Skin:  Negative for color change, rash and wound. Allergic/Immunologic: Negative for food allergies and immunocompromised state. Neurological:  Negative for dizziness, tremors, speech difficulty, weakness, light-headedness, numbness and headaches. Hematological:  Negative for adenopathy. Does not bruise/bleed easily. Psychiatric/Behavioral:  Negative for confusion, decreased concentration, self-injury, sleep disturbance and suicidal ideas. The patient is not nervous/anxious. Prior to Visit Medications    Medication Sig Taking?  Authorizing Provider   FIBER PO Take by mouth Yes Historical Provider, MD   cyanocobalamin 1000 MCG tablet Take by mouth Yes Historical Provider, MD   ascorbic acid (VITAMIN C) 250 MG tablet Take by mouth Yes Historical Provider, MD   trimethoprim-polymyxin b (POLYTRIM) 41481-4.1 UNIT/ML-% ophthalmic solution Place 1 drop into the right eye every 4 hours (while awake) for 10 days Yes SHON Szymanski CNP   ondansetron (ZOFRAN) 4 MG tablet Take 1 tablet by mouth 3 times daily as needed for Nausea or Vomiting Yes SHON Szymanski CNP   dicyclomine (BENTYL) 10 MG capsule Take 1 capsule by mouth 4 times daily as needed (abdominal cramping) Yes SHON Szymanski CNP   atorvastatin (LIPITOR) 10 MG tablet TAKE 1 TABLET BY MOUTH EVERY DAY Yes SHON Andrew CNP   calcium carbonate (OSCAL) 500 MG TABS tablet Take 500 mg by mouth daily Yes Historical Provider, MD   denosumab (PROLIA) 60 MG/ML SOSY SC injection Inject 60 mg into the skin once Yes Historical Provider, MD   acyclovir (ZOVIRAX) 200 MG capsule TAKE 1 CAPSULE BY MOUTH THREE TIMES DAILY Yes SHON Morales CNP   Cholecalciferol (VITAMIN D) 50 MCG (2000 UT) CAPS capsule Take 1 capsule by mouth daily Yes Historical Provider, MD   ferrous sulfate (IRON 325) 325 (65 Fe) MG tablet Take 325 mg by mouth daily (with breakfast) Yes Historical Provider, MD   albuterol sulfate HFA (PROAIR HFA) 108 (90 Base) MCG/ACT inhaler Inhale 2 puffs into the lungs every 4 hours as needed for Wheezing Yes SHON Mckoy CNP        Social History     Tobacco Use    Smoking status: Never    Smokeless tobacco: Never   Substance Use Topics    Alcohol use: No        Vitals:    10/17/22 1053   BP: 124/84   Site: Left Upper Arm   Position: Sitting   Cuff Size: Medium Adult   Pulse: 64   Resp: 20   Temp: 98.3 °F (36.8 °C)   TempSrc: Temporal   Weight: 169 lb (76.7 kg)   Height: 5' 8\" (1.727 m)     Estimated body mass index is 25.7 kg/m² as calculated from the following:    Height as of this encounter: 5' 8\" (1.727 m). Weight as of this encounter: 169 lb (76.7 kg). Physical Exam  Vitals reviewed. Constitutional:       General: She is awake. Appearance: Normal appearance. She is well-developed and well-groomed. She is not ill-appearing. HENT:      Head: Normocephalic and atraumatic. Right Ear: Hearing, tympanic membrane, ear canal and external ear normal.      Left Ear: Hearing, tympanic membrane, ear canal and external ear normal.      Nose: Nose normal.      Mouth/Throat:      Lips: Pink. Mouth: Mucous membranes are moist.      Pharynx: Oropharynx is clear. Eyes:      General: Lids are normal.      Extraocular Movements: Extraocular movements intact. Conjunctiva/sclera: Conjunctivae normal.      Pupils: Pupils are equal, round, and reactive to light.    Neck: Thyroid: No thyromegaly. Vascular: No carotid bruit. Cardiovascular:      Rate and Rhythm: Normal rate. Pulses:           Carotid pulses are 2+ on the right side and 2+ on the left side. Radial pulses are 2+ on the right side and 2+ on the left side. Posterior tibial pulses are 2+ on the right side and 2+ on the left side. Heart sounds: Normal heart sounds, S1 normal and S2 normal. No murmur heard. Pulmonary:      Effort: Pulmonary effort is normal.      Breath sounds: Normal breath sounds. Abdominal:      General: Bowel sounds are normal. There is no abdominal bruit. Palpations: Abdomen is soft. Tenderness: There is abdominal tenderness in the right upper quadrant, right lower quadrant, left upper quadrant and left lower quadrant. Genitourinary:     Comments: Deferred  Musculoskeletal:         General: Normal range of motion. Cervical back: Full passive range of motion without pain, normal range of motion and neck supple. Right lower leg: No edema. Left lower leg: No edema. Lymphadenopathy:      Head:      Right side of head: No submental, submandibular, tonsillar, preauricular, posterior auricular or occipital adenopathy. Left side of head: No submental, submandibular, tonsillar, preauricular, posterior auricular or occipital adenopathy. Cervical: No cervical adenopathy. Right cervical: No superficial, deep or posterior cervical adenopathy. Left cervical: No superficial, deep or posterior cervical adenopathy. Upper Body:      Right upper body: No supraclavicular adenopathy. Left upper body: No supraclavicular adenopathy. Skin:     General: Skin is warm and dry. Capillary Refill: Capillary refill takes less than 2 seconds. Neurological:      General: No focal deficit present. Mental Status: She is alert and oriented to person, place, and time. Mental status is at baseline. Sensory: Sensation is intact. Motor: Motor function is intact. Coordination: Coordination is intact. Gait: Gait is intact. Psychiatric:         Attention and Perception: Attention and perception normal.         Mood and Affect: Mood and affect normal.         Speech: Speech normal.         Behavior: Behavior normal. Behavior is cooperative. Thought Content: Thought content normal.         Cognition and Memory: Cognition and memory normal.         Judgment: Judgment normal.       ASSESSMENT/PLAN:  1. Acute gastroenteritis  -     ondansetron (ZOFRAN) 4 MG tablet; Take 1 tablet by mouth 3 times daily as needed for Nausea or Vomiting, Disp-30 tablet, R-0Normal  -     dicyclomine (BENTYL) 10 MG capsule; Take 1 capsule by mouth 4 times daily as needed (abdominal cramping), Disp-60 capsule, R-0Normal   Discussed likely viral, no abx at this time   Treat symptoms- discussed meds   Push fluids, eat what can be tolerated, start light/bland   Use OTC tylenol/motrin for pain/body aches/fatigue and fevers  2. Acute cough  -     POCT COVID-19 & Influenza Combo   Negative COVID and flu  3. Nausea vomiting and diarrhea  -     ondansetron (ZOFRAN) 4 MG tablet; Take 1 tablet by mouth 3 times daily as needed for Nausea or Vomiting, Disp-30 tablet, R-0Normal  -     dicyclomine (BENTYL) 10 MG capsule; Take 1 capsule by mouth 4 times daily as needed (abdominal cramping), Disp-60 capsule, R-0Normal   Discussed viral illness, see above  4.  Acute bacterial conjunctivitis of right eye  -     trimethoprim-polymyxin b (POLYTRIM) 02071-9.1 UNIT/ML-% ophthalmic solution; Place 1 drop into the right eye every 4 hours (while awake) for 10 days, Disp-1 each, R-0Normal    Discussed use in left eye if developed symptoms   Warm compress to eye if crusted shut   Discussed good hand hygiene, change pillow cases   Note for work x 24 hours at least, work wants her to take off all week but she requests off just through Wednesday    Care Gaps Addressed  COVID vaccine recommended  Flu vaccine recommended       I have reviewed patient's pertinent medical history, relevant laboratory and imaging studies, and past/future health maintenance. Discussed with the patient the importance of adhering to their current medication regimen as directed. Advised the patient that they should continue to work on eating a healthy balanced diet and staying active by exercising within their personal limits. Orders as listed above. Patient was advised to keep future appointments with their respective specialty care team(s). Patient had the opportunity to ask questions, all of which were answered to the best of my ability and with patient satisfaction. Patient understands and is agreeable with the care plan following today's visit. Patient is to schedule an appointment for any new or worsening symptoms. Go to ER for significant shortness of breath, chest pain, or uncontrolled pain or fever. I discussed with patient the risk and benefits of any medications that were prescribed today. I verified that the patient understands their medications, labs, and/or procedures. The patient is doing well with current medication regimen and does not have any barriers to adherence. The patient's self-management abilities are good. Return in about 11 months (around 9/17/2023) for Physical Exam and Labs. An electronic signature was used to authenticate this note.     --SHON Vera - CNP on 10/17/2022 at 11:20 AM

## 2022-10-17 NOTE — LETTER
[id number] [facility] [address]  Phone: [phone]  Fax: [phone]    SHON Carson CNP        October 17, 2022     Patient: Shakira Stanley   YOB: 1965   Date of Visit: 10/17/2022       To Whom It May Concern: It is my medical opinion that Shakira Stanley may return to work on 10/20/22. If you have any questions or concerns, please don't hesitate to call.     Sincerely,        SHON Carson CNP

## 2022-10-18 DIAGNOSIS — F32.1 CURRENT MODERATE EPISODE OF MAJOR DEPRESSIVE DISORDER, UNSPECIFIED WHETHER RECURRENT (HCC): ICD-10-CM

## 2022-10-18 RX ORDER — ESCITALOPRAM OXALATE 10 MG/1
TABLET ORAL
Qty: 90 TABLET | Refills: 0 | OUTPATIENT
Start: 2022-10-18

## 2022-10-18 NOTE — TELEPHONE ENCOUNTER
LV 9/8/22 WITH CC FOR PHYSICAL NV NONE    escitalopram (LEXAPRO) 10 MG tablet (Discontinued) 90 tablet 0 3/23/2022 9/8/2022    Sig: TAKE 1 TABLET BY MOUTH EVERY DAY    Patient not taking: Reported on 9/8/2022        Sent to pharmacy as: Escitalopram Oxalate 10 MG Oral Tablet (Penny Pimentel)    Reason for Discontinue: LIST CLEANUP    Cosign for Ordering: Accepted by SHON Donis CNP on 3/23/2022 11:12 AM    E-Prescribing Status: Receipt confirmed by pharmacy (3/23/2022 10:45 AM EDT)

## 2022-11-17 ENCOUNTER — TELEPHONE (OUTPATIENT)
Dept: FAMILY MEDICINE CLINIC | Age: 57
End: 2022-11-17

## 2022-11-17 DIAGNOSIS — Z12.31 ENCOUNTER FOR SCREENING MAMMOGRAM FOR MALIGNANT NEOPLASM OF BREAST: Primary | ICD-10-CM

## 2022-11-17 NOTE — TELEPHONE ENCOUNTER
Order placed, matched order of completed.   Ultrasound bilateral order unavailable    --SHON James - CNP

## 2022-11-29 ENCOUNTER — PATIENT MESSAGE (OUTPATIENT)
Dept: FAMILY MEDICINE CLINIC | Age: 57
End: 2022-11-29

## 2022-11-29 ENCOUNTER — TELEMEDICINE (OUTPATIENT)
Dept: FAMILY MEDICINE CLINIC | Age: 57
End: 2022-11-29
Payer: COMMERCIAL

## 2022-11-29 DIAGNOSIS — J06.9 VIRAL URI: Primary | ICD-10-CM

## 2022-11-29 PROCEDURE — 99213 OFFICE O/P EST LOW 20 MIN: CPT | Performed by: NURSE PRACTITIONER

## 2022-11-29 NOTE — PROGRESS NOTES
Reyna Jara (:  1965) is a Established patient, here for evaluation of the following:    Brooke Koyanagi was seen today for pharyngitis. Diagnoses and all orders for this visit:    Viral URI  -     POCT COVID-19 & Influenza A/B-negative  Instructed to treat the symptoms  Instructions for Respiratory Infections (SAVE THIS SHEET)   For the first 7-14 days of symptoms follow instructions below, even before being seen in the office or even during treatment with antibiotics, until symptom free. 1. Water: Drink 1 ounce of water for every 2 pounds of body weight for adults need 64 Ounces of water per day. This will loosen mucus in the head and chest & improve the weak feeling of dehydration, allow the body to get germ fighting resources to the infection. Half can be juice or sugar free Crystal Light. Don't count drinks with caffeine or carbonation. Infants can have Pedialyte liquid or freezer pops. Avoid salt if you have high Blood Pressure, swelling in the feet or ankles or have heart problems. 2. Humidity: Humidify the air to 35-50% ( or until the windows fog over slightly). Can use a humidifier, vaporizer, boil water on the stove or put a coffee can full of water on the heater vents. This will loosen mucus from infections and allergies. 3. Sleep: Get 8-10 hours a night and rest during the evening after work or school. If you have trouble sleeping, adults can take Melatonin 3mg up to 3 tabs at bedtime ( not for children or pregnant women). If Mono is suspected then sleep during 9PM to 9AM time span (if possible.)   4. Cough: Take cough medicines with Guaifenesin ( to loosen chest or head congestion) and Dextromethorphan ( to decrease excess cough). Robitussin D.M. Syrup every 4-6 hrs or Mucinex D.M. Pills twice a day. Use the pediatric formulations for children over 6 months making sure they are alcohol & sugar free for children, pregnant women, and diabetics. 5. Pain And Fevers:  Take Acetaminophen ( Tylenol) for fevers, aches, and headaches. 2-500 mg every 8 hours for adults. Appropriate doses at bedtime for children may help them sleep better. If pregnant take 1 -500 mg. (Tylenol) every 8 hours as needed. Ibuprofen may be used if not pregnant, but should be given with food to avoid nausea. Avoid Ibuprofen if you have high blood pressure, CHF, or kidney problems. 6.Gargle: Gargle in the back of the throat with the head tilted back and to the sides with a strong mouthwash ( Listerine or Scope) after meals and at bedtime at least 4 -5 times a day. This helps kill bacteria and viruses in the back of the throat and will shorten the duration and decrease the severity of your symptoms: sore throat, cough, ear popping,/ear pain, and possibly dizziness. 7. Smoking: Avoid smoking or exposure to second hand smoke. 8. Zinc: Zinc lozenges such as Cold Boris, or Basic will help shorten the duration and lessen symptoms such as sore throat, cough, nasal congestion, runny nose, and post nasal drip. Use 1 lozenge every 2-4 hours ( after meals if stomach is sensitive). Children can use 10-15 mg. Or less 3-4 times a day or Zinc lollypops. In pregnancy limit to 50-60 mg. A day for 7 days as prenatals have Zinc also. With diarrhea use zinc pills 50 mg 1/2 to 1 pill 2x/day. 9. Vitamins: Vitamin C 500 mg. With breakfast and dinner. Children and pregnant women should drink citrus juices. This speeds healing and strengthens immune system. 10. Chest Symptoms: Vicks Vapor rub to the chest at bedtime. 11. Decongestants: Avoid all decongestants and antihistamine cold preparations in children. Try all of the above starting with day 1 of symptoms. If Strep throat symptoms appear call to be seen in the office as soon as possible and don't gargle on that day. Newborns, infants, or anyone with earaches or influenza may need to be seen quickly.  Adults with fevers over 103 degrees or shortness of breath should call the office immediately. Subjective   HPI  Martell Zambrano c/o feeling  sick  since yesterday - she had a fever 101 last night sweating a lot  but not today-  her whole body aches - scratchy throat - headache off and on  she has not tried anything -  Review of Systems   Constitutional:  Positive for diaphoresis and fever. Musculoskeletal:  Positive for myalgias. Objective   Patient-Reported Vitals  No data recorded     Physical Exam  [INSTRUCTIONS:  \"[x]\" Indicates a positive item  \"[]\" Indicates a negative item  -- DELETE ALL ITEMS NOT EXAMINED]    Constitutional: [x] Appears well-developed and well-nourished [x] No apparent distress      [] Abnormal -     Mental status: [x] Alert and awake  [x] Oriented to person/place/time [x] Able to follow commands    [] Abnormal -     Eyes:   EOM    [x]  Normal    [] Abnormal -   Sclera  [x]  Normal    [] Abnormal -          Discharge [x]  None visible   [] Abnormal -     HENT: [x] Normocephalic, atraumatic  [] Abnormal -   [x] Mouth/Throat: Mucous membranes are moist    External Ears [x] Normal  [] Abnormal -    Neck: [x] No visualized mass [] Abnormal -     Pulmonary/Chest: [x] Respiratory effort normal   [x] No visualized signs of difficulty breathing or respiratory distress        [] Abnormal -      Musculoskeletal:   [x] Normal gait with no signs of ataxia         [x] Normal range of motion of neck        [] Abnormal -     Neurological:        [x] No Facial Asymmetry (Cranial nerve 7 motor function) (limited exam due to video visit)          [x] No gaze palsy        [] Abnormal -          Skin:        [x] No significant exanthematous lesions or discoloration noted on facial skin         [] Abnormal -            Psychiatric:       [x] Normal Affect [] Abnormal -        [x] No Hallucinations    Other pertinent observable physical exam findings:-             Glynn Cassidy, was evaluated through a synchronous (real-time) audio-video encounter.  The patient (or guardian if applicable) is aware that this is a billable service, which includes applicable co-pays. This Virtual Visit was conducted with patient's (and/or legal guardian's) consent. The visit was conducted pursuant to the emergency declaration under the [address] [place] [address], [address] [place] [address] authority and the [employer] and [employer] General Act. Patient identification was verified, and a caregiver was present when appropriate. The patient was located at Other: work . Provider was located at [facility] (Appt Dept): [facility],  [address]         --Jet Walker, APRN - CNP

## 2022-11-30 NOTE — TELEPHONE ENCOUNTER
From: Amadou Scales  To: Yazmin Cordova  Sent: 11/29/2022 5:16 PM EST  Subject: note    Laura your flu and covid are both negative   I will get the work note emailed to you      jose

## 2023-02-23 DIAGNOSIS — N64.4 PAIN OF BOTH BREASTS: Primary | ICD-10-CM

## 2023-03-02 ENCOUNTER — OFFICE VISIT (OUTPATIENT)
Dept: FAMILY MEDICINE CLINIC | Age: 58
End: 2023-03-02
Payer: COMMERCIAL

## 2023-03-02 VITALS
BODY MASS INDEX: 26.37 KG/M2 | TEMPERATURE: 98.3 F | HEIGHT: 68 IN | WEIGHT: 174 LBS | HEART RATE: 63 BPM | DIASTOLIC BLOOD PRESSURE: 68 MMHG | OXYGEN SATURATION: 98 % | SYSTOLIC BLOOD PRESSURE: 110 MMHG

## 2023-03-02 DIAGNOSIS — K52.9 ACUTE GASTROENTERITIS: Primary | ICD-10-CM

## 2023-03-02 PROCEDURE — 99213 OFFICE O/P EST LOW 20 MIN: CPT

## 2023-03-02 RX ORDER — DICYCLOMINE HYDROCHLORIDE 10 MG/1
10 CAPSULE ORAL 4 TIMES DAILY
Qty: 120 CAPSULE | Refills: 0 | Status: SHIPPED | OUTPATIENT
Start: 2023-03-02

## 2023-03-02 RX ORDER — ONDANSETRON 4 MG/1
4 TABLET, ORALLY DISINTEGRATING ORAL 3 TIMES DAILY PRN
Qty: 21 TABLET | Refills: 0 | Status: SHIPPED | OUTPATIENT
Start: 2023-03-02

## 2023-03-02 SDOH — ECONOMIC STABILITY: FOOD INSECURITY: WITHIN THE PAST 12 MONTHS, YOU WORRIED THAT YOUR FOOD WOULD RUN OUT BEFORE YOU GOT MONEY TO BUY MORE.: NEVER TRUE

## 2023-03-02 SDOH — ECONOMIC STABILITY: FOOD INSECURITY: WITHIN THE PAST 12 MONTHS, THE FOOD YOU BOUGHT JUST DIDN'T LAST AND YOU DIDN'T HAVE MONEY TO GET MORE.: NEVER TRUE

## 2023-03-02 SDOH — ECONOMIC STABILITY: HOUSING INSECURITY
IN THE LAST 12 MONTHS, WAS THERE A TIME WHEN YOU DID NOT HAVE A STEADY PLACE TO SLEEP OR SLEPT IN A SHELTER (INCLUDING NOW)?: NO

## 2023-03-02 SDOH — ECONOMIC STABILITY: INCOME INSECURITY: HOW HARD IS IT FOR YOU TO PAY FOR THE VERY BASICS LIKE FOOD, HOUSING, MEDICAL CARE, AND HEATING?: NOT HARD AT ALL

## 2023-03-02 ASSESSMENT — PATIENT HEALTH QUESTIONNAIRE - PHQ9
5. POOR APPETITE OR OVEREATING: 0
6. FEELING BAD ABOUT YOURSELF - OR THAT YOU ARE A FAILURE OR HAVE LET YOURSELF OR YOUR FAMILY DOWN: 0
SUM OF ALL RESPONSES TO PHQ9 QUESTIONS 1 & 2: 1
SUM OF ALL RESPONSES TO PHQ QUESTIONS 1-9: 1
4. FEELING TIRED OR HAVING LITTLE ENERGY: 0
3. TROUBLE FALLING OR STAYING ASLEEP: 0
1. LITTLE INTEREST OR PLEASURE IN DOING THINGS: 0
SUM OF ALL RESPONSES TO PHQ QUESTIONS 1-9: 1
9. THOUGHTS THAT YOU WOULD BE BETTER OFF DEAD, OR OF HURTING YOURSELF: 0
8. MOVING OR SPEAKING SO SLOWLY THAT OTHER PEOPLE COULD HAVE NOTICED. OR THE OPPOSITE, BEING SO FIGETY OR RESTLESS THAT YOU HAVE BEEN MOVING AROUND A LOT MORE THAN USUAL: 0
SUM OF ALL RESPONSES TO PHQ QUESTIONS 1-9: 1
10. IF YOU CHECKED OFF ANY PROBLEMS, HOW DIFFICULT HAVE THESE PROBLEMS MADE IT FOR YOU TO DO YOUR WORK, TAKE CARE OF THINGS AT HOME, OR GET ALONG WITH OTHER PEOPLE: 0
SUM OF ALL RESPONSES TO PHQ QUESTIONS 1-9: 1
2. FEELING DOWN, DEPRESSED OR HOPELESS: 1
7. TROUBLE CONCENTRATING ON THINGS, SUCH AS READING THE NEWSPAPER OR WATCHING TELEVISION: 0

## 2023-03-02 ASSESSMENT — ENCOUNTER SYMPTOMS
WHEEZING: 0
RHINORRHEA: 0
COUGH: 0
ABDOMINAL DISTENTION: 0
SINUS PRESSURE: 1
VOMITING: 1
TROUBLE SWALLOWING: 0
EYE ITCHING: 0
DIARRHEA: 1
BLOOD IN STOOL: 0
SINUS PAIN: 0
CONSTIPATION: 0
COLOR CHANGE: 0
SORE THROAT: 0
EYE DISCHARGE: 0
EYE REDNESS: 0
NAUSEA: 1
CHEST TIGHTNESS: 0
ANAL BLEEDING: 0
ABDOMINAL PAIN: 1
SHORTNESS OF BREATH: 0
RECTAL PAIN: 0
BACK PAIN: 0

## 2023-03-02 NOTE — LETTER
March 2, 2023       [address] YOB: 1965   ROHITH Nguyen [address] [place] [id number] Date of Visit:  3/2/2023       To Whom It May Concern: It is my medical opinion that [address] should remain out of work until 03/04/2023. If you have any questions or concerns, please don't hesitate to call.     Sincerely,        Evelyne Chen, APRN - CNP

## 2023-03-02 NOTE — PATIENT INSTRUCTIONS
Imodium and probiotics!! GENERAL OFFICE POLICIES      Telephone Calls: Messages will be answered within 1-2 business days, unless the provider is out of the office. If it is urgent a covering provider will answer. (this does not include Medication refills). MyChart: We recommend all patients sign up for [employer]. Through this portal you can see your lab results, request refills, schedule appointments, pay your bill and send messages to the office. MyChart messages will be answered within 1-2 business days unless the provider is out of the office. For urgent matters, please call the office. Appointments:  All appointments must be scheduled. We ask all patients to schedule their next follow up appointment before they leave the office to make sure you will be able to be seen before you run out of medications. 24 hours notice is required to cancel or reschedule an appointment to avoid being marked as a no show. You may be dismissed from the practice after 3 no shows. LATE for Appointment: If you are 15 or more minutes late for your appointment, you may be asked to reschedule. MA/LAB APPTS: Must be scheduled, cannot accept walk in lab visits. We only draw labs for patients established in our office. We only do injections for medications ordered by our office. Acute Sick Visits:  Nothing other than acute complaint will be addressed at this visit. TRADITIONAL MEDICARE  DOES NOT COVER PHYSICALS  MEDICARE WELLNESS VISITS: These are NOT physicals but the free annual visit offered by Medicare to discuss wellness issues. Medication refills, checkups, etc. will not be addressed during this visit. Medication Refills: Refills are handled electronically so please contact your pharmacy for medication refills even if current refills have been exhausted. If you are on a controlled medication you will be referred to a specialist (pain specialist, psychiatry, etc). Forms:  There is a $35 fee to fill out FMLA/Disability paperwork, payable at time of . Instead of the fee, you can choose to have the paperwork filled out during a separate office visit that is for filling out the paperwork only. Medication Samples: This office does not carry medication samples. If you need assistance in getting your medications, then please let the medical assistant know so they can help you sign up for a drug assistance program that can help get medications at a reduced cost or even free (if you qualify). Workman's Comp Claims: We do not handle workman's comp cases or claims. You will need to go to an urgent care to be seen or to whomever your employer uses. General - Any abusive/rude behavior toward staff/providers may be cause for dismissal.    Sheng Mitchell may receive a survey regarding the care you received during your visit. Your input is valuable to us. We encourage you to complete and return your survey. We hope you will choose us in the future for your healthcare needs.

## 2023-03-02 NOTE — PROGRESS NOTES
Merton Gowers (:  1965) is a 56 y.o. female,Established patient, here for evaluation of the following chief complaint(s):  Diarrhea (DIZZINESS, NAUSEA, VOMITING, DIARRHEA, BODY ACHES, FEVER YESTERDAY. )         ASSESSMENT/PLAN:  1. Acute gastroenteritis  -Given sinus symptoms with overwhelming GI symptoms, I am a suspicious for a viral gastroenteritis. -Treatment options discussed. Zofran and Bentylare being sent to the pharmacy. The medication uses and side effects were discussed with the patient. Patient verbalized understanding and agrees to the plan. -Recommending Imodium as needed for going out only, probiotic daily recommended as well. -Emphasis on rest and hydration.  -Work to provide a state of work until Saturday.  Elida Aneudys diet, stick with broth until feeling better. Then progressed to low soups mixed. Avoid acidic foods, spicy, alcohol, caffeine, chocolate, sweets, fatty foods.  -If no improvement by Monday, consider stool culture.  -Follow-up as needed. -     ondansetron (ZOFRAN-ODT) 4 MG disintegrating tablet; Take 1 tablet by mouth 3 times daily as needed for Nausea or Vomiting, Disp-21 tablet, R-0Normal  -     dicyclomine (BENTYL) 10 MG capsule; Take 1 capsule by mouth 4 times daily, Disp-120 capsule, R-0Normal    Return if symptoms worsen or fail to improve. Subjective   SUBJECTIVE/OBJECTIVE:  SANDHYA Begum presents today with fatigue, nausea, vomiting, abdominal cramping, and sinus pressure. She began feeling bad yesterday, threw up at work. She is missing work today. Her symptoms began Tuesday, and then got worse yesterday. She stopped vomiting yesterday but restarted at 2am.  She notes vomiting 4 times the first day, 1 time yesterday, twice so far today. She reports her appetite is down, and states warm water will stay down for a few hours, but sometimes can come back up. She states she wants to eat soup, and so far has tolerated one saltine cracker.   She states she is nauseous all the time. Her last fever was yesterday, chills yesterday. She had experienced body aches this morning around 3 AM. She works around a lot of people, probably encountered someone sick at work. She has been experiencing diarrhea, normal color but runny, extra crampy and gassy. The diarrhea has also present for 2 days. If she can keep food down, then she gets cramping and has to go to the bathroom. She also complains of lightheaded, dizziness, comes and goes. Runny nose is clear, some pressure under her eyes bilaterally. No cough, sneeze, sore throat, ear pressure, or shortness of breath. Review of Systems   Constitutional:  Positive for appetite change, chills, fatigue and fever. Negative for activity change and diaphoresis. HENT:  Positive for sinus pressure. Negative for congestion, drooling, ear discharge, ear pain, mouth sores, postnasal drip, rhinorrhea, sinus pain, sneezing, sore throat and trouble swallowing. Eyes:  Negative for discharge, redness, itching and visual disturbance. Respiratory:  Negative for cough, chest tightness, shortness of breath and wheezing. Cardiovascular:  Negative for chest pain and palpitations. Gastrointestinal:  Positive for abdominal pain (Cramping), diarrhea, nausea and vomiting. Negative for abdominal distention, anal bleeding, blood in stool, constipation and rectal pain. Endocrine: Negative for polydipsia, polyphagia and polyuria. Genitourinary:  Negative for decreased urine volume, difficulty urinating, dysuria, flank pain, frequency, hematuria and urgency. Musculoskeletal:  Positive for myalgias. Negative for back pain. Skin:  Negative for color change and pallor. Allergic/Immunologic: Negative for environmental allergies. Neurological:  Positive for dizziness and light-headedness. Negative for syncope, weakness and headaches. Psychiatric/Behavioral:  Negative for confusion, dysphoric mood and sleep disturbance. The patient is not nervous/anxious. Objective   Physical Exam  Vitals and nursing note reviewed. Constitutional:       General: She is not in acute distress. Appearance: Normal appearance. She is normal weight. She is not ill-appearing or diaphoretic. HENT:      Head: Normocephalic and atraumatic. Right Ear: Tympanic membrane, ear canal and external ear normal. No middle ear effusion. There is no impacted cerumen. Left Ear: Tympanic membrane, ear canal and external ear normal.  No middle ear effusion. There is no impacted cerumen. Nose: Nose normal. No congestion or rhinorrhea. Mouth/Throat:      Mouth: Mucous membranes are moist.      Pharynx: Oropharynx is clear. Eyes:      Extraocular Movements: Extraocular movements intact. Conjunctiva/sclera: Conjunctivae normal.      Pupils: Pupils are equal, round, and reactive to light. Cardiovascular:      Rate and Rhythm: Normal rate and regular rhythm. Pulses: Normal pulses. Heart sounds: Normal heart sounds. No murmur heard. Pulmonary:      Effort: Pulmonary effort is normal. No respiratory distress. Breath sounds: Normal breath sounds. No wheezing or rales. Chest:      Chest wall: No tenderness. Abdominal:      General: Bowel sounds are normal. There is no distension. Palpations: Abdomen is soft. There is no mass. Tenderness: There is generalized abdominal tenderness. There is no right CVA tenderness, left CVA tenderness, guarding or rebound. Hernia: No hernia is present. Musculoskeletal:         General: No tenderness. Normal range of motion. Skin:     General: Skin is warm and dry. Coloration: Skin is not jaundiced or pale. Findings: No erythema or rash. Neurological:      Mental Status: She is alert and oriented to person, place, and time. Psychiatric:         Mood and Affect: Mood normal.         Behavior: Behavior normal.         Thought Content:  Thought content normal.         Judgment: Judgment normal.                An electronic signature was used to authenticate this note. --Radha Penn, SHON - CNP       Please note that this chart was generated using dragon dictation software. Although every effort was made to ensure the accuracy of this automated transcription, some errors in transcription may have occurred.

## 2023-03-22 RX ORDER — ACYCLOVIR 200 MG/1
CAPSULE ORAL
Qty: 90 CAPSULE | Refills: 0 | Status: SHIPPED | OUTPATIENT
Start: 2023-03-22

## 2023-05-15 NOTE — TELEPHONE ENCOUNTER
escitalopram (LEXAPRO) 10 MG tablet (Discontinued) 90 tablet 0 3/23/2022 9/8/2022    Sig: TAKE 1 TABLET BY MOUTH EVERY DAY    Patient not taking: Reported on 9/8/2022        Sent to pharmacy as: Escitalopram Oxalate 10 MG Oral Tablet (Zamzam Garcia)    Reason for Discontinue: LIST CLEANUP    Cosign for Ordering: Accepted by SHON Barajas CNP on 3/23/2022 11:12 AM    E-Prescribing Status: Receipt confirmed by pharmacy (3/23/2022 10:45 AM EDT)    DISCONTINUED 3

## 2023-06-29 ENCOUNTER — TELEPHONE (OUTPATIENT)
Dept: FAMILY MEDICINE CLINIC | Age: 58
End: 2023-06-29

## 2023-07-03 RX ORDER — ACYCLOVIR 200 MG/1
200 CAPSULE ORAL 3 TIMES DAILY
Qty: 90 CAPSULE | Refills: 0 | Status: SHIPPED | OUTPATIENT
Start: 2023-07-03

## 2023-07-09 DIAGNOSIS — E78.00 HYPERCHOLESTEROLEMIA: ICD-10-CM

## 2023-07-10 RX ORDER — ATORVASTATIN CALCIUM 10 MG/1
TABLET, FILM COATED ORAL
Qty: 90 TABLET | Refills: 0 | Status: SHIPPED | OUTPATIENT
Start: 2023-07-10

## 2023-08-23 ENCOUNTER — E-VISIT (OUTPATIENT)
Dept: FAMILY MEDICINE CLINIC | Age: 58
End: 2023-08-23
Payer: COMMERCIAL

## 2023-08-23 DIAGNOSIS — J30.2 SEASONAL ALLERGIES: Primary | ICD-10-CM

## 2023-08-23 PROCEDURE — 99422 OL DIG E/M SVC 11-20 MIN: CPT | Performed by: NURSE PRACTITIONER

## 2023-08-23 RX ORDER — KETOTIFEN FUMARATE 0.35 MG/ML
1 SOLUTION/ DROPS OPHTHALMIC 2 TIMES DAILY
Qty: 1 ML | Refills: 0 | Status: SHIPPED | OUTPATIENT
Start: 2023-08-23 | End: 2023-09-02

## 2023-08-23 RX ORDER — LEVOCETIRIZINE DIHYDROCHLORIDE 2.5 MG/5ML
5 SOLUTION ORAL DAILY
Qty: 148 ML | Refills: 0 | Status: SHIPPED | OUTPATIENT
Start: 2023-08-23 | End: 2023-11-21

## 2023-08-23 ASSESSMENT — LIFESTYLE VARIABLES: SMOKING_STATUS: NO, I'VE NEVER SMOKED

## 2023-08-25 ENCOUNTER — HOSPITAL ENCOUNTER (OUTPATIENT)
Dept: WOMENS IMAGING | Age: 58
Discharge: HOME | End: 2023-08-25
Payer: COMMERCIAL

## 2023-08-25 ENCOUNTER — HOSPITAL ENCOUNTER (OUTPATIENT)
Dept: ULTRASOUND IMAGING | Age: 58
Discharge: HOME | End: 2023-08-25
Payer: COMMERCIAL

## 2023-08-25 VITALS — WEIGHT: 163 LBS | BODY MASS INDEX: 24.71 KG/M2 | HEIGHT: 68 IN

## 2023-08-25 DIAGNOSIS — N64.4 PAIN OF BOTH BREASTS: ICD-10-CM

## 2023-08-25 PROCEDURE — G0279 TOMOSYNTHESIS, MAMMO: HCPCS

## 2023-09-06 ENCOUNTER — E-VISIT (OUTPATIENT)
Dept: FAMILY MEDICINE CLINIC | Age: 58
End: 2023-09-06
Payer: COMMERCIAL

## 2023-09-06 DIAGNOSIS — B37.0 THRUSH: Primary | ICD-10-CM

## 2023-09-06 PROCEDURE — 99422 OL DIG E/M SVC 11-20 MIN: CPT | Performed by: NURSE PRACTITIONER

## 2023-09-06 ASSESSMENT — LIFESTYLE VARIABLES: SMOKING_STATUS: NO, I'VE NEVER SMOKED

## 2023-09-18 ENCOUNTER — OFFICE VISIT (OUTPATIENT)
Dept: FAMILY MEDICINE CLINIC | Age: 58
End: 2023-09-18
Payer: COMMERCIAL

## 2023-09-18 VITALS
SYSTOLIC BLOOD PRESSURE: 118 MMHG | WEIGHT: 159 LBS | OXYGEN SATURATION: 100 % | DIASTOLIC BLOOD PRESSURE: 72 MMHG | BODY MASS INDEX: 24.1 KG/M2 | HEART RATE: 60 BPM | HEIGHT: 68 IN

## 2023-09-18 DIAGNOSIS — R73.03 PREDIABETES: ICD-10-CM

## 2023-09-18 DIAGNOSIS — N89.8 VAGINAL DISCHARGE: ICD-10-CM

## 2023-09-18 DIAGNOSIS — J30.2 SEASONAL ALLERGIES: ICD-10-CM

## 2023-09-18 DIAGNOSIS — E78.00 HYPERCHOLESTEROLEMIA: ICD-10-CM

## 2023-09-18 DIAGNOSIS — Z13.1 DIABETES MELLITUS SCREENING: ICD-10-CM

## 2023-09-18 DIAGNOSIS — Z01.419 WELL WOMAN EXAM: Primary | ICD-10-CM

## 2023-09-18 DIAGNOSIS — E78.2 MIXED HYPERLIPIDEMIA: ICD-10-CM

## 2023-09-18 PROBLEM — F32.5 MAJOR DEPRESSIVE DISORDER WITH SINGLE EPISODE, IN FULL REMISSION (HCC): Status: ACTIVE | Noted: 2023-09-18

## 2023-09-18 PROBLEM — F32.5 MAJOR DEPRESSIVE DISORDER WITH SINGLE EPISODE, IN FULL REMISSION (HCC): Status: RESOLVED | Noted: 2023-09-18 | Resolved: 2023-09-18

## 2023-09-18 PROBLEM — D35.2 PITUITARY ADENOMA WITH EXTRASELLAR EXTENSION (HCC): Status: RESOLVED | Noted: 2018-03-15 | Resolved: 2023-09-18

## 2023-09-18 LAB
ALBUMIN SERPL-MCNC: 4.7 G/DL (ref 3.4–5)
ALBUMIN/GLOB SERPL: 1.8 {RATIO} (ref 1.1–2.2)
ALP SERPL-CCNC: 48 U/L (ref 40–129)
ALT SERPL-CCNC: 11 U/L (ref 10–40)
ANION GAP SERPL CALCULATED.3IONS-SCNC: 13 MMOL/L (ref 3–16)
AST SERPL-CCNC: 16 U/L (ref 15–37)
BILIRUB SERPL-MCNC: 0.6 MG/DL (ref 0–1)
BUN SERPL-MCNC: 10 MG/DL (ref 7–20)
C TRACH DNA UR QL NAA+PROBE: NEGATIVE
CALCIUM SERPL-MCNC: 9.7 MG/DL (ref 8.3–10.6)
CHLORIDE SERPL-SCNC: 103 MMOL/L (ref 99–110)
CHOLEST SERPL-MCNC: 184 MG/DL (ref 0–199)
CO2 SERPL-SCNC: 24 MMOL/L (ref 21–32)
CREAT SERPL-MCNC: 0.8 MG/DL (ref 0.6–1.1)
EST. AVERAGE GLUCOSE BLD GHB EST-MCNC: 139.9 MG/DL
GFR SERPLBLD CREATININE-BSD FMLA CKD-EPI: >60 ML/MIN/{1.73_M2}
GLUCOSE SERPL-MCNC: 107 MG/DL (ref 70–99)
HBA1C MFR BLD: 6.5 %
HDLC SERPL-MCNC: 43 MG/DL (ref 40–60)
HIV 1+2 AB+HIV1 P24 AG SERPL QL IA: NORMAL
HIV 2 AB SERPL QL IA: NORMAL
HIV1 AB SERPL QL IA: NORMAL
HIV1 P24 AG SERPL QL IA: NORMAL
LDLC SERPL CALC-MCNC: 113 MG/DL
N GONORRHOEA DNA UR QL NAA+PROBE: NEGATIVE
POTASSIUM SERPL-SCNC: 3.5 MMOL/L (ref 3.5–5.1)
PROT SERPL-MCNC: 7.3 G/DL (ref 6.4–8.2)
REAGIN+T PALLIDUM IGG+IGM SERPL-IMP: NORMAL
SODIUM SERPL-SCNC: 140 MMOL/L (ref 136–145)
TRIGL SERPL-MCNC: 142 MG/DL (ref 0–150)
VLDLC SERPL CALC-MCNC: 28 MG/DL

## 2023-09-18 PROCEDURE — 36415 COLL VENOUS BLD VENIPUNCTURE: CPT | Performed by: NURSE PRACTITIONER

## 2023-09-18 PROCEDURE — 99396 PREV VISIT EST AGE 40-64: CPT | Performed by: NURSE PRACTITIONER

## 2023-09-18 RX ORDER — ATORVASTATIN CALCIUM 10 MG/1
10 TABLET, FILM COATED ORAL DAILY
Qty: 90 TABLET | Refills: 3 | Status: SHIPPED | OUTPATIENT
Start: 2023-10-11 | End: 2024-01-09

## 2023-09-18 RX ORDER — LEVOCETIRIZINE DIHYDROCHLORIDE 5 MG/1
5 TABLET, FILM COATED ORAL NIGHTLY
Qty: 90 TABLET | Refills: 2 | Status: SHIPPED | OUTPATIENT
Start: 2023-09-18 | End: 2023-12-17

## 2023-09-18 NOTE — PROGRESS NOTES
History     Tobacco Use   Smoking Status Never   Smokeless Tobacco Never      Social History     Substance and Sexual Activity   Alcohol Use Never        Immunization History   Administered Date(s) Administered    COVID-19, PFIZER PURPLE top, DILUTE for use, (age 15 y+), 30mcg/0.3mL 04/12/2021, 05/02/2021    Hepatitis B (Recombivax HB) 09/30/2014, 02/03/2015, 06/17/2015    INFLUENZA, INTRADERMAL, QUADRIVALENT, PRESERVATIVE FREE 10/17/2017    Influenza Vaccine, unspecified formulation 01/10/2014, 12/23/2015    Influenza Virus Vaccine 01/10/2014, 09/30/2014, 12/23/2015, 09/10/2018    Influenza Whole 01/02/2014    Influenza, AFLURIA (age 1 yrs+), FLUZONE, (age 10 mo+), MDV, 0.5mL 10/21/2016, 09/10/2018    Influenza, FLUARIX, FLULAVAL, FLUZONE (age 10 mo+) AND AFLURIA, (age 1 y+), PF, 0.5mL 10/20/2020    Influenza, FLUCELVAX, (age 10 mo+), MDCK, PF, 0.5mL 09/29/2021    Influenza, Intradermal, Preservative free 12/23/2015    Pneumococcal, PCV-13, PREVNAR 15, (age 6w+), IM, 0.5mL 08/28/2017    Pneumococcal, PPSV23, PNEUMOVAX 23, (age 2y+), SC/IM, 0.5mL 03/07/2018    TDaP, ADACEL (age 6y-58y), BOOSTRIX (age 10y+), IM, 0.5mL 03/14/2014    Zoster Recombinant (Shingrix) 04/17/2019, 08/14/2019       Review of Systems:  A comprehensive review of systems was negative except for what was noted in the HPI. Wt Readings from Last 3 Encounters:   08/25/23 163 lb (73.9 kg)   03/02/23 174 lb (78.9 kg)   10/17/22 169 lb (76.7 kg)     BP Readings from Last 3 Encounters:   03/02/23 110/68   10/17/22 124/84   09/08/22 120/78       Physical Exam:  There were no vitals filed for this visit. There is no height or weight on file to calculate BMI. Constitutional: She is oriented to person, place, and time. She appears well-developed and well-nourished. No distress. Neck: No mass and no thyromegaly present. Cardiovascular: Normal rate, regular rhythm and normal heart sounds. No murmur heard.   Pulmonary/Chest: Effort and breath sounds

## 2023-09-19 LAB
CANDIDA DNA VAG QL NAA+PROBE: NORMAL
G VAGINALIS DNA SPEC QL NAA+PROBE: NORMAL
T VAGINALIS DNA VAG QL NAA+PROBE: NORMAL

## 2023-09-21 LAB
HSV1 GG IGG SER-ACNC: 0.58 IV
HSV1+2 IGG SER IA-ACNC: >22.4 IV
HSV1+2 IGM SER IA-ACNC: 0.62 IV
HSV2 GG IGG SER-ACNC: >23.6 IV

## 2023-09-25 ENCOUNTER — TELEPHONE (OUTPATIENT)
Dept: FAMILY MEDICINE CLINIC | Age: 58
End: 2023-09-25

## 2023-09-25 NOTE — TELEPHONE ENCOUNTER
When pt was here for  her physical we sent in a medication for her that starts with a L. This was a pill forn. She did not want that medication. She wants the Nystatin called in. (She thinks that is what it was it began with an N) She still has the Susi. Rafaela Uriostegui. Can she get a refill on this it only last two days. It is a liquid?

## 2023-10-15 DIAGNOSIS — E78.00 HYPERCHOLESTEROLEMIA: ICD-10-CM

## 2023-10-16 RX ORDER — ATORVASTATIN CALCIUM 10 MG/1
10 TABLET, FILM COATED ORAL DAILY
Qty: 90 TABLET | Refills: 0 | OUTPATIENT
Start: 2023-10-16

## 2023-10-16 NOTE — TELEPHONE ENCOUNTER
LV 9/18/23 WITH CC FOR PAP NV NONE  atorvastatin (LIPITOR) 10 MG tablet 90 tablet 3 10/11/2023 1/9/2024    Sig - Route: Take 1 tablet by mouth daily - Oral    Sent to pharmacy as:  Atorvastatin Calcium 10 MG Oral Tablet (LIPITOR)    E-Prescribing Status: Receipt confirmed by pharmacy (9/18/2023  0:31 PM EDT)    Norman Brown

## 2023-10-19 DIAGNOSIS — E78.00 HYPERCHOLESTEROLEMIA: ICD-10-CM

## 2023-10-19 RX ORDER — ACYCLOVIR 200 MG/1
200 CAPSULE ORAL 3 TIMES DAILY
Qty: 90 CAPSULE | Refills: 0 | Status: SHIPPED | OUTPATIENT
Start: 2023-10-19

## 2023-10-19 RX ORDER — ATORVASTATIN CALCIUM 10 MG/1
10 TABLET, FILM COATED ORAL DAILY
Qty: 90 TABLET | Refills: 0 | Status: SHIPPED | OUTPATIENT
Start: 2023-10-19 | End: 2024-01-17

## 2023-12-27 DIAGNOSIS — E78.00 HYPERCHOLESTEROLEMIA: ICD-10-CM

## 2023-12-28 RX ORDER — ESCITALOPRAM OXALATE 10 MG/1
10 TABLET ORAL DAILY
Qty: 90 TABLET | OUTPATIENT
Start: 2023-12-28

## 2023-12-28 RX ORDER — ATORVASTATIN CALCIUM 10 MG/1
10 TABLET, FILM COATED ORAL DAILY
Qty: 90 TABLET | Refills: 0 | Status: SHIPPED | OUTPATIENT
Start: 2023-12-28

## 2024-02-26 NOTE — PATIENT INSTRUCTIONS
You may receive a survey regarding the care you received during your visit. Your input is valuable to us. We encourage you to complete and return your survey. We hope you will choose us in the future for your healthcare needs. GENERAL OFFICE POLICIES      Telephone Calls: Messages will be answered within 1-2 business days, unless the provider is out of the office. If it is urgent a covering provider will answer. (this does not include Medication refills). MyChart: We recommend all patients sign up for [employer]. Through this portal you can see your lab results, request refills, schedule appointments, pay your bill and send messages to the office. [employer] messages will be answered within 1-2 business days unless the provider is out of the office. For urgent matters, please call the office. Appointments:  All appointments must be scheduled. We ask all patients to schedule their next follow up appointment before they leave the office to make sure you will be able to be seen before you run out of medications. 24 hours notice is required to cancel or reschedule an appointment to avoid being marked as a no show. You may be dismissed from the practice after 3 no shows. LATE for Appointment: If you are 15 or more minutes late for your appointment, you may be asked to reschedule. MA/LAB APPTS: Must be scheduled, cannot accept walk in lab visits. We only draw labs for patients established in our office. We only do injections for medications ordered by our office. Acute Sick Visits:  Nothing other than acute complaint will be addressed at this visit. TRADITIONAL MEDICARE  DOES NOT COVER PHYSICALS  MEDICARE WELLNESS VISITS: These are NOT physicals but the free annual visit offered by Medicare to discuss wellness issues. Medication refills, checkups, etc. will not be addressed during this visit.   Medication Refills: Refills are handled electronically so please contact your pharmacy for medication refills
Free from fall injury  Flowsheets (Taken 2/26/2024 1412)  Free From Fall Injury:   Instruct family/caregiver on patient safety   Based on caregiver fall risk screen, instruct family/caregiver to ask for assistance with transferring infant if caregiver noted to have fall risk factors  Note: Free from falls while in O.P. Oncology.   Care plan reviewed with patient and spouse.  Patient and spouse verbalize understanding of the plan of care and contribute to goal setting.

## 2024-05-10 ENCOUNTER — OFFICE VISIT (OUTPATIENT)
Dept: FAMILY MEDICINE CLINIC | Age: 59
End: 2024-05-10
Payer: COMMERCIAL

## 2024-05-10 VITALS
OXYGEN SATURATION: 99 % | DIASTOLIC BLOOD PRESSURE: 64 MMHG | WEIGHT: 170.6 LBS | BODY MASS INDEX: 25.85 KG/M2 | SYSTOLIC BLOOD PRESSURE: 132 MMHG | HEIGHT: 68 IN | HEART RATE: 60 BPM

## 2024-05-10 DIAGNOSIS — E78.2 MIXED HYPERLIPIDEMIA: ICD-10-CM

## 2024-05-10 DIAGNOSIS — E55.9 VITAMIN D DEFICIENCY: ICD-10-CM

## 2024-05-10 DIAGNOSIS — R73.03 PREDIABETES: ICD-10-CM

## 2024-05-10 DIAGNOSIS — S86.911A KNEE STRAIN, RIGHT, INITIAL ENCOUNTER: Primary | ICD-10-CM

## 2024-05-10 DIAGNOSIS — Z12.11 SCREENING FOR COLON CANCER: ICD-10-CM

## 2024-05-10 DIAGNOSIS — M81.0 OSTEOPOROSIS WITHOUT CURRENT PATHOLOGICAL FRACTURE, UNSPECIFIED OSTEOPOROSIS TYPE: ICD-10-CM

## 2024-05-10 LAB
25(OH)D3 SERPL-MCNC: 59.4 NG/ML
ALBUMIN SERPL-MCNC: 4.8 G/DL (ref 3.4–5)
ALBUMIN/GLOB SERPL: 1.4 {RATIO} (ref 1.1–2.2)
ALP SERPL-CCNC: 60 U/L (ref 40–129)
ALT SERPL-CCNC: 11 U/L (ref 10–40)
ANION GAP SERPL CALCULATED.3IONS-SCNC: 13 MMOL/L (ref 3–16)
AST SERPL-CCNC: 16 U/L (ref 15–37)
BILIRUB SERPL-MCNC: 0.4 MG/DL (ref 0–1)
BUN SERPL-MCNC: 16 MG/DL (ref 7–20)
CALCIUM SERPL-MCNC: 10.9 MG/DL (ref 8.3–10.6)
CHLORIDE SERPL-SCNC: 98 MMOL/L (ref 99–110)
CHOLEST SERPL-MCNC: 224 MG/DL (ref 0–199)
CO2 SERPL-SCNC: 25 MMOL/L (ref 21–32)
CORTIS SERPL-MCNC: 13.2 UG/DL
CREAT SERPL-MCNC: 0.9 MG/DL (ref 0.6–1.1)
GFR SERPLBLD CREATININE-BSD FMLA CKD-EPI: 75 ML/MIN/{1.73_M2}
GLUCOSE SERPL-MCNC: 113 MG/DL (ref 70–99)
HDLC SERPL-MCNC: 46 MG/DL (ref 40–60)
LDLC SERPL CALC-MCNC: 146 MG/DL
POTASSIUM SERPL-SCNC: 4.6 MMOL/L (ref 3.5–5.1)
PROT SERPL-MCNC: 8.2 G/DL (ref 6.4–8.2)
SODIUM SERPL-SCNC: 136 MMOL/L (ref 136–145)
TRIGL SERPL-MCNC: 158 MG/DL (ref 0–150)
VLDLC SERPL CALC-MCNC: 32 MG/DL

## 2024-05-10 PROCEDURE — 99213 OFFICE O/P EST LOW 20 MIN: CPT

## 2024-05-10 RX ORDER — ACYCLOVIR 200 MG/1
200 CAPSULE ORAL 3 TIMES DAILY
Qty: 90 CAPSULE | Refills: 0 | Status: SHIPPED | OUTPATIENT
Start: 2024-05-10

## 2024-05-10 SDOH — ECONOMIC STABILITY: INCOME INSECURITY: HOW HARD IS IT FOR YOU TO PAY FOR THE VERY BASICS LIKE FOOD, HOUSING, MEDICAL CARE, AND HEATING?: NOT HARD AT ALL

## 2024-05-10 SDOH — ECONOMIC STABILITY: FOOD INSECURITY: WITHIN THE PAST 12 MONTHS, THE FOOD YOU BOUGHT JUST DIDN'T LAST AND YOU DIDN'T HAVE MONEY TO GET MORE.: NEVER TRUE

## 2024-05-10 SDOH — ECONOMIC STABILITY: FOOD INSECURITY: WITHIN THE PAST 12 MONTHS, YOU WORRIED THAT YOUR FOOD WOULD RUN OUT BEFORE YOU GOT MONEY TO BUY MORE.: NEVER TRUE

## 2024-05-10 ASSESSMENT — ENCOUNTER SYMPTOMS
BACK PAIN: 0
COUGH: 0
COLOR CHANGE: 0
SHORTNESS OF BREATH: 0
CHEST TIGHTNESS: 0

## 2024-05-10 ASSESSMENT — PATIENT HEALTH QUESTIONNAIRE - PHQ9
2. FEELING DOWN, DEPRESSED OR HOPELESS: NOT AT ALL
SUM OF ALL RESPONSES TO PHQ QUESTIONS 1-9: 0
SUM OF ALL RESPONSES TO PHQ9 QUESTIONS 1 & 2: 0
SUM OF ALL RESPONSES TO PHQ QUESTIONS 1-9: 0
1. LITTLE INTEREST OR PLEASURE IN DOING THINGS: NOT AT ALL

## 2024-05-10 NOTE — PATIENT INSTRUCTIONS

## 2024-05-10 NOTE — TELEPHONE ENCOUNTER
Patient was asking to get this medication refilled     Acyclovir 200 MG, 3x's a day, 30 days    [facility]   Phone number

## 2024-05-10 NOTE — PROGRESS NOTES
Laura Soto (:  1965) is a 59 y.o. female,Established patient, here for evaluation of the following chief complaint(s):  Knee Pain (Pt C/O Right knee pain and swelling from moving heavy furniture on  and - pt is fasting for labs ) and Other (Due for colonoscopy- pt needs referral )      Assessment & Plan   1. Knee strain, right, initial encounter  -Presentation suspicious of strain of meniscus.  -Treatment option discussed.  Patient declined steroid.  Declines NSAIDs.  -RICE, heat, emphasis on stretching.  Information provided in AVS.  -Follow-up if no improvement.  Can consider PT referral      Return if symptoms worsen or fail to improve.       Subjective   HPI  Last Friday she was helping her daughter move. Later Friday she had some right knee pain, then helped her daughter move again Saturday. She has noticed swelling and pain, can feel that her range of motion is limited. No particular injury that she remembers. Possbily twisting, no weid steps or impact injuries. She did limp some, none today. She has had a few baby aspirin, but otherwise tolerating pain. Only used some ice. Worse at nighttime. She has been less active this week because of it. She walked once and felt okay, no pain. Walk was Tuesday. She has worked on lifting with her knee, and working toward stretching medially. No hip pains, no left leg issues. Reports history of left hip replacement 3 years ago. No redness or warmth, no skin colr changes. Small mound behind right knee. No particular pressure points of worsening pains.       Review of Systems   Constitutional:  Negative for activity change, diaphoresis and fatigue.   Eyes:  Negative for visual disturbance.   Respiratory:  Negative for cough, chest tightness and shortness of breath.    Cardiovascular:  Negative for chest pain, palpitations and leg swelling.   Musculoskeletal:  Positive for arthralgias (Right knee) and gait problem (Limp). Negative for back

## 2024-05-11 LAB
EST. AVERAGE GLUCOSE BLD GHB EST-MCNC: 137 MG/DL
HBA1C MFR BLD: 6.4 %

## 2024-05-13 DIAGNOSIS — E78.00 HYPERCHOLESTEROLEMIA: ICD-10-CM

## 2024-05-13 RX ORDER — ATORVASTATIN CALCIUM 20 MG/1
20 TABLET, FILM COATED ORAL DAILY
Qty: 90 TABLET | Refills: 1 | Status: SHIPPED | OUTPATIENT
Start: 2024-05-13 | End: 2024-11-09

## 2024-05-13 RX ORDER — ATORVASTATIN CALCIUM 10 MG/1
20 TABLET, FILM COATED ORAL DAILY
Qty: 90 TABLET | Status: CANCELLED | OUTPATIENT
Start: 2024-05-13

## 2024-05-26 DIAGNOSIS — E78.00 HYPERCHOLESTEROLEMIA: ICD-10-CM

## 2024-05-28 RX ORDER — ATORVASTATIN CALCIUM 10 MG/1
10 TABLET, FILM COATED ORAL DAILY
Qty: 90 TABLET | Refills: 0 | Status: SHIPPED | OUTPATIENT
Start: 2024-05-28

## 2024-07-31 DIAGNOSIS — F41.9 ANXIETY: Primary | ICD-10-CM

## 2024-08-16 DIAGNOSIS — E78.00 HYPERCHOLESTEROLEMIA: ICD-10-CM

## 2024-08-19 ENCOUNTER — PATIENT MESSAGE (OUTPATIENT)
Dept: FAMILY MEDICINE CLINIC | Age: 59
End: 2024-08-19

## 2024-08-19 RX ORDER — ATORVASTATIN CALCIUM 10 MG/1
10 TABLET, FILM COATED ORAL DAILY
Qty: 90 TABLET | Refills: 0 | OUTPATIENT
Start: 2024-08-19

## 2024-08-19 RX ORDER — ATORVASTATIN CALCIUM 20 MG/1
20 TABLET, FILM COATED ORAL DAILY
Qty: 90 TABLET | Refills: 0 | Status: SHIPPED | OUTPATIENT
Start: 2024-08-19 | End: 2024-11-17

## 2024-09-12 ENCOUNTER — PATIENT MESSAGE (OUTPATIENT)
Dept: FAMILY MEDICINE CLINIC | Age: 59
End: 2024-09-12

## 2024-09-20 ENCOUNTER — TELEPHONE (OUTPATIENT)
Dept: FAMILY MEDICINE CLINIC | Age: 59
End: 2024-09-20

## 2024-10-14 ENCOUNTER — OFFICE VISIT (OUTPATIENT)
Dept: FAMILY MEDICINE CLINIC | Age: 59
End: 2024-10-14
Payer: COMMERCIAL

## 2024-10-14 VITALS
OXYGEN SATURATION: 98 % | BODY MASS INDEX: 27.58 KG/M2 | RESPIRATION RATE: 16 BRPM | HEIGHT: 68 IN | DIASTOLIC BLOOD PRESSURE: 68 MMHG | SYSTOLIC BLOOD PRESSURE: 106 MMHG | HEART RATE: 70 BPM | WEIGHT: 182 LBS

## 2024-10-14 DIAGNOSIS — N64.4 PAIN OF BOTH BREASTS: ICD-10-CM

## 2024-10-14 DIAGNOSIS — Z00.00 ANNUAL PHYSICAL EXAM: Primary | ICD-10-CM

## 2024-10-14 DIAGNOSIS — R73.9 HYPERGLYCEMIA: ICD-10-CM

## 2024-10-14 DIAGNOSIS — M25.552 LEFT HIP PAIN: ICD-10-CM

## 2024-10-14 DIAGNOSIS — Z23 NEED FOR TDAP VACCINATION: ICD-10-CM

## 2024-10-14 DIAGNOSIS — E78.2 MIXED HYPERLIPIDEMIA: ICD-10-CM

## 2024-10-14 DIAGNOSIS — Z23 NEED FOR INFLUENZA VACCINATION: ICD-10-CM

## 2024-10-14 PROCEDURE — 99396 PREV VISIT EST AGE 40-64: CPT | Performed by: NURSE PRACTITIONER

## 2024-10-14 PROCEDURE — 90472 IMMUNIZATION ADMIN EACH ADD: CPT | Performed by: NURSE PRACTITIONER

## 2024-10-14 PROCEDURE — 90661 CCIIV3 VAC ABX FR 0.5 ML IM: CPT | Performed by: NURSE PRACTITIONER

## 2024-10-14 PROCEDURE — 90715 TDAP VACCINE 7 YRS/> IM: CPT | Performed by: NURSE PRACTITIONER

## 2024-10-14 PROCEDURE — 90471 IMMUNIZATION ADMIN: CPT | Performed by: NURSE PRACTITIONER

## 2024-10-14 PROCEDURE — 36415 COLL VENOUS BLD VENIPUNCTURE: CPT | Performed by: NURSE PRACTITIONER

## 2024-10-14 RX ORDER — CELECOXIB 100 MG/1
100 CAPSULE ORAL 2 TIMES DAILY
Qty: 60 CAPSULE | Refills: 0 | Status: SHIPPED | OUTPATIENT
Start: 2024-10-14 | End: 2024-11-13

## 2024-10-14 RX ORDER — ACYCLOVIR 200 MG/1
200 CAPSULE ORAL 3 TIMES DAILY
Qty: 90 CAPSULE | Refills: 1 | Status: SHIPPED | OUTPATIENT
Start: 2024-10-14

## 2024-10-14 NOTE — PROGRESS NOTES
Immunizations Administered       Name Date Dose Route    Influenza, FLUCELVAX, (age 6 mo+) IM, Trivalent PF, 0.5mL 10/14/2024 0.5 mL Intramuscular    Site: Deltoid- Left    Lot: [id number]    NDC: 39281-850-05    TDaP, ADACEL (age 10y-64y), BOOSTRIX (age 10y+), IM, 0.5mL 10/14/2024 0.5 mL Intramuscular    Site: Deltoid- Right    Lot: [id number]    NDC: 69327-121-84          Risks and benefits explained.  Current VIS given.  Consent signed.    
umbilectomy     Family History   Problem Relation Age of Onset    High Cholesterol Mother     Diabetes Mother     High Cholesterol Father     Diabetes Father     Ovarian Cancer Daughter     Breast Cancer Maternal Grandmother     Cancer Maternal Grandmother         colon    Cancer Paternal Grandmother         colon cancer    Breast Cancer Maternal Cousin     Breast Cancer Maternal Cousin     Breast Cancer Maternal Cousin     Alcohol Abuse Brother     Cancer Maternal Cousin         Several 1st cousins with cancer     Social History     Socioeconomic History    Marital status: Legally      Spouse name: Not on file    Number of children: Not on file    Years of education: Not on file    Highest education level: Not on file   Occupational History    Not on file   Tobacco Use    Smoking status: Never     Passive exposure: Never    Smokeless tobacco: Never   Vaping Use    Vaping status: Never Used   Substance and Sexual Activity    Alcohol use: Never    Drug use: Never    Sexual activity: Not Currently     Partners: Male   Other Topics Concern    Not on file   Social History Narrative    ** Merged History Encounter **    No exercise program. In PT for left hip x 3 wks. 10/5/20.     Social Determinants of Health     Financial Resource Strain: Low Risk  (5/10/2024)    Overall Financial Resource Strain (CARDIA)     Difficulty of Paying Living Expenses: Not hard at all   Food Insecurity: No Food Insecurity (5/10/2024)    Hunger Vital Sign     Worried About Running Out of Food in the Last Year: Never true     Ran Out of Food in the Last Year: Never true   Transportation Needs: Unknown (5/10/2024)    PRAPARE - Transportation     Lack of Transportation (Medical): Not on file     Lack of Transportation (Non-Medical): No   Physical Activity: Not on file   Stress: Not on file   Social Connections: Not on file   Intimate Partner Violence: Unknown (1/21/2024)    Received from [employer] and [facility],

## 2024-10-15 ENCOUNTER — TELEPHONE (OUTPATIENT)
Dept: FAMILY MEDICINE CLINIC | Age: 59
End: 2024-10-15

## 2024-10-15 DIAGNOSIS — E78.2 MIXED HYPERLIPIDEMIA: ICD-10-CM

## 2024-10-15 DIAGNOSIS — F51.04 PSYCHOPHYSIOLOGICAL INSOMNIA: Primary | ICD-10-CM

## 2024-10-15 LAB
CHOLEST SERPL-MCNC: 291 MG/DL (ref 0–199)
EST. AVERAGE GLUCOSE BLD GHB EST-MCNC: 137 MG/DL
HBA1C MFR BLD: 6.4 %
HDLC SERPL-MCNC: 35 MG/DL (ref 40–60)
LDLC SERPL CALC-MCNC: ABNORMAL MG/DL
LDLC SERPL-MCNC: 157 MG/DL
TRIGL SERPL-MCNC: 338 MG/DL (ref 0–150)
VLDLC SERPL CALC-MCNC: ABNORMAL MG/DL

## 2024-10-15 RX ORDER — TRAZODONE HYDROCHLORIDE 100 MG/1
TABLET ORAL
Qty: 60 TABLET | Refills: 0 | Status: SHIPPED | OUTPATIENT
Start: 2024-10-15

## 2024-10-15 RX ORDER — OMEGA-3-ACID ETHYL ESTERS 1 G/1
2 CAPSULE, LIQUID FILLED ORAL 2 TIMES DAILY
Qty: 360 CAPSULE | Refills: 0 | Status: SHIPPED | OUTPATIENT
Start: 2024-10-15 | End: 2024-10-21

## 2024-10-15 RX ORDER — ROSUVASTATIN CALCIUM 5 MG/1
5 TABLET, COATED ORAL NIGHTLY
Qty: 90 TABLET | Refills: 0 | Status: SHIPPED | OUTPATIENT
Start: 2024-10-15 | End: 2025-01-13

## 2024-10-15 NOTE — TELEPHONE ENCOUNTER
Pt stopped by to ask about trying something for sleep. She stated she talked about it in her appt yesterday but forgot to ask for a Rx. She would like to try Ambien, but is open to try Trazodone first.

## 2024-10-16 ENCOUNTER — TELEPHONE (OUTPATIENT)
Dept: FAMILY MEDICINE CLINIC | Age: 59
End: 2024-10-16

## 2024-10-16 ENCOUNTER — TELEPHONE (OUTPATIENT)
Dept: ADMINISTRATIVE | Age: 59
End: 2024-10-16

## 2024-10-16 NOTE — TELEPHONE ENCOUNTER
Submitted PA for Omega-3-acid Ethyl Esters 1GM capsules   Via [facility] (Key: [id number]) STATUS: PENDING.    Follow up done daily; if no decision with in three days we will refax.  If another three days goes by with no decision will call the insurance for status.

## 2024-10-16 NOTE — TELEPHONE ENCOUNTER
Eri Owen MA  10/15/2024  2:48 PM EDT Back to Top      Left  for return call    Gaby Linares, SHON - CNP  10/15/2024  1:06 PM EDT       HEL  LABS ARE LISTED BELOW     Fasting Lipid Panel     Total cholesterol ( bad cholesterol) is 291 .... Goal is 199 or less  Triglycerides are( bad cholesterol) 338 ....    Goal is 150 or less  HDL (healthy cholesterol) is 35....    Goal is 40-60  LDL ( bad cholesterol)  Is 157.....   Goal is 100 or less     I sent crestor 5 mg  less likely to cause muscle aches and lovaza to help lower the triglycerides  Decreasing saturated fats - increasing soluble fibers  - oats -pears- apples -beans etc - proteins in diet  and exercise/cardio will help decrease the bad cholesterol /total- ldl      Hga1c 6.4 no change from five months ago --increased risk of developing diabetes -decrease intake of simple carbs- white rice,bread,pasta ,sugar increasing complex carbs oats- grains- recheck in three months   CALLED PT AND ADVISED.KW

## 2024-10-16 NOTE — TELEPHONE ENCOUNTER
Patient was returning a call from yesterday regarding her lab results. Please give her a call back regarding labs.    Patient also called with questions about her pap smear.    Please give her a call back.

## 2024-10-17 NOTE — TELEPHONE ENCOUNTER
The medication was DENIED; DENIAL letter is uploaded to MEDIA.    General Denial Reasoning:    ___  Patient must try   medication before the insurance will cover this drug.  Unless there is a contraindication that you can provide.    ___  Drug is not covered for off label usage.  This drug is FDA approved for  .    ___  Drug is not covered by the plan ( Plan Exclusion)    _X__  Other; please see Denial Letter.    DIABETIC MEDICATION DENIALS:    ___ Must have a A1C greater the 7.0.    ___ Hypoglycemic episodes or 3 or more injections of insulin daily for Continuous Monitors.    ___   Drug is not covered by the plan ( Plan Exclusion)    ___  Other; please see Denial Letter.    WEIGHT LOSS MEDICATIONS DENIALS:    ___  Must have Tried and Failed Diet and Exercise.    ___  Insurance requesting Weight Loss Diary.    ___   Drug is not covered by the plan ( Plan Exclusion)    ___  Other; please see Denial Letter.    Note :        If you want an APPEAL; please note in this encounter what new information you would like to APPEAL with.  Once complete route back to PA POOL.    If this requires a response please respond to the pool ( P MHCX P[facility] MEDICATION PRE-AUTH).      Thank you please advise patient.

## 2024-10-21 RX ORDER — FENOFIBRATE 48 MG/1
48 TABLET, COATED ORAL DAILY
Qty: 90 TABLET | Refills: 1 | Status: SHIPPED | OUTPATIENT
Start: 2024-10-21 | End: 2025-01-19

## 2024-10-21 NOTE — TELEPHONE ENCOUNTER
Morning      I sent Tricor -and crestor to the pharmacy.. what day are you available for your pap      Gaby

## 2024-10-22 ENCOUNTER — TELEPHONE (OUTPATIENT)
Dept: FAMILY MEDICINE CLINIC | Age: 59
End: 2024-10-22

## 2024-10-22 ENCOUNTER — PATIENT MESSAGE (OUTPATIENT)
Dept: FAMILY MEDICINE CLINIC | Age: 59
End: 2024-10-22

## 2024-10-22 DIAGNOSIS — N64.4 PAIN OF BOTH BREASTS: Primary | ICD-10-CM

## 2024-10-22 NOTE — TELEPHONE ENCOUNTER
Netta from [facility] was calling to say they need an additional order placed for US of Left Breast.     When that is placed can we reach out to the patient to call and reschedule

## 2024-10-30 ENCOUNTER — PATIENT MESSAGE (OUTPATIENT)
Dept: FAMILY MEDICINE CLINIC | Age: 59
End: 2024-10-30

## 2024-11-02 DIAGNOSIS — E78.00 HYPERCHOLESTEROLEMIA: ICD-10-CM

## 2024-11-04 RX ORDER — ATORVASTATIN CALCIUM 20 MG/1
20 TABLET, FILM COATED ORAL DAILY
Qty: 90 TABLET | Refills: 0 | Status: SHIPPED | OUTPATIENT
Start: 2024-11-04

## 2024-11-05 RX ORDER — ESCITALOPRAM OXALATE 10 MG/1
10 TABLET ORAL DAILY
Qty: 90 TABLET | Refills: 0 | Status: SHIPPED | OUTPATIENT
Start: 2024-11-05 | End: 2024-11-08 | Stop reason: SDUPTHER

## 2024-11-08 ENCOUNTER — TELEPHONE (OUTPATIENT)
Dept: FAMILY MEDICINE CLINIC | Age: 59
End: 2024-11-08

## 2024-11-08 ENCOUNTER — HOSPITAL ENCOUNTER (OUTPATIENT)
Dept: ULTRASOUND IMAGING | Age: 59
Discharge: HOME | End: 2024-11-08
Payer: COMMERCIAL

## 2024-11-08 ENCOUNTER — HOSPITAL ENCOUNTER (OUTPATIENT)
Dept: WOMENS IMAGING | Age: 59
Discharge: HOME | End: 2024-11-08
Payer: COMMERCIAL

## 2024-11-08 VITALS — HEIGHT: 68 IN | WEIGHT: 177 LBS | BODY MASS INDEX: 26.83 KG/M2

## 2024-11-08 DIAGNOSIS — N64.4 PAIN OF BOTH BREASTS: ICD-10-CM

## 2024-11-08 PROCEDURE — 76642 ULTRASOUND BREAST LIMITED: CPT

## 2024-11-08 PROCEDURE — 77066 DX MAMMO INCL CAD BI: CPT

## 2024-11-08 PROCEDURE — G0279 TOMOSYNTHESIS, MAMMO: HCPCS

## 2024-11-08 PROCEDURE — 77063 BREAST TOMOSYNTHESIS BI: CPT

## 2024-11-08 RX ORDER — ESCITALOPRAM OXALATE 10 MG/1
10 TABLET ORAL DAILY
Qty: 90 TABLET | Refills: 0 | OUTPATIENT
Start: 2024-11-08 | End: 2025-02-06

## 2024-11-08 RX ORDER — ESCITALOPRAM OXALATE 10 MG/1
10 TABLET ORAL DAILY
Qty: 90 TABLET | Refills: 0 | Status: SHIPPED | OUTPATIENT
Start: 2024-11-08 | End: 2025-02-06

## 2024-11-12 DIAGNOSIS — M25.552 LEFT HIP PAIN: ICD-10-CM

## 2024-11-12 RX ORDER — CELECOXIB 100 MG/1
100 CAPSULE ORAL 2 TIMES DAILY
Qty: 60 CAPSULE | Refills: 1 | Status: SHIPPED | OUTPATIENT
Start: 2024-11-12

## 2024-12-31 ENCOUNTER — TELEPHONE (OUTPATIENT)
Dept: FAMILY MEDICINE CLINIC | Age: 59
End: 2024-12-31

## 2024-12-31 NOTE — TELEPHONE ENCOUNTER
----- Message from Felisha ESCALANTE sent at 12/30/2024  9:23 AM EST -----  Regarding: [facility] Appointment Request  [facility] Appointment Request    Patient needs appointment for [facility] Appointment Type: Annual Visit. Pap smear    Patient Requested Dates(s): anytime in January  Patient Requested Time: early in the morning  Provider Name: Gaby Linares APRN - CNP    Reason for Appointment Request: Established Patient - No appointments available during search  --------------------------------------------------------------------------------------------------------------------------    Relationship to Patient: Self     Call Back Information: OK to leave message on voicemail  Preferred Call Back Number:   [phone]

## 2025-01-18 ENCOUNTER — PATIENT MESSAGE (OUTPATIENT)
Dept: FAMILY MEDICINE CLINIC | Age: 60
End: 2025-01-18

## 2025-01-18 DIAGNOSIS — E78.2 MIXED HYPERLIPIDEMIA: ICD-10-CM

## 2025-01-20 DIAGNOSIS — M25.552 LEFT HIP PAIN: ICD-10-CM

## 2025-01-20 RX ORDER — FENOFIBRATE 48 MG/1
48 TABLET, COATED ORAL DAILY
Qty: 90 TABLET | Refills: 1 | Status: SHIPPED | OUTPATIENT
Start: 2025-01-20 | End: 2025-07-19

## 2025-01-20 RX ORDER — ROSUVASTATIN CALCIUM 5 MG/1
5 TABLET, COATED ORAL NIGHTLY
Qty: 90 TABLET | Refills: 1 | Status: SHIPPED | OUTPATIENT
Start: 2025-01-20 | End: 2025-07-19

## 2025-01-20 RX ORDER — CELECOXIB 100 MG/1
100 CAPSULE ORAL 2 TIMES DAILY
Qty: 180 CAPSULE | Refills: 1 | Status: SHIPPED | OUTPATIENT
Start: 2025-01-20

## 2025-02-03 DIAGNOSIS — E78.00 HYPERCHOLESTEROLEMIA: ICD-10-CM

## 2025-02-03 RX ORDER — ESCITALOPRAM OXALATE 10 MG/1
10 TABLET ORAL DAILY
Qty: 90 TABLET | Refills: 1 | Status: SHIPPED | OUTPATIENT
Start: 2025-02-03

## 2025-02-03 RX ORDER — ATORVASTATIN CALCIUM 20 MG/1
20 TABLET, FILM COATED ORAL DAILY
Qty: 90 TABLET | Refills: 1 | Status: SHIPPED | OUTPATIENT
Start: 2025-02-03

## 2025-02-28 ENCOUNTER — PATIENT MESSAGE (OUTPATIENT)
Dept: FAMILY MEDICINE CLINIC | Age: 60
End: 2025-02-28

## 2025-02-28 DIAGNOSIS — R89.4 POSITIVE TEST FOR HERPES SIMPLEX VIRUS ANTIBODY: Primary | ICD-10-CM

## 2025-02-28 RX ORDER — ACYCLOVIR 200 MG/1
200 CAPSULE ORAL 3 TIMES DAILY
Qty: 90 CAPSULE | Refills: 1 | Status: SHIPPED | OUTPATIENT
Start: 2025-02-28

## 2025-03-24 DIAGNOSIS — R89.4 POSITIVE TEST FOR HERPES SIMPLEX VIRUS ANTIBODY: ICD-10-CM

## 2025-03-24 RX ORDER — ACYCLOVIR 200 MG/1
200 CAPSULE ORAL 3 TIMES DAILY
Qty: 270 CAPSULE | Refills: 1 | Status: SHIPPED | OUTPATIENT
Start: 2025-03-24

## 2025-03-25 ENCOUNTER — PATIENT MESSAGE (OUTPATIENT)
Dept: FAMILY MEDICINE CLINIC | Age: 60
End: 2025-03-25

## 2025-04-21 DIAGNOSIS — E78.2 MIXED HYPERLIPIDEMIA: ICD-10-CM

## 2025-04-21 DIAGNOSIS — R89.4 POSITIVE TEST FOR HERPES SIMPLEX VIRUS ANTIBODY: ICD-10-CM

## 2025-04-22 RX ORDER — ROSUVASTATIN CALCIUM 5 MG/1
5 TABLET, COATED ORAL NIGHTLY
Qty: 90 TABLET | Refills: 1 | Status: SHIPPED | OUTPATIENT
Start: 2025-04-22 | End: 2025-10-19

## 2025-04-22 RX ORDER — ACYCLOVIR 200 MG/1
200 CAPSULE ORAL 3 TIMES DAILY
Qty: 270 CAPSULE | Refills: 1 | OUTPATIENT
Start: 2025-04-22

## 2025-05-05 RX ORDER — FENOFIBRATE 48 MG/1
48 TABLET, FILM COATED ORAL DAILY
Qty: 90 TABLET | Refills: 1 | OUTPATIENT
Start: 2025-05-05

## 2025-06-07 ENCOUNTER — PATIENT MESSAGE (OUTPATIENT)
Dept: FAMILY MEDICINE CLINIC | Age: 60
End: 2025-06-07

## 2025-06-07 DIAGNOSIS — R89.4 POSITIVE TEST FOR HERPES SIMPLEX VIRUS ANTIBODY: ICD-10-CM

## 2025-06-07 DIAGNOSIS — M25.552 LEFT HIP PAIN: ICD-10-CM

## 2025-06-07 DIAGNOSIS — E78.00 HYPERCHOLESTEROLEMIA: ICD-10-CM

## 2025-06-07 DIAGNOSIS — E78.2 MIXED HYPERLIPIDEMIA: ICD-10-CM

## 2025-06-09 RX ORDER — ATORVASTATIN CALCIUM 20 MG/1
20 TABLET, FILM COATED ORAL DAILY
Qty: 90 TABLET | Refills: 1 | OUTPATIENT
Start: 2025-06-09

## 2025-06-09 RX ORDER — ESCITALOPRAM OXALATE 10 MG/1
10 TABLET ORAL DAILY
Qty: 90 TABLET | Refills: 1 | OUTPATIENT
Start: 2025-06-09

## 2025-06-09 RX ORDER — CELECOXIB 100 MG/1
100 CAPSULE ORAL 2 TIMES DAILY
Qty: 180 CAPSULE | Refills: 1 | OUTPATIENT
Start: 2025-06-09

## 2025-06-09 RX ORDER — FENOFIBRATE 48 MG/1
48 TABLET, FILM COATED ORAL DAILY
Qty: 90 TABLET | Refills: 1 | OUTPATIENT
Start: 2025-06-09

## 2025-06-09 RX ORDER — ACYCLOVIR 200 MG/1
200 CAPSULE ORAL 3 TIMES DAILY
Qty: 270 CAPSULE | Refills: 1 | OUTPATIENT
Start: 2025-06-09

## 2025-06-09 RX ORDER — ROSUVASTATIN CALCIUM 5 MG/1
5 TABLET, COATED ORAL NIGHTLY
Qty: 90 TABLET | Refills: 1 | OUTPATIENT
Start: 2025-06-09 | End: 2025-12-06

## 2025-06-09 RX ORDER — FENOFIBRATE 48 MG/1
48 TABLET, FILM COATED ORAL DAILY
Qty: 90 TABLET | Refills: 1 | OUTPATIENT
Start: 2025-06-09 | End: 2025-12-06

## 2025-06-09 NOTE — TELEPHONE ENCOUNTER
PT WILL BE USING [facility] STARTING WITH HER NEXT REFILLS. SHE IS CURRENTLY FINISHING WITH THE LAST ONES THAT [facility] HAS ON FILE.

## 2025-06-09 NOTE — TELEPHONE ENCOUNTER
PT WILL BE USING [facility] STARTING WITH HER NEXT REFILLS.  SHE IS CURRENTLY FINISHING WITH THE LAST ONES THAT [facility] HAS ON FILE.

## 2025-06-18 ENCOUNTER — OFFICE VISIT (OUTPATIENT)
Dept: FAMILY MEDICINE CLINIC | Age: 60
End: 2025-06-18

## 2025-06-18 DIAGNOSIS — M25.532 LEFT WRIST PAIN: Primary | ICD-10-CM

## 2025-06-23 ENCOUNTER — ANESTHESIA (OUTPATIENT)
Dept: ENDOSCOPY | Age: 60
End: 2025-06-23
Payer: COMMERCIAL

## 2025-06-23 ENCOUNTER — HOSPITAL ENCOUNTER (OUTPATIENT)
Age: 60
Setting detail: OUTPATIENT SURGERY
Discharge: HOME | End: 2025-06-23
Attending: INTERNAL MEDICINE | Admitting: INTERNAL MEDICINE
Payer: COMMERCIAL

## 2025-06-23 ENCOUNTER — ANESTHESIA EVENT (OUTPATIENT)
Dept: ENDOSCOPY | Age: 60
End: 2025-06-23
Payer: COMMERCIAL

## 2025-06-23 VITALS
HEIGHT: 68 IN | TEMPERATURE: 97.1 F | BODY MASS INDEX: 26.07 KG/M2 | OXYGEN SATURATION: 100 % | RESPIRATION RATE: 16 BRPM | DIASTOLIC BLOOD PRESSURE: 72 MMHG | WEIGHT: 172 LBS | HEART RATE: 51 BPM | SYSTOLIC BLOOD PRESSURE: 118 MMHG

## 2025-06-23 DIAGNOSIS — Z12.11 COLON CANCER SCREENING: ICD-10-CM

## 2025-06-23 PROCEDURE — 2709999900 HC NON-CHARGEABLE SUPPLY: Performed by: INTERNAL MEDICINE

## 2025-06-23 PROCEDURE — 88305 TISSUE EXAM BY PATHOLOGIST: CPT

## 2025-06-23 PROCEDURE — 3700000000 HC ANESTHESIA ATTENDED CARE: Performed by: INTERNAL MEDICINE

## 2025-06-23 PROCEDURE — 2580000003 HC RX 258: Performed by: ANESTHESIOLOGY

## 2025-06-23 PROCEDURE — 6360000002 HC RX W HCPCS: Performed by: NURSE ANESTHETIST, CERTIFIED REGISTERED

## 2025-06-23 PROCEDURE — 3700000001 HC ADD 15 MINUTES (ANESTHESIA): Performed by: INTERNAL MEDICINE

## 2025-06-23 PROCEDURE — 3609010600 HC COLONOSCOPY POLYPECTOMY SNARE/COLD BIOPSY: Performed by: INTERNAL MEDICINE

## 2025-06-23 PROCEDURE — 7100000010 HC PHASE II RECOVERY - FIRST 15 MIN: Performed by: INTERNAL MEDICINE

## 2025-06-23 PROCEDURE — 7100000011 HC PHASE II RECOVERY - ADDTL 15 MIN: Performed by: INTERNAL MEDICINE

## 2025-06-23 RX ORDER — PROPOFOL 10 MG/ML
INJECTION, EMULSION INTRAVENOUS
Status: DISCONTINUED | OUTPATIENT
Start: 2025-06-23 | End: 2025-06-23 | Stop reason: SDUPTHER

## 2025-06-23 RX ORDER — LIDOCAINE HYDROCHLORIDE 20 MG/ML
INJECTION, SOLUTION INFILTRATION; PERINEURAL
Status: DISCONTINUED | OUTPATIENT
Start: 2025-06-23 | End: 2025-06-23 | Stop reason: SDUPTHER

## 2025-06-23 RX ORDER — M-VIT,TX,IRON,MINS/CALC/FOLIC 27MG-0.4MG
1 TABLET ORAL DAILY
COMMUNITY

## 2025-06-23 RX ORDER — SODIUM CHLORIDE 9 MG/ML
INJECTION, SOLUTION INTRAVENOUS CONTINUOUS
Status: DISCONTINUED | OUTPATIENT
Start: 2025-06-23 | End: 2025-06-23 | Stop reason: HOSPADM

## 2025-06-23 RX ORDER — MULTIVIT-MINERALS/FOLIC ACID 120 MCG
TABLET,CHEWABLE ORAL DAILY
COMMUNITY

## 2025-06-23 RX ADMIN — LIDOCAINE HYDROCHLORIDE 30 MG: 20 INJECTION, SOLUTION INFILTRATION; PERINEURAL at 08:21

## 2025-06-23 RX ADMIN — PROPOFOL 150 MCG/KG/MIN: 10 INJECTION, EMULSION INTRAVENOUS at 08:22

## 2025-06-23 RX ADMIN — PROPOFOL 50 MG: 10 INJECTION, EMULSION INTRAVENOUS at 08:21

## 2025-06-23 RX ADMIN — SODIUM CHLORIDE: 0.9 INJECTION, SOLUTION INTRAVENOUS at 07:59

## 2025-06-23 ASSESSMENT — ENCOUNTER SYMPTOMS: SHORTNESS OF BREATH: 0

## 2025-06-23 ASSESSMENT — PAIN - FUNCTIONAL ASSESSMENT
PAIN_FUNCTIONAL_ASSESSMENT: NONE - DENIES PAIN

## 2025-06-23 NOTE — H&P
Gastroenterology Note                 Pre-operative History and Physical    Patient: Laura Joshi  : 1965  CSN:     History Obtained From:   Patient or guardian.      HISTORY OF PRESENT ILLNESS:    The patient is a 60 y.o. female here for Endoscopy.      Past Medical History:    Past Medical History:   Diagnosis Date    Anemia     iron defieciency    Asthma     Brain tumor (HCC)     surgery for brain tumor 3/15/18    Depression     Hemorrhagic cyst of ovary 2007    Left    Hyperlipidemia     Tubular adenoma of colon 2018    COLONOSCOPY, DR CLAUDINE KHAN, TUBULAR ADENOMAS ASCENDING COLON, 3 MM, 4 MM, 5 MM REMOVED.     Past Surgical History:    Past Surgical History:   Procedure Laterality Date    BRAIN SURGERY  2018    Non cancerous tumor    BREAST ENHANCEMENT SURGERY      COLONOSCOPY  2018    COLONOSCOPY, DR CLAUDINE KHAN, 3 MM, 4 MM, 5 MM ASCENDING COLON POLYP REMOVED. LIMITED PREP.  REPEAT 3-6 MONTHS.    COSMETIC SURGERY      Breast implants and lift    EPIGASTRIC HERNIA REPAIR  10/30/2017    with mesh     HERNIA REPAIR      JOINT REPLACEMENT  May 2021    Left hip    OTHER SURGICAL HISTORY Left     lump removed from under left arm pit in her 30's    PITUITARY SURGERY N/A     brain surgery    TOTAL HIP ARTHROPLASTY Left 2021    LEFT ANTERIOR TOTAL HIP REPLACEMENT WITH C-ARM performed by Alli Malloy MD at [facility] OR    TUBAL LIGATION Bilateral     UMBILICAL HERNIA REPAIR  2016    and umbilectomy     Medications Prior to Admission:   No current facility-administered medications on file prior to encounter.     Current Outpatient Medications on File Prior to Encounter   Medication Sig Dispense Refill    Sodium Hyaluronate, oral, (HYALURONIC ACID PO) Take by mouth Daily      MAGNESIUM-POTASSIUM PO Take by mouth daily      Multiple Vitamins-Minerals (THERAPEUTIC MULTIVITAMIN-MINERALS) tablet Take 1 tablet by mouth daily      TURMERIC-JET PO Take

## 2025-06-23 NOTE — ANESTHESIA POSTPROCEDURE EVALUATION
Department of Anesthesiology  Postprocedure Note    Patient: Laura Joshi  MRN: [id number]  YOB: 1965  Date of evaluation: 6/23/2025    Procedure Summary       Date: 06/23/25 Room / Location: [place] [address] / [facility]    Anesthesia Start: 0818 Anesthesia Stop: 0839    Procedure: COLONOSCOPY POLYPECTOMY SNARE/BIOPSY Diagnosis:       Colon cancer screening      (Colon cancer screening [Z12.11])    Surgeons: Freddy Oliveros MD Responsible Provider: Neal Vance MD    Anesthesia Type: MAC ASA Status: 3            Anesthesia Type: No value filed.    Sridhar Phase I: Sridhar Score: 10    Sridhar Phase II: Sridhar Score: 9    Anesthesia Post Evaluation    Patient location during evaluation: PACU  Patient participation: complete - patient participated  Level of consciousness: awake  Airway patency: patent  Nausea & Vomiting: no nausea and no vomiting  Cardiovascular status: hemodynamically stable  Respiratory status: acceptable  Hydration status: stable  Multimodal analgesia pain management approach  Pain management: adequate    No notable events documented.

## 2025-06-23 NOTE — DISCHARGE INSTRUCTIONS
COLONSCOPY DISCHARGE INSTRUCTIONS    You may experience some lightheadedness for the next several hours.  Plan on quiet relaxation for the rest of today. Nap for four hours following procedure if possible.  A responsible adult needs to stay with you today.    Eat bland food and avoid anything greasy or spicy initially-progress to your normal diet gradually.  Diet restrictions as instructed.  You may resume home medications as instructed.    It is not unusual to experience some mild cramping or gas pains, and you may not have a bowel movement for several days.  If you had a polyp removed, avoid strenuous activity for 48 hours.  Avoid the use of aspirin or related compounds for one week, unless otherwise instructed by your physician.    You may notice a small amount of blood in your next few bowel movements, but if a large amount passes, call your physician.    If you have any of the following problems, notify your physician or return to the hospital emergency room : fever, chills, excessive bleeding, excessive vomiting, difficulty swallowing, uncontrolled pain, increased abdominal distention, shortness of breath or any other problems.    Call your doctor at [phone] if you have any concerns.     If you had any biopsies or polyps call for results in 14 business days.    See your physician's report for details about your procedure and recommendations.      ANESTHESIA DISCHARGE INSTRUCTIONS    Wear your seatbelt home.    You are under the influence of drugs-do not drink alcohol, drive ,operate machinery,or make any important decisions or sign any legal documentsfor 24 hours. You may resume normal activities tomorrow.  A responsible adult needs to be with you for 24 hours.  You may experience lightheadedness,dizziness,or sleepiness following surgery.    Rest at home today- increase activity as tolerated. It is recommended to take a four hour nap after procedure.  Progress slowly to a regular diet unless your

## 2025-06-23 NOTE — ANESTHESIA PRE PROCEDURE
Department of Anesthesiology  Preprocedure Note       Name:  Laura Joshi   Age:  60 y.o.  :  1965                                          MRN:  [id number]         Date:  2025      Surgeon: Surgeon(s):  Freddy Oliveros MD    Procedure: Procedure(s):  COLONOSCOPY DIAGNOSTIC    Medications prior to admission:   Prior to Admission medications    Medication Sig Start Date End Date Taking? Authorizing Provider   rosuvastatin (CRESTOR) 5 MG tablet Take 1 tablet by mouth nightly 4/22/25 10/19/25  Glischinski, Luke A, APRN - CNP   acyclovir (ZOVIRAX) 200 MG capsule TAKE 1 CAPSULE BY MOUTH THREE TIMES A DAY 3/24/25   Gaby Linares APRN - CNP   escitalopram (LEXAPRO) 10 MG tablet TAKE 1 TABLET BY MOUTH EVERY DAY 2/3/25   Beck Dennison MD   fenofibrate (TRICOR) 48 MG tablet Take 1 tablet by mouth daily 25  Gaby Linares APRN - CNP   FIBER PO Take by mouth    Edgar Plascencia MD   cyanocobalamin 1000 MCG tablet Take by mouth    Edgar Plascencia MD   ascorbic acid (VITAMIN C) 250 MG tablet Take by mouth    Edgar Plascencia MD   calcium carbonate (OSCAL) 500 MG TABS tablet Take 1 tablet by mouth daily    Edgar Plascencia MD   denosumab (PROLIA) 60 MG/ML SOSY SC injection Inject 1 mL into the skin once    Edgar Plascencia MD   Cholecalciferol (VITAMIN D) 50 MCG (2000 UT) CAPS capsule Take 1 capsule by mouth daily    Edgar Plascencia MD   albuterol sulfate HFA (PROAIR HFA) 108 (90 Base) MCG/ACT inhaler Inhale 2 puffs into the lungs every 4 hours as needed for Wheezing  Patient not taking: Reported on 2025   Tamra Styles APRN - CNP       Current medications:    Current Facility-Administered Medications   Medication Dose Route Frequency Provider Last Rate Last Admin    0.9 % sodium chloride infusion   IntraVENous Continuous Neal Vance MD           Allergies:    Allergies   Allergen Reactions    Fosamax [Alendronate] Other

## 2025-06-23 NOTE — PROGRESS NOTES
Discharge instructions reviewed with patient/responsible adult. All home medications have been reviewed, questions answered and patient verbalized understanding.  Discharge instructions signed and copies given. Patient discharged ambulatory to home with belongings.   Post-procedure education reviewed with patient and visitor, including resuming of medication regimen, and they verbalized understanding.  
Preop instructions sent via [employer]. Patient informed.    Instructions given:    Verbal __X__    Voicemail ____   
Teaching / education initiated regarding perioperative experience, expectations, and pain management during stay. Patient verbalized understanding.    
To endo recovery from procedure room. VSS, awakens to voice, respirations easy and unlabored.   
have a responsible adult to stay with you during the procedure,drive you home and stay with you.      -Please call [facility] at [phone] with any questions or concerns.      OTHER INSTRUCTIONS:          VISITOR POLICY (subject to change)    Current visitor policy is 2 visitors per patient. No children allowed under the age of 14. Mask at discretion of facility. Visiting hours are 8a-8p. Overnight visitors will be at the discretion of the nurse. All policies are subject to change.

## 2025-07-25 DIAGNOSIS — R89.4 POSITIVE TEST FOR HERPES SIMPLEX VIRUS ANTIBODY: ICD-10-CM

## 2025-07-25 DIAGNOSIS — E78.2 MIXED HYPERLIPIDEMIA: ICD-10-CM

## 2025-07-28 DIAGNOSIS — R89.4 POSITIVE TEST FOR HERPES SIMPLEX VIRUS ANTIBODY: ICD-10-CM

## 2025-07-28 DIAGNOSIS — E78.2 MIXED HYPERLIPIDEMIA: ICD-10-CM

## 2025-07-28 RX ORDER — ACYCLOVIR 200 MG/1
200 CAPSULE ORAL 3 TIMES DAILY
Qty: 270 CAPSULE | Refills: 0 | OUTPATIENT
Start: 2025-07-28

## 2025-07-28 RX ORDER — FENOFIBRATE 48 MG/1
48 TABLET, FILM COATED ORAL DAILY
Qty: 90 TABLET | Refills: 0 | Status: SHIPPED | OUTPATIENT
Start: 2025-07-28 | End: 2025-08-03

## 2025-07-28 RX ORDER — ROSUVASTATIN CALCIUM 5 MG/1
5 TABLET, COATED ORAL NIGHTLY
Qty: 90 TABLET | Refills: 0 | Status: SHIPPED | OUTPATIENT
Start: 2025-07-28 | End: 2025-08-03

## 2025-07-28 RX ORDER — ACYCLOVIR 200 MG/1
200 CAPSULE ORAL 3 TIMES DAILY
Qty: 90 CAPSULE | OUTPATIENT
Start: 2025-07-28

## 2025-07-28 RX ORDER — ESCITALOPRAM OXALATE 10 MG/1
10 TABLET ORAL DAILY
Qty: 90 TABLET | Refills: 0 | OUTPATIENT
Start: 2025-07-28

## 2025-07-28 RX ORDER — ACYCLOVIR 200 MG/1
200 CAPSULE ORAL 3 TIMES DAILY
Qty: 270 CAPSULE | Refills: 0 | Status: SHIPPED | OUTPATIENT
Start: 2025-07-28

## 2025-07-28 RX ORDER — FENOFIBRATE 48 MG/1
48 TABLET, FILM COATED ORAL DAILY
Qty: 90 TABLET | Refills: 0 | OUTPATIENT
Start: 2025-07-28 | End: 2025-10-26

## 2025-07-28 RX ORDER — ESCITALOPRAM OXALATE 10 MG/1
10 TABLET ORAL DAILY
Qty: 90 TABLET | Refills: 0 | Status: SHIPPED | OUTPATIENT
Start: 2025-07-28 | End: 2025-08-03

## 2025-07-28 RX ORDER — ROSUVASTATIN CALCIUM 5 MG/1
5 TABLET, COATED ORAL NIGHTLY
Qty: 90 TABLET | Refills: 0 | OUTPATIENT
Start: 2025-07-28 | End: 2025-10-26

## 2025-08-02 DIAGNOSIS — E78.2 MIXED HYPERLIPIDEMIA: ICD-10-CM

## 2025-08-03 RX ORDER — ESCITALOPRAM OXALATE 10 MG/1
10 TABLET ORAL DAILY
Qty: 90 TABLET | Refills: 0 | Status: SHIPPED | OUTPATIENT
Start: 2025-08-03

## 2025-08-03 RX ORDER — FENOFIBRATE 48 MG/1
48 TABLET, FILM COATED ORAL DAILY
Qty: 90 TABLET | Refills: 0 | Status: SHIPPED | OUTPATIENT
Start: 2025-08-03 | End: 2025-11-01

## 2025-08-03 RX ORDER — ROSUVASTATIN CALCIUM 5 MG/1
5 TABLET, COATED ORAL NIGHTLY
Qty: 90 TABLET | Refills: 0 | Status: SHIPPED | OUTPATIENT
Start: 2025-08-03

## (undated) DEVICE — GOWN SIRUS NONREIN XL W/TWL: Brand: MEDLINE INDUSTRIES, INC.

## (undated) DEVICE — BASIC SINGLE BASIN 1-LF: Brand: MEDLINE INDUSTRIES, INC.

## (undated) DEVICE — ELECTRODE PT RET AD L9FT HI MOIST COND ADH HYDRGEL CORDED

## (undated) DEVICE — GLOVE ORTHO 8   MSG9480

## (undated) DEVICE — RETRACTOR SURG WIDE FLAT LO PROF LTWT PHOTONGUIDE

## (undated) DEVICE — PAD DRY FLOOR ABS 32X58IN GRN

## (undated) DEVICE — SOLUTION IRRIG 500ML STRL H2O NONPYROGENIC

## (undated) DEVICE — TRAP SPEC RETRV CLR PLAS POLYP IN LN SUCT QUIK CTCH

## (undated) DEVICE — SUTURE ETHBND EXCEL SZ 2 L30IN NONABSORBABLE GRN L40MM V-37 MX69G

## (undated) DEVICE — Z DISCONTINUED USE 2716304 SUTURE STRATAFIX SPRL SZ 3-0 L12IN ABSRB UD FS-1 L24MM 3/8

## (undated) DEVICE — 1010 S-DRAPE TOWEL DRAPE 10/BX: Brand: STERI-DRAPE™

## (undated) DEVICE — SYRINGE MED 30ML STD CLR PLAS LUERLOCK TIP N CTRL DISP

## (undated) DEVICE — APPLICATOR MEDICATED 26 CC SOLUTION HI LT ORNG CHLORAPREP

## (undated) DEVICE — DRAPE C ARM UNIV W41XL74IN CLR PLAS XR VELC CLSR POLY STRP

## (undated) DEVICE — AIR/WATER CLEANING ADAPTER FOR OLYMPUS® GI ENDOSCOPE: Brand: BULLDOG®

## (undated) DEVICE — TOTAL BASIC: Brand: MEDLINE INDUSTRIES, INC.

## (undated) DEVICE — SNARE COLD DIAMOND 15MM THIN

## (undated) DEVICE — SYRINGE MED 3ML CLR PLAS STD N CTRL LUERLOCK TIP DISP

## (undated) DEVICE — BW-412T DISP COMBO CLEANING BRUSH: Brand: SINGLE USE COMBINATION CLEANING BRUSH

## (undated) DEVICE — TAPE ADH W4INXL5YD WHT COT E BNDG RUB BASE CURAD

## (undated) DEVICE — SYRINGE IRRIG 60ML SFT PLIABLE BLB EZ TO GRP 1 HND USE W/

## (undated) DEVICE — SOLUTION IV IRRIG POUR BRL 0.9% SODIUM CHL 2F7124

## (undated) DEVICE — TUBING IRRIG COMPATIBLE W ERBE MEDIVATOR PMP HYDR

## (undated) DEVICE — COVER,TABLE,77X90,STERILE: Brand: MEDLINE

## (undated) DEVICE — CAP WATER BTL AIR TBNG L 16 IN CO2 TBNG L 48 IN ENDOSCP

## (undated) DEVICE — DUAL CUT SAGITTAL BLADE

## (undated) DEVICE — HYPODERMIC SAFETY NEEDLE: Brand: MAGELLAN

## (undated) DEVICE — 6619 2 PTNT ISO SYS INCISE AREA&LT;(&GT;&&LT;)&GT;P: Brand: STERI-DRAPE™ IOBAN™ 2

## (undated) DEVICE — SUTURE ABSORBABLE MONOFILAMENT 1-0 OS8 14 IN STRATAFIX SPRL SXPD2B202

## (undated) DEVICE — DECANTER FLD 9IN ST BG FOR ASEP TRNSF OF FLD

## (undated) DEVICE — SOLUTION PREP POVIDONE IOD FOR SKIN MUCOUS MEM PRIOR TO

## (undated) DEVICE — SINGLE USE AIR/WATER, SUCTION AND BIOPSY VALVES SET: Brand: ORCAPOD™

## (undated) DEVICE — ELECTRODE BLDE L4IN NONINSULATED EDGE

## (undated) DEVICE — SHEET,DRAPE,53X77,STERILE: Brand: MEDLINE

## (undated) DEVICE — BIPOLAR SEALER 23-112-1 AQM 6.0: Brand: AQUAMANTYS ®

## (undated) DEVICE — ENDOSCOPIC KIT 6X3/16 FT COLON W/ 1.1 OZ 2 GWN W/O BRSH

## (undated) DEVICE — SUTURE STRATAFIX SPRL SZ 2 0 L14IN ABSRB UD MH L36MM 1 2 CIR SXMD2B401

## (undated) DEVICE — KIT POS FOAM HANA TBL

## (undated) DEVICE — SOLUTION IV 1000ML 0.9% SOD CHL FOR IRRIG PLAS CONT

## (undated) DEVICE — GLOVE SURG SZ 8 L12IN FNGR THK79MIL GRN LTX FREE

## (undated) DEVICE — NEEDLE HYPO 18GA L1.5IN THN WALL PIVOTING SHLD BVL ORIENTED